# Patient Record
Sex: MALE | Race: WHITE | URBAN - METROPOLITAN AREA
[De-identification: names, ages, dates, MRNs, and addresses within clinical notes are randomized per-mention and may not be internally consistent; named-entity substitution may affect disease eponyms.]

---

## 2018-05-01 ENCOUNTER — HOSPITAL ENCOUNTER (INPATIENT)
Facility: CLINIC | Age: 83
LOS: 12 days | Discharge: NURSING, REHAB OR LONG-TERM CARE | DRG: 682 | End: 2018-05-15
Attending: EMERGENCY MEDICINE | Admitting: INTERNAL MEDICINE
Payer: MEDICARE

## 2018-05-01 ENCOUNTER — APPOINTMENT (OUTPATIENT)
Dept: GENERAL RADIOLOGY | Facility: CLINIC | Age: 83
DRG: 682 | End: 2018-05-01
Attending: EMERGENCY MEDICINE
Payer: MEDICARE

## 2018-05-01 ENCOUNTER — APPOINTMENT (OUTPATIENT)
Dept: CT IMAGING | Facility: CLINIC | Age: 83
DRG: 682 | End: 2018-05-01
Attending: EMERGENCY MEDICINE
Payer: MEDICARE

## 2018-05-01 DIAGNOSIS — M62.81 GENERALIZED MUSCLE WEAKNESS: ICD-10-CM

## 2018-05-01 DIAGNOSIS — M25.50 MULTIPLE JOINT PAIN: ICD-10-CM

## 2018-05-01 DIAGNOSIS — R41.82 ALTERED MENTAL STATUS, UNSPECIFIED ALTERED MENTAL STATUS TYPE: ICD-10-CM

## 2018-05-01 DIAGNOSIS — R33.8 BENIGN PROSTATIC HYPERPLASIA WITH URINARY RETENTION: ICD-10-CM

## 2018-05-01 DIAGNOSIS — N17.9 ACUTE RENAL FAILURE, UNSPECIFIED ACUTE RENAL FAILURE TYPE (H): Primary | ICD-10-CM

## 2018-05-01 DIAGNOSIS — I10 BENIGN ESSENTIAL HYPERTENSION: ICD-10-CM

## 2018-05-01 DIAGNOSIS — E86.0 DEHYDRATION: ICD-10-CM
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
## 2018-05-01 DIAGNOSIS — F03.91 DEMENTIA WITH BEHAVIORAL DISTURBANCE, UNSPECIFIED DEMENTIA TYPE: ICD-10-CM

## 2018-05-01 DIAGNOSIS — K27.9 PUD (PEPTIC ULCER DISEASE): ICD-10-CM

## 2018-05-01 DIAGNOSIS — N40.1 BENIGN PROSTATIC HYPERPLASIA WITH URINARY RETENTION: ICD-10-CM

## 2018-05-01 LAB
ALBUMIN SERPL-MCNC: 2.6 G/DL (ref 3.4–5)
ALP SERPL-CCNC: 74 U/L (ref 40–150)
ALT SERPL W P-5'-P-CCNC: 11 U/L (ref 0–70)
ANION GAP SERPL CALCULATED.3IONS-SCNC: 9 MMOL/L (ref 3–14)
AST SERPL W P-5'-P-CCNC: 14 U/L (ref 0–45)
BASOPHILS # BLD AUTO: 0 10E9/L (ref 0–0.2)
BASOPHILS NFR BLD AUTO: 0.5 %
BILIRUB SERPL-MCNC: 0.3 MG/DL (ref 0.2–1.3)
BUN SERPL-MCNC: 48 MG/DL (ref 7–30)
CALCIUM SERPL-MCNC: 8 MG/DL (ref 8.5–10.1)
CHLORIDE SERPL-SCNC: 111 MMOL/L (ref 94–109)
CO2 SERPL-SCNC: 23 MMOL/L (ref 20–32)
CREAT SERPL-MCNC: 3.38 MG/DL (ref 0.66–1.25)
DIFFERENTIAL METHOD BLD: ABNORMAL
EOSINOPHIL # BLD AUTO: 0.2 10E9/L (ref 0–0.7)
EOSINOPHIL NFR BLD AUTO: 2.7 %
ERYTHROCYTE [DISTWIDTH] IN BLOOD BY AUTOMATED COUNT: 14.9 % (ref 10–15)
GFR SERPL CREATININE-BSD FRML MDRD: 17 ML/MIN/1.7M2
GLUCOSE SERPL-MCNC: 103 MG/DL (ref 70–99)
HCT VFR BLD AUTO: 30 % (ref 40–53)
HGB BLD-MCNC: 10.1 G/DL (ref 13.3–17.7)
IMM GRANULOCYTES # BLD: 0 10E9/L (ref 0–0.4)
IMM GRANULOCYTES NFR BLD: 0.2 %
INR PPP: 1.15 (ref 0.86–1.14)
LYMPHOCYTES # BLD AUTO: 1 10E9/L (ref 0.8–5.3)
LYMPHOCYTES NFR BLD AUTO: 11.8 %
MCH RBC QN AUTO: 29.6 PG (ref 26.5–33)
MCHC RBC AUTO-ENTMCNC: 33.7 G/DL (ref 31.5–36.5)
MCV RBC AUTO: 88 FL (ref 78–100)
MONOCYTES # BLD AUTO: 0.5 10E9/L (ref 0–1.3)
MONOCYTES NFR BLD AUTO: 5.5 %
NEUTROPHILS # BLD AUTO: 6.7 10E9/L (ref 1.6–8.3)
NEUTROPHILS NFR BLD AUTO: 79.3 %
NRBC # BLD AUTO: 0 10*3/UL
NRBC BLD AUTO-RTO: 0 /100
PLATELET # BLD AUTO: 138 10E9/L (ref 150–450)
POTASSIUM SERPL-SCNC: 4.5 MMOL/L (ref 3.4–5.3)
PROT SERPL-MCNC: 5.7 G/DL (ref 6.8–8.8)
RBC # BLD AUTO: 3.41 10E12/L (ref 4.4–5.9)
SODIUM SERPL-SCNC: 143 MMOL/L (ref 133–144)
TROPONIN I SERPL-MCNC: 0.02 UG/L (ref 0–0.04)
WBC # BLD AUTO: 8.5 10E9/L (ref 4–11)

## 2018-05-01 PROCEDURE — 80053 COMPREHEN METABOLIC PANEL: CPT | Performed by: EMERGENCY MEDICINE

## 2018-05-01 PROCEDURE — 99285 EMERGENCY DEPT VISIT HI MDM: CPT | Mod: 25

## 2018-05-01 PROCEDURE — 84484 ASSAY OF TROPONIN QUANT: CPT | Performed by: EMERGENCY MEDICINE

## 2018-05-01 PROCEDURE — 85610 PROTHROMBIN TIME: CPT | Performed by: EMERGENCY MEDICINE

## 2018-05-01 PROCEDURE — 99220 ZZC INITIAL OBSERVATION CARE,LEVL III: CPT | Performed by: INTERNAL MEDICINE

## 2018-05-01 PROCEDURE — 93005 ELECTROCARDIOGRAM TRACING: CPT

## 2018-05-01 PROCEDURE — 96361 HYDRATE IV INFUSION ADD-ON: CPT

## 2018-05-01 PROCEDURE — G0378 HOSPITAL OBSERVATION PER HR: HCPCS

## 2018-05-01 PROCEDURE — 70450 CT HEAD/BRAIN W/O DYE: CPT

## 2018-05-01 PROCEDURE — 96360 HYDRATION IV INFUSION INIT: CPT

## 2018-05-01 PROCEDURE — 25000132 ZZH RX MED GY IP 250 OP 250 PS 637: Mod: GY | Performed by: INTERNAL MEDICINE

## 2018-05-01 PROCEDURE — A9270 NON-COVERED ITEM OR SERVICE: HCPCS | Mod: GY | Performed by: INTERNAL MEDICINE

## 2018-05-01 PROCEDURE — 73080 X-RAY EXAM OF ELBOW: CPT | Mod: RT

## 2018-05-01 PROCEDURE — 80177 DRUG SCRN QUAN LEVETIRACETAM: CPT | Performed by: EMERGENCY MEDICINE

## 2018-05-01 PROCEDURE — 85025 COMPLETE CBC W/AUTO DIFF WBC: CPT | Performed by: EMERGENCY MEDICINE

## 2018-05-01 PROCEDURE — 25000128 H RX IP 250 OP 636: Performed by: EMERGENCY MEDICINE

## 2018-05-01 PROCEDURE — 25000128 H RX IP 250 OP 636: Performed by: INTERNAL MEDICINE

## 2018-05-01 RX ORDER — ACETAMINOPHEN 325 MG/1
650 TABLET ORAL EVERY 4 HOURS PRN
Status: DISCONTINUED | OUTPATIENT
Start: 2018-05-01 | End: 2018-05-15 | Stop reason: HOSPADM

## 2018-05-01 RX ORDER — ONDANSETRON 4 MG/1
4 TABLET, ORALLY DISINTEGRATING ORAL EVERY 6 HOURS PRN
Status: DISCONTINUED | OUTPATIENT
Start: 2018-05-01 | End: 2018-05-15 | Stop reason: HOSPADM

## 2018-05-01 RX ORDER — HYDRALAZINE HYDROCHLORIDE 20 MG/ML
10 INJECTION INTRAMUSCULAR; INTRAVENOUS EVERY 6 HOURS PRN
Status: DISCONTINUED | OUTPATIENT
Start: 2018-05-01 | End: 2018-05-10

## 2018-05-01 RX ORDER — AMLODIPINE BESYLATE 10 MG/1
10 TABLET ORAL DAILY
Status: DISCONTINUED | OUTPATIENT
Start: 2018-05-02 | End: 2018-05-03 | Stop reason: DRUGHIGH

## 2018-05-01 RX ORDER — METOPROLOL SUCCINATE 50 MG/1
50 TABLET, EXTENDED RELEASE ORAL DAILY
Status: DISCONTINUED | OUTPATIENT
Start: 2018-05-02 | End: 2018-05-02

## 2018-05-01 RX ORDER — DOXAZOSIN 4 MG/1
8 TABLET ORAL AT BEDTIME
Status: DISCONTINUED | OUTPATIENT
Start: 2018-05-01 | End: 2018-05-03

## 2018-05-01 RX ORDER — ACETAMINOPHEN 650 MG/1
650 SUPPOSITORY RECTAL EVERY 4 HOURS PRN
Status: DISCONTINUED | OUTPATIENT
Start: 2018-05-01 | End: 2018-05-15 | Stop reason: HOSPADM

## 2018-05-01 RX ORDER — PROCHLORPERAZINE MALEATE 5 MG
5 TABLET ORAL EVERY 6 HOURS PRN
Status: DISCONTINUED | OUTPATIENT
Start: 2018-05-01 | End: 2018-05-15 | Stop reason: HOSPADM

## 2018-05-01 RX ORDER — LEVETIRACETAM 250 MG/1
250 TABLET ORAL AT BEDTIME
Status: DISCONTINUED | OUTPATIENT
Start: 2018-05-01 | End: 2018-05-15 | Stop reason: HOSPADM

## 2018-05-01 RX ORDER — SODIUM CHLORIDE 9 MG/ML
INJECTION, SOLUTION INTRAVENOUS CONTINUOUS
Status: DISCONTINUED | OUTPATIENT
Start: 2018-05-01 | End: 2018-05-02

## 2018-05-01 RX ORDER — AMLODIPINE BESYLATE 5 MG/1
5 TABLET ORAL ONCE
Status: COMPLETED | OUTPATIENT
Start: 2018-05-01 | End: 2018-05-01

## 2018-05-01 RX ORDER — LABETALOL HYDROCHLORIDE 5 MG/ML
10 INJECTION, SOLUTION INTRAVENOUS EVERY 6 HOURS PRN
Status: DISCONTINUED | OUTPATIENT
Start: 2018-05-01 | End: 2018-05-04

## 2018-05-01 RX ORDER — ONDANSETRON 2 MG/ML
4 INJECTION INTRAMUSCULAR; INTRAVENOUS EVERY 6 HOURS PRN
Status: DISCONTINUED | OUTPATIENT
Start: 2018-05-01 | End: 2018-05-15 | Stop reason: HOSPADM

## 2018-05-01 RX ORDER — NALOXONE HYDROCHLORIDE 0.4 MG/ML
.1-.4 INJECTION, SOLUTION INTRAMUSCULAR; INTRAVENOUS; SUBCUTANEOUS
Status: DISCONTINUED | OUTPATIENT
Start: 2018-05-01 | End: 2018-05-15 | Stop reason: HOSPADM

## 2018-05-01 RX ORDER — POLYETHYLENE GLYCOL 3350 17 G/17G
17 POWDER, FOR SOLUTION ORAL DAILY PRN
Status: DISCONTINUED | OUTPATIENT
Start: 2018-05-01 | End: 2018-05-15 | Stop reason: HOSPADM

## 2018-05-01 RX ORDER — OLMESARTAN MEDOXOMIL AND HYDROCHLOROTHIAZIDE 20/12.5 20; 12.5 MG/1; MG/1
1 TABLET ORAL DAILY
Status: ON HOLD | COMMUNITY
End: 2018-05-11

## 2018-05-01 RX ORDER — PROCHLORPERAZINE 25 MG
12.5 SUPPOSITORY, RECTAL RECTAL EVERY 12 HOURS PRN
Status: DISCONTINUED | OUTPATIENT
Start: 2018-05-01 | End: 2018-05-15 | Stop reason: HOSPADM

## 2018-05-01 RX ORDER — MEMANTINE HYDROCHLORIDE 10 MG/1
10 TABLET ORAL 2 TIMES DAILY
Status: DISCONTINUED | OUTPATIENT
Start: 2018-05-01 | End: 2018-05-09

## 2018-05-01 RX ORDER — CLONIDINE HYDROCHLORIDE 0.1 MG/1
0.1 TABLET ORAL
Status: DISCONTINUED | OUTPATIENT
Start: 2018-05-01 | End: 2018-05-10

## 2018-05-01 RX ADMIN — AMLODIPINE BESYLATE 5 MG: 5 TABLET ORAL at 21:21

## 2018-05-01 RX ADMIN — MEMANTINE 10 MG: 10 TABLET ORAL at 21:23

## 2018-05-01 RX ADMIN — DOXAZOSIN 8 MG: 4 TABLET ORAL at 21:23

## 2018-05-01 RX ADMIN — SODIUM CHLORIDE 1000 ML: 9 INJECTION, SOLUTION INTRAVENOUS at 17:19

## 2018-05-01 RX ADMIN — LEVETIRACETAM 250 MG: 250 TABLET, FILM COATED ORAL at 21:23

## 2018-05-01 RX ADMIN — SODIUM CHLORIDE: 9 INJECTION, SOLUTION INTRAVENOUS at 20:13

## 2018-05-01 ASSESSMENT — ENCOUNTER SYMPTOMS
WOUND: 1
NAUSEA: 0
WEAKNESS: 1
CONFUSION: 1
HEADACHES: 0
DYSURIA: 0
NECK PAIN: 0

## 2018-05-01 NOTE — IP AVS SNAPSHOT
"[place] [address] ONCOLOGY: [phone]                                              INTERAGENCY TRANSFER FORM - PHYSICIAN ORDERS   2018                    Hospital Admission Date: 2018  SHY BELLA   : 1926  Sex: Male        Attending Provider: Kelvin Humphrey MD     Allergies:  No Active Allergies    Infection:  None   Service:  HOSPITALIST    Ht:  1.702 m (5' 7\")   Wt:  64.9 kg (143 lb 1.6 oz)   Admission Wt:  61.7 kg (136 lb)    BMI:  22.41 kg/m 2   BSA:  1.75 m 2            Patient PCP Information     Provider PCP Type    Kal Guadalupe MD General      ED Clinical Impression     Diagnosis Description Comment Added By Time Added    Acute renal failure, unspecified acute renal failure type (H) [N17.9] Acute renal failure, unspecified acute renal failure type (H) [N17.9]  Ty De Luna MD 2018  6:16 PM    Fall, initial encounter [W19.XXXA] Fall, initial encounter [W19.XXXA]  Ty De Luna MD 2018  6:16 PM    Dehydration [E86.0] Dehydration [E86.0]  Ty De Luna MD 2018  6:19 PM    Altered mental status, unspecified altered mental status type [R41.82] Altered mental status, unspecified altered mental status type [R41.82]  Ty De Luna MD 2018  6:19 PM    Generalized muscle weakness [M62.81] Generalized muscle weakness [M62.81]  Ty De Luna MD 2018  6:19 PM      Hospital Problems as of 5/15/2018              Priority Class Noted POA    Fall Medium  2018 Yes    Acute renal failure (ARF) (H) Medium  5/3/2018 Yes      Non-Hospital Problems as of 5/15/2018              Priority Class Noted    ACP (advance care planning) Medium  2018      Code Status History     Date Active Date Inactive Code Status Order ID Comments User Context    2018  5:45 PM  DNR/DNI [id number]  Kike Knight MD Outpatient    2018  8:07 PM 2018  5:45 PM DNR/DNI [id number]  Shakir Erickson MD Inpatient         Medication Review "      START taking        Dose / Directions Comments    acetaminophen 325 MG tablet   Commonly known as:  TYLENOL   Used for:  Multiple joint pain        Dose:  650 mg   Take 2 tablets (650 mg) by mouth every 4 hours as needed for mild pain or fever   Quantity:  100 tablet   Refills:  0        finasteride 5 MG tablet   Commonly known as:  PROSCAR   Used for:  Benign prostatic hyperplasia with urinary retention        Dose:  5 mg   Take 1 tablet (5 mg) by mouth daily   Quantity:  30 tablet   Refills:  0        hydrALAZINE 50 MG tablet   Commonly known as:  APRESOLINE   Used for:  Benign essential hypertension        Dose:  50 mg   Take 1 tablet (50 mg) by mouth 3 times daily   Quantity:  60 tablet   Refills:  0        pantoprazole 40 MG EC tablet   Commonly known as:  PROTONIX   Used for:  PUD (peptic ulcer disease)        Dose:  40 mg   Take 1 tablet (40 mg) by mouth every morning   Quantity:  30 tablet   Refills:  0        * QUEtiapine 25 MG tablet   Commonly known as:  SEROquel   Used for:  Dementia with behavioral disturbance, unspecified dementia type        Dose:  25 mg   Take 1 tablet (25 mg) by mouth At Bedtime   Quantity:  60 tablet   Refills:  0        * QUEtiapine 25 MG tablet   Commonly known as:  SEROquel   Used for:  Dementia with behavioral disturbance, unspecified dementia type        Dose:  12.5 mg   Take 0.5 tablets (12.5 mg) by mouth 3 times daily as needed (restlessness/agitation)   Quantity:  60 tablet   Refills:  0        * Notice:  This list has 2 medication(s) that are the same as other medications prescribed for you. Read the directions carefully, and ask your doctor or other care provider to review them with you.      CONTINUE these medications which may have CHANGED, or have new prescriptions. If we are uncertain of the size of tablets/capsules you have at home, strength may be listed as something that might have changed.        Dose / Directions Comments    amLODIPine 10 MG tablet    Commonly known as:  NORVASC   This may have changed:    - medication strength  - how much to take   Used for:  Benign essential hypertension        Dose:  10 mg   Take 1 tablet (10 mg) by mouth daily   Quantity:  30 tablet   Refills:  2          CONTINUE these medications which have NOT CHANGED        Dose / Directions Comments    ASPIRIN PO        Dose:  81 mg   Take 81 mg by mouth daily   Refills:  0        calcium citrate-vitamin D 315-250 MG-UNIT Tabs per tablet   Commonly known as:  CITRACAL        Dose:  1 tablet   Take 1 tablet by mouth 2 times daily   Refills:  0        CYANOCOBALAMIN PO        Dose:  2500 mcg   Take 2,500 mcg by mouth daily   Refills:  0        DONEPEZIL HCL PO        Dose:  10 mg   Take 10 mg by mouth At Bedtime   Refills:  0        FOLIC ACID PO        Dose:  400 mcg   Take 400 mcg by mouth daily   Refills:  0        KEPPRA PO        Dose:  250 mg   Take 250 mg by mouth At Bedtime   Refills:  0        LEVOTHYROXINE SODIUM PO        Dose:  125 mcg   Take 125 mcg by mouth daily   Refills:  0        MEMANTINE HCL PO        Dose:  10 mg   Take 10 mg by mouth 2 times daily   Refills:  0        VITAMIN D (CHOLECALCIFEROL) PO        Dose:  1000 Units   Take 1,000 Units by mouth daily   Refills:  0          STOP taking     Biotin 5000 MCG Caps           CITALOPRAM HYDROBROMIDE PO           DOXAZOSIN MESYLATE PO           fish oil-omega-3 fatty acids 1000 MG capsule           Garlic 1000 MG Caps           GINKGO BILOBA [employer] ENHANCER PO           Glucosamine-Chondroitin 750-600 MG Chew           LISINOPRIL PO           METOPROLOL SUCCINATE ER PO           MULTIVITAMIN & MINERAL PO           olmesartan-hydrochlorothiazide 20-12.5 MG per tablet   Commonly known as:  BENICAR           PRAVASTATIN SODIUM PO           Turmeric 450 MG Caps           VITAMIN C PO                   Summary of Visit     Reason for your hospital stay       You were admitted for fall.             After Care     Activity -  Up with nursing assistance           Advance Diet as Tolerated       Follow this diet upon discharge:      Combination Diet Low Saturated Fat Na <2400mg Diet       Fall precautions           General info for SNF       Length of Stay Estimate: Short Term Care: Estimated # of Days <30  Condition at Discharge: Improving  Level of care:skilled   Rehabilitation Potential: Fair  Admission H&P remains valid and up-to-date: Yes  Recent Chemotherapy: N/A  Use Nursing Home Standing Orders: N/A       Mantoux instructions       Give two-step Mantoux (PPD) Per Facility Policy Yes             Referrals     Occupational Therapy Adult Consult       Evaluate and treat as clinically indicated.    Reason:  deconditioning       Physical Therapy Adult Consult       Evaluate and treat as clinically indicated.    Reason:  deconditioning             Supplies     AntiEmbolism Stockings       Bilateral below knee length.On in the morning, off at night             Follow-Up Appointment Instructions     Future Labs/Procedures    Follow Up and recommended labs and tests     Comments:    Follow up with Nursing home physician.  Repeat BMP later this week for cr stability.  Assess testicular lesion and if needed Dermatology referral  Follow up with Dermatology if NH physician deems appropriate  Follow up with Sherie Farrell NP, [facility] (Nephrology) on June 21st at 9:30 AM  [facility] address:  Clinic ([facility] [place] - next to [facility])  [address] [place] [address] [address] [id number]    Follow up with Dr. Katharine MCLEOD on May 24th at 2:00PM.   office address:  [address] [place] [address] [address] ([address]ross from [facility])  [place] [address] [phone]    Follow up with Dr. Pizarro of Urology as needed      Follow-Up Appointment Instructions     Follow Up and recommended labs and tests       Follow up with Nursing home physician.  Repeat BMP later this week for cr stability.   Assess testicular lesion and if needed Dermatology referral  Follow up with Dermatology if NH physician deems appropriate  Follow up with Sherie Farrell NP, [facility] (Nephrology) on June 21st at 9:30 AM  [facility] address:  Clinic ([facility] [place] - next to [facility])  [address], [place], [place] [address]    Follow up with Dr. Katharine MCLEOD on May 24th at 2:00PM.   office address:  [address] [place] [address],   [address] (across from [facility]  [place] [address] [phone]    Follow up with Dr. Pizarro of Urology as needed             Statement of Approval     Ordered          05/15/18 3714  I have reviewed and agree with all the recommendations and orders detailed in this document.  EFFECTIVE NOW     Approved and electronically signed by:  Tutu Scott MD

## 2018-05-01 NOTE — ED NOTES
Bed: ED28  Expected date:   Expected time:   Means of arrival:   Comments:  Jyotsna 2 89M, poss head injury, skin tear

## 2018-05-01 NOTE — IP AVS SNAPSHOT
` [place] [address] ONCOLOGY: [phone]                 INTERAGENCY TRANSFER FORM - NOTES (H&P, Discharge Summary, Consults, Procedures, Therapies)   2018                    Hospital Admission Date: 2018  YURIY BELLA   : 1926  Sex: Male        Patient PCP Information     Provider PCP Type    Kal Guadalupe MD General         History & Physicals      H&P signed by Shakir Erickson MD at 2018  6:36 AM      Author:  Shakir Erickson MD Service:  Hospitalist Author Type:  Physician    Filed:  2018  6:36 AM Date of Service:  2018  6:56 PM Creation Time:  2018  8:14 PM    Status:  Addendum :  Shakir Erickson MD (Physician)         Admitted:     2018      PRIMARY CARE PROVIDER:  Dr. Kal Guadalupe.      CHIEF COMPLAINT:  Fall.      HISTORY OF PRESENT ILLNESS:  Mr. Yuriy Bella is a 89-year-old gentleman with history noted below, including hypertension, peripheral arterial disease with prior abdominal aortic aneurysm repair, hypothyroidism, seizure disorder, dyslipidemia, BPH and dementia, who presents with the above complaints.  Please note, the patient does have dementia and is not a very good historian.  Much of the history is obtained from speaking with the ER physicians and from speaking with the patient's wife.  The patient was noted to have suffered a fall earlier today.  The patient was on a driveway and he fell.  Apparently, he had tripped on the steps from the garage to the driveway.  He denied any loss of consciousness.  The patient had a right elbow injury and was felt to have altered, impaired mentation and therefore, the wife called paramedics and he was brought to [facility] for further evaluation.      The patient seen in the ER by Dr. De Luna and vital signs were recorded and noted with temperature of 98.5 degrees, heart rate 52, blood pressure 151/130, respiration 18, O2 saturations 95% on room air.  He had laboratory  evaluation which showed his creatinine is 3.38 and BUN of 48.  Of note, creatinine was around 2.0 in 11/2017.  X-rays of the right elbow and CT of the head without contrast were negative.  Overall, given his fall and acute kidney injury, request for admission was made.     No complaints of chest pain or shortness of breath.  No fevers, chills.  No nausea, vomiting, bloody stools or diarrhea.        PAST MEDICAL HISTORY:   1.  Hypertension.   2.  Peripheral arterial disease.  He has had prior abdominal aortic aneurysm stent endovascular repair about 4 year ago.  3.  Seizure disorder.  4.  Dyslipidemia.  5.  Hypothyroidism.   6.  Dementia, noted variously as vascular dementia or Alzheimer dementia.   7.  Depression/anxiety.     8.  BPH.     9.  Allergic rhinitis.   10.  Diverticulosis.   11.  Mild aortic stenosis.   13.  Chronic kidney disease.  Unclear of baseline, but last creatinine was 2.00 in 11/2017.  14.  History of GI bleed due to duodenal ulcer.   15.  History of transient global amnesia.      PAST SURGICAL HISTORY:   1.  Status post tonsillectomy and adenoidectomy.   2.  Status post right inguinal hernia.     3.  Status post hemorrhoidectomy.   4.  Status post cholecystectomy.   5.  Status post knee arthroscopy.   6.  Status post total knee arthroplasty.   7.  Status post prostate biopsy.   8.  Status post abdominal aortic aneurysm endograft repair about 4 years ago.      From my review of the electronic and Care Everywhere, it was unclear what his recent baseline is, but the last creatinine I can find was from 11/2017 and it was 2.00, prior to that it was 1.30 in 12/2016.      ALLERGIES:  NO KNOWN DRUG ALLERGIES.      HOME MEDICATIONS (Not yet reconciled and this is from Care Everywhere):   1.  Multivitamin.     2.  Fexofenadine 180 mg at bedtime.     3.  Vitamin B6 20 mg a day.   4.  Calcium with vitamin D twice daily.   5.  Vitamin C twice daily.   6.  Glucosamine/chondroitin twice daily.   7.  Dulcolax  oral as needed.   8.  Aspirin 81 mg a day.   9.  Tramadol 50 mg every 4 hours p.r.n.   10.  Pravastatin 20 mg at bedtime.   11.  Metoprolol XL 50 mg a day.   12.  Aricept 10 mg at bedtime.   13.  Keppra 250 mg at bedtime.     14.  Namenda 10 mg b.i.d.   15.  Levothyroxine 125 mg a day.   16.  Lisinopril 20 mg a day.     17.  Olmesartan[GC1.1]-[GC1.2]hydrochlorothiazide 20[GC1.1]-[GC1.2]12.5 mg each day.   18.  Cardura 8 mg at bedtime.    19.  Amlodipine 5 mg a day.     20.  Celexa 20 mg a day.      SOCIAL HISTORY:  The patient is .  He has 1 son.  He does not smoke or drink.  He is retired and used to work in sales.  Presently, he lives with his wife at their home.      FAMILY HISTORY:  Reviewed and noncontributory.        REVIEW OF SYSTEMS:  As per HPI, otherwise 10-point review of systems negative.        PHYSICAL EXAMINATION:   VITAL SIGNS:  Temperature 98.5, heart rate 57, blood pressure 174/95, respiration 18, O2 saturation 96% on room air.   GENERAL:  Yuriy Ghosh is a 89-year-old male patient who is lying in bed.  He is conversant and friendly.   HEENT:  Pupils equal, round and reactive.  No scleral icterus or conjunctival injection.  Oropharynx is without gross erythema or exudate.   NECK:  No bruits, JVD or adenopathy.   HEART:  Slightly bradycardic, but regular with +1/6 systolic murmur.  No gallops or rubs.   LUNGS:  Diminished at bases, no crackles or wheezes.     CHEST:  Shows a barrel chest.   ABDOMEN:  Soft, nontender, nondistended with positive bowel sounds, no femoral bruits.   EXTREMITIES:  No significant edema, without knee joint swelling or skin rash.  On right elbow, there is a slight tear.      LABORATORY DATA:  Sodium 143, potassium 4.5, chloride 111, bicarb 23, BUN 48, creatinine 3.38, calcium 8.0.  LFTs showed albumin of 2.6, total protein 5.7 which were both low, otherwise normal.  Troponin is 0.020.  CBC showed a white count of 8.5, hemoglobin 12.1, platelets 138.  INR is 1.5.         IMAGING:  As above.        ASSESSMENT AND PLAN:  Mr. Yuriy Ghosh is a 89-year-old gentleman with history including hypertension, peripheral arterial disease with prior abdominal aortic aneurysm endograft repair, seizure disorder, dyslipidemia, hypothyroidism, BPH and dementia, who presents with a fall and found with acute kidney injury on chronic kidney disease.    1.  Acute kidney injury on chronic kidney disease, suspect prerenal component.  Unclear of baseline, but last creatinine was 2.00 in 11/2017.  We will need to finalize the medication list, but it appears he may be on both lisinopril and olmesartan/hydrochlorothiazide which could contribute.    At this time, we will hydrate with normal saline 100 mL an hour x 10 hours.  Hold lisinopril and olmesartan[GC1.1]-[GC1.2]hydrochlorothiazide.  Monitor BMP.  Avoid nephrotoxic medications.     2.  Fall, suspect multifactorial including related to problem #1.  Also suspect chronic gait instability.  The patient's wife does say that this is not the 1st time he has fallen.  Strongly advised that he consider the use of a cane or walker.  In addition to treating the above issues, we will ask physical therapy to see the patient.  Recommend he be discharged with a walker.    3.  Sinus bradycardia.  The patient has heart rate in the 50s.  I do see that he is on a number of medications which could cause bradycardia.  This includes metoprolol XL, as well as Aricept.  At this time, we will order hold parameters on metoprolol XL, as well as Aricept.  Monitor heart rate on telemetry.  Overall, he does not appear to be symptomatic, but if he is dropping to concerning levels, such as into the 40s, or if the patient is symptomatic, then may need to consider decreasing or stopping some of these medications.  At this time, I do not suspect this contributed to his fall, but again we will be monitoring him on telemetry.       4.  Essential hypertension.  We will need to  finalize his home medication list.  We will be holding ACE inhibitor/ARB and diuretics for now.  Continue metoprolol XL, increase amlodipine from 5 to 10 mg a day.  We will order p.r.n. clonidine, p.r.n. IV hydralazine and p.r.n. IV labetalol.    5.  Peripheral arterial disease.  History of abdominal aortic aneurysm endograft repair about 4 years ago.  Continue aspirin.    6.  Hypothyroidism.  Continue levothyroxine.    7.  Seizure disorder.  Continue Keppra.     8.  Dyslipidemia.  Continue pravastatin.       9.  BPH.  Continue Cardura.       10.  Prophylaxis.  Pneumo boots and ambulation.      CODE STATUS:  Discussed with patient and wife and he is DNR/DNI.         JAIMEE THAKKAR JR., MD             D: 2018   T: 2018   MT: RIANNA      Name:     SHY BELLA   MRN:      [id number]        Account:      [id number]   :      1926        Admitted:     2018                   Document: [id number][GC1.1]         Revision History        User Key Date/Time User Provider Type Action    > GC1.2 2018  6:36 AM Jaimee Thakkar MD Physician Addend     GC1.1 2018  8:44 PM Jaimee Thakkar MD Physician Sign     [N/A] 2018  8:14 PM Jaimee Thakkar MD Physician Edit            H&P by Jaimee Thakkar MD at 2018  6:56 PM     Author:  Jaimee Thakkar MD Service:  Internal Medicine Author Type:  Physician    Filed:  2018  6:57 PM Date of Service:  2018  6:56 PM Creation Time:  2018  6:56 PM    Status:  Signed :  Jaimee Thakkar MD (Physician)         INTERNAL MEDICINE H&P    H&P dictated:  #[id number]    Jaimee Thakkar Jr., MD  [phone] (p)[GC1.1]         Revision History        User Key Date/Time User Provider Type Action    > GC1.1 2018  6:57 PM Jaimee Thakkar MD Physician Sign                     Discharge Summaries      Discharge Summaries by Tutu Scott MD at 5/15/2018 11:05 AM     Author:  Tutu Scott MD Service:   Hospitalist Author Type:  Physician    Filed:  5/15/2018 11:40 AM Date of Service:  5/15/2018 11:05 AM Creation Time:  5/15/2018 11:04 AM    Status:  Addendum :  Tutu Scott MD (Physician)         [facility]    Discharge Summary  Hospitalist    Date of Admission:  5/1/2018  Date of Discharge:[JK1.1]  5/15/2018[JK1.2]  Discharging Provider:[JK1.1] Tutu Scott    Discharge Diagnoses[JK1.3]      Acute renal failure, unspecified acute renal failure type (H)  Fall, initial encounter  Dehydration  Altered mental status, unspecified altered mental status type  Generalized muscle weakness  Multiple joint pain  Benign essential hypertension  Dementia with behavioral disturbance, unspecified dementia type  Benign prostatic hyperplasia with urinary retention  PUD (peptic ulcer disease)  PAD  Sinus bradycardia  Seizure disorder  Anemia  Thrombocytopenia[JK1.1]    History of Present Illness[JK1.3]   Yuriy Ghosh is an 89 year old male who presented with fall, found to have SARAHI on CKD.[JK1.1]    Hospital Course[JK1.3]     Yuriy Ghosh was admitted on 5/1/2018.  The following problems were addressed during his hospitalization:     Bleeding duodenal ulcer  Acute on chronic blood loss anemia   Near syncope  On the morning of 5/12 the patient had a bowel movement with maroon colored stools and then had a near syncopal episode.  A RRT was called and the patient appeared pale and a CBC was obtained.  His hemoglobin had dropped significantly from admission and was down to 5.7.  Baseline appears to be 9-10 which was where he was on presentation.  Patient received 1 unit of PRBCs on 5/12.  GI was consulted and Dr. Alcantar took the patient for an EGD on 5/12 as well and found a bleeding duodenal ulcer.  On rechecks patient has required an additional 3 units of blood for a total of 4 units.  Hb has stabilized.  GI okay with DC.  Continue on Protonix 40 mg PO daily. Follow up[JK1.1] with   Loc in 2 weeks time[JK1.4].    SARAHI on CKD  Previous Baseline 1.4? (stable in this range 2014 and 2016), [facility].  Admission UA with > 182 RBC (possibly from traumatic cath in the ED) and >300 protein (also note UA 11/2017 with >300 protein and moderate blood).  CT stone negative for hydronephrosis, but noted very large prostate.  Renal US negative.  No significant improvement in function following ware placement.  Suspect CKD due to uncontrolled HTN and vascular disease.  Had only been taking Norvasc for BP PTA (non-compliant with ACE/ARB, diuretic and BB as had been prescribed by PCP). Denies NSAIDs.  Nephrology consulted, recommend follow up in clinic in 2-4 weeks.  Cr stable at time of discharge.      BPH   Ware placed 5/3, Urology removed 5/13/2018, no specific f/u necessarily inidicated unless px having trouble with urination. Was going to follow up with Dr. Pizarro 5/15/18.  PTA Cardura stopped due to his falls and he was placed on Finasteride 5 mg daily this stay which we will continue.      Essential hypertension:   Norvasc increased to 10 mg daily and initiated on hydralazine which was titrated to 100 mg qid initially but then down to 50 bg TID which we will DC on.  No ACE/ARB or diuretic due to CKD, no BB due to bradycardia.       Peripheral arterial disease:  History of AAA endograft repair about 4 years ago.  Holding aspirin due to PUD, may restart in future after further review.       Sinus Bradycardia  EKG on admission with HR 40-50s with Mobitz, 1.  Note drop to 40's on 5/2 after beta-blocker was re-introduced, now discontinued.  Echo with EF 45-50%, mild global hypokinesia of LV, dilated LA, 1-2+ MR and TR, Pulm HTN, mod AV stenosis (no prior for comparison). Patient does not appear to be symptomatic but difficult determine with advanced dementia.  Rates stable without further beta-blockers.      Mechanical Fall    No seizure activity reported. Witnessed by his wife and was  reported that he tripped over the steps outside of his home and no LOC reported. Unable to determine from patient if dizzy prior to fall or any other sx's given dementia. No events other than bradycardia on tele.  TTE as above.      Hypothyroidism:  Continue levothyroxine 125 mcg daily      Seizure disorder:   Keppra level <2 on admit.  Likely due to non-compliance as was placed on PTA dose with therapeutic level noted 5/10.  Continue Keppra 250 mg QHS      Dementia:  Continue PTA memantine 10 mg BID, donepezil 10 mg QHS.  Placed on Seroquel every evening and prn with noted prolongation of QTc so PTA Celexa discontinued at discharge.      # Discharge Pain Plan:   - Patient currently has NO PAIN and is not being prescribed pain medications on discharge.   [JK1.1]  Significant Results and Procedures[JK1.3]   none[JK1.1]    Pending Results[JK1.3]   These results will be followed up by none[JK1.1]  Unresulted Labs Ordered in the Past 30 Days of this Admission     No orders found from 3/2/2018 to 5/2/2018.          Code Status[JK1.3]   DNR / DNI[JK1.1]       Primary Care Physician   Kal Guadalupe    Physical Exam   Temp: 97  F (36.1  C) Temp src: Oral BP: 169/72   Heart Rate: 55 Resp: 16 SpO2: 94 % O2 Device: None (Room air)    Vitals:    05/01/18 1928 05/03/18 0157 05/12/18 0230   Weight: 61.7 kg (136 lb) 65.8 kg (145 lb) 64.9 kg (143 lb 1.6 oz)[JK1.3]     Vital Signs with Ranges[JK1.1]  Temp:  [96.7  F (35.9  C)-97.9  F (36.6  C)] 97  F (36.1  C)  Heart Rate:  [52-71] 55  Resp:  [14-16] 16  BP: (138-178)/(52-88) 169/72  SpO2:  [94 %-95 %] 94 %  I/O last 3 completed shifts:  In: 340 [P.O.:340]  Out: -[JK1.3]     Constitutional: NAD,[JK1.1] Alert and oriented, pleasantly confused at times, is[JK1.5] Conversational  HEENT:  PERRL, Atraumatic, Neck is supple  Respiratory: CTAB, No Wheeze, Rhonchi, or Crackles appreciated.   Cardiovascular: RRR, No m/r/r  GI: Soft, Non-tender, Non-distended.  Ext: No lower extremity  edema  Skin/Integumen: Intact, No erythema  Neuro: No new focal deficits appre[JK1.1]c[JK1.5]iated  Psych:  Appropriate Affect   Other:[JK1.1]      Discharge Disposition[JK1.3]   Discharged to nursing home  Condition at discharge: Stable[JK1.1]    Consultations This Hospital Stay   SOCIAL WORK IP CONSULT  PHYSICAL THERAPY ADULT IP CONSULT  UROLOGY IP CONSULT  NEPHROLOGY IP CONSULT  OCCUPATIONAL THERAPY ADULT IP CONSULT  PHYSICAL THERAPY ADULT IP CONSULT  GASTROENTEROLOGY IP CONSULT    Time Spent on this Encounter[JK1.3]   I, Tutu Scott, personally saw the patient today and spent greater than 30 minutes discharging this patient.[JK1.1]    Discharge Orders[JK1.3]     AntiEmbolism Stockings   Bilateral below knee length.On in the morning, off at night     General info for SNF   Length of Stay Estimate: Short Term Care: Estimated # of Days <30  Condition at Discharge: Improving  Level of care:skilled   Rehabilitation Potential: Fair  Admission H&P remains valid and up-to-date: Yes  Recent Chemotherapy: N/A  Use Nursing Home Standing Orders: N/A     Mantoux instructions   Give two-step Mantoux (PPD) Per Facility Policy Yes     Reason for your hospital stay   You were admitted for fall.     Activity - Up with nursing assistance     Follow Up and recommended labs and tests   Follow up with Nursing home physician.  Repeat BNP for cr stability.  Assess testicular lesion and if needed Dermatology referral  Follow up with Sherie Farrell NP, [facility] (Nephrology), [phone], in 2-4 weeks.   Follow up with Dr. Pizarro of Urology was scheduled on 5/15, but since ware out, may follow up prn.  Follow up with Dermatology if NH physician deems appropriate     DNR/DNI     Occupational Therapy Adult Consult   Evaluate and treat as clinically indicated.    Reason:  deconditioning     Physical Therapy Adult Consult   Evaluate and treat as clinically indicated.    Reason:  deconditioning     Fall precautions      Advance Diet as Tolerated   Follow this diet upon discharge:     Combination Diet Low Saturated Fat Na <2400mg Diet[JK1.5]       Discharge Medications   Current Discharge Medication List      START taking these medications    Details   acetaminophen (TYLENOL) 325 MG tablet Take 2 tablets (650 mg) by mouth every 4 hours as needed for mild pain or fever  Qty: 100 tablet    Associated Diagnoses: Multiple joint pain      finasteride (PROSCAR) 5 MG tablet Take 1 tablet (5 mg) by mouth daily  Qty: 30 tablet    Associated Diagnoses: Benign prostatic hyperplasia with urinary retention      hydrALAZINE (APRESOLINE) 50 MG tablet Take 1 tablet (50 mg) by mouth 3 times daily  Qty: 60 tablet    Associated Diagnoses: Benign essential hypertension      pantoprazole (PROTONIX) 40 MG EC tablet Take 1 tablet (40 mg) by mouth every morning  Qty: 30 tablet    Associated Diagnoses: PUD (peptic ulcer disease)      !! QUEtiapine (SEROQUEL) 25 MG tablet Take 1 tablet (25 mg) by mouth At Bedtime  Qty: 60 tablet    Associated Diagnoses: Dementia with behavioral disturbance, unspecified dementia type      !! QUEtiapine (SEROQUEL) 25 MG tablet Take 0.5 tablets (12.5 mg) by mouth 3 times daily as needed (restlessness/agitation)  Qty: 60 tablet    Associated Diagnoses: Dementia with behavioral disturbance, unspecified dementia type       !! - Potential duplicate medications found. Please discuss with provider.      CONTINUE these medications which have CHANGED    Details   amLODIPine (NORVASC) 10 MG tablet Take 1 tablet (10 mg) by mouth daily  Qty: 30 tablet, Refills: 2    Associated Diagnoses: Benign essential hypertension         CONTINUE these medications which have NOT CHANGED    Details   ASPIRIN PO Take 81 mg by mouth daily      calcium citrate-vitamin D (CITRACAL) 315-250 MG-UNIT TABS per tablet Take 1 tablet by mouth 2 times daily      CYANOCOBALAMIN PO Take 2,500 mcg by mouth daily      DONEPEZIL HCL PO Take 10 mg by mouth At  Bedtime      FOLIC ACID PO Take 400 mcg by mouth daily      LevETIRAcetam (KEPPRA PO) Take 250 mg by mouth At Bedtime      LEVOTHYROXINE SODIUM PO Take 125 mcg by mouth daily      MEMANTINE HCL PO Take 10 mg by mouth 2 times daily      VITAMIN D, CHOLECALCIFEROL, PO Take 1,000 Units by mouth daily         STOP taking these medications       Ascorbic Acid (VITAMIN C PO) Comments:   Reason for Stopping:         Biotin 5000 MCG CAPS Comments:   Reason for Stopping:         CITALOPRAM HYDROBROMIDE PO Comments:   Reason for Stopping:         DOXAZOSIN MESYLATE PO Comments:   Reason for Stopping:         fish oil-omega-3 fatty acids 1000 MG capsule Comments:   Reason for Stopping:         Garlic 1000 MG CAPS Comments:   Reason for Stopping:         GINKGO BILOBA MEMORY ENHANCER PO Comments:   Reason for Stopping:         Glucosamine-Chondroitin 750-600 MG CHEW Comments:   Reason for Stopping:         LISINOPRIL PO Comments:   Reason for Stopping:         METOPROLOL SUCCINATE ER PO Comments:   Reason for Stopping:         Multiple Vitamins-Minerals (MULTIVITAMIN & MINERAL PO) Comments:   Reason for Stopping:         olmesartan-hydrochlorothiazide (BENICAR) 20-12.5 MG per tablet Comments:   Reason for Stopping:         PRAVASTATIN SODIUM PO Comments:   Reason for Stopping:         Turmeric 450 MG CAPS Comments:   Reason for Stopping:             Allergies   No Active Allergies  Data[JK1.3]   Most Recent 3 CBC's:[JK1.1]  Recent Labs   Lab Test  05/15/18   0725  05/14/18   0700  05/13/18   2355   05/13/18   0709   05/12/18   0625   05/02/18   0627   WBC   --    --    --    --   12.0*   --   11.5*   --   7.0   HGB  7.8*  7.5*  7.5*   < >  6.9*   < >  5.7*  Canceled, Test credited   < >  9.0*   MCV   --    --    --    --   87   --   87   --   88   PLT   --    --    --    --   116*   --   151   --   109*    < > = values in this interval not displayed.[JK1.3]      Most Recent 3 BMP's:[JK1.1]  Recent Labs   Lab Test  05/15/18    0725  05/14/18   0700  05/13/18   0709   NA  141  142  142   POTASSIUM  4.1  4.2  4.7   CHLORIDE  112*  114*  113*   CO2  21  20  21   BUN  68*  74*  85*   CR  3.98*  3.90*  4.22*   ANIONGAP  8  8  8   TEMO  7.3*  7.0*  7.0*   GLC  89  88  104*[JK1.3]     Most Recent 2 LFT's:[JK1.1]  Recent Labs   Lab Test  05/01/18   1715   AST  14   ALT  11   ALKPHOS  74   BILITOTAL  0.3[JK1.3]     Most Recent INR's and Anticoagulation Dosing History:  Anticoagulation Dose History     Recent Dosing and Labs Latest Ref Rng & Units 5/1/2018 5/12/2018    INR 0.86 - 1.14 1.15(H) 1.26(H)        Most Recent 3 Troponin's:[JK1.1]  Recent Labs   Lab Test  05/01/18   1715   TROPI  0.020[JK1.3]     Most Recent Cholesterol Panel:[JK1.1]No lab results found.[JK1.3]  Most Recent 6 Bacteria Isolates From Any Culture (See EPIC Reports for Culture Details):[JK1.1]  Recent Labs   Lab Test  05/13/18   1625   CULT  10,000 to 50,000 colonies/mL  mixed urogenital michael  Susceptibility testing not routinely done[JK1.3]       Most Recent TSH, T4 and A1c Labs:[JK1.1]No lab results found.[JK1.3]  Results for orders placed or performed during the hospital encounter of 05/01/18   CT Head w/o Contrast    Narrative    CT SCAN OF THE HEAD WITHOUT CONTRAST   5/1/2018 6:06 PM     HISTORY: Fall, head injury.    TECHNIQUE: Axial images of the head and coronal reformations without  IV contrast material. Radiation dose for this scan was reduced using  automated exposure control, adjustment of the mA and/or kV according  to patient size, or iterative reconstruction technique.    COMPARISON: None.    FINDINGS: There is no evidence of intracranial hemorrhage, mass, acute  infarct or anomaly. There is generalized atrophy of the brain. There  is low attenuation in the white matter of the cerebral hemispheres  consistent with sequelae of small vessel ischemic disease.     The visualized portions of the sinuses and mastoids appear normal.  Right vertex scalp soft tissue  injury without underlying fracture.      Impression    IMPRESSION:     1. No evidence of acute intracranial hemorrhage, mass, or herniation.  2. There is generalized atrophy of the brain. White matter changes are  present in the cerebral hemispheres that are consistent with small  vessel ischemic disease in this age patient.   3. Right vertex scalp soft tissue injury without underlying fracture.      AJITH FANG MD   XR Elbow Right G/E 3 Views    Narrative    RIGHT ELBOW THREE OR MORE VIEWS  5/1/2018 5:37 PM      HISTORY: Fall, trauma.    COMPARISON: None.      Impression    IMPRESSION: No acute fracture or dislocation.    LAN VILLARREAL MD   CT Abdomen Pelvis w/o Contrast    Narrative    CT ABDOMEN AND PELVIS WITHOUT CONTRAST 5/2/2018 8:25 AM     HISTORY: SARAHI, hematuria. Evaluate for stone and hydronephrosis.     COMPARISON: None.    TECHNIQUE: Axial CT images of the abdomen and pelvis from the  diaphragm to the symphysis pubis were acquired without IV contrast.  Radiation dose for this scan was reduced using automated exposure  control, adjustment of the mA and/or kV according to patient size, or  iterative reconstruction technique.    FINDINGS: There are no stones seen within either kidney, either  ureter, or the bladder. There is no hydroureter or hydronephrosis.  There is no perinephric fat stranding. Kidneys are normal in size and  configuration. There is marked prostatomegaly with the prostate  measuring 7.4 x 5.9 x 6.3 cm. Multiple bilateral renal cysts. A few  small hyperdense lesions are seen compatible with proteinaceous or  hemorrhagic cysts in both kidneys. Saccular infrarenal abdominal  aortic aneurysm repair changes. No free air or free fluid. There are  no dilated loops of small intestine or large bowel to suggest ileus or  obstruction. Cholecystectomy changes. Liver unremarkable. No  splenomegaly. No definite adrenal nodules. Pancreas grossly  unremarkable. The remainder of the visualized abdomen  is unremarkable  on this noncontrast scan. Survey of the visualized bony structures  demonstrates no destructive bony lesions. Minimal bilateral effusions  and mild-moderate interlobular septal thickening/interstitial changes  at both lung bases.      Impression    IMPRESSION:   1. No hydronephrosis.  2. Marked prostatomegaly, bladder is not particularly distended above  this and there is no hydronephrosis to suggest a bladder outlet  obstruction, but this cannot be excluded.    JESS CAREY MD   US Renal Complete    Narrative    ULTRASOUND RETROPERITONEAL COMPLETE 5/4/2018 3:24 PM     HISTORY: 89-year-old male with acute kidney injury on chronic disease.  Evaluate for any acute pathology.    COMPARISON: CT 5/2/2018    FINDINGS: The bilateral renal parenchyma are abnormally increased in  echogenicity without evidence for shadowing stone or mass. There are  numerous bilateral renal cysts, measuring up to 4.5 cm on the right  and 4.7 cm on the left.. No hydronephrosis. Right kidney measures 11.9  x 5.5 x 6.0 cm and the left measures 11.4 x 5.3 x 6.1 cm. Cortical  thickness is 1.4 cm on the right and 1.9 cm on the left. Bladder is  decompressed by a Rodriguez catheter.      Impression    IMPRESSION: Increased echogenicity of the kidneys compatible with  medical renal disease. Numerous simple appearing cysts. No  hydronephrosis.    GEORGINA MORGAN MD[JK1.1]          Revision History        User Key Date/Time User Provider Type Action    > JK1.4 5/15/2018 11:40 AM Tutu Scott MD Physician Addend     JK1.5 5/15/2018 11:18 AM Tutu Scott MD Physician Sign     JK1.2 5/15/2018 11:15 AM Tutu Scott MD Physician      JK1.3 5/15/2018 11:05 AM Tutu Scott MD Physician      JK1.1 5/15/2018 11:04 AM Tutu Scott MD Physician                      Consult Notes      Consults by Alex Alcantar MD at 5/12/2018  9:16 AM     Author:  Alex Alcantar MD Service:  Gastroenterology  Author Type:  Physician    Filed:  5/12/2018 11:17 AM Date of Service:  5/12/2018  9:16 AM Creation Time:  5/12/2018  9:16 AM    Status:  Signed :  Alex Alcantar MD (Physician)     Consult Orders:    1. Gastroenterology IP Consult: new onset hematochezia/ maroon stool; Consultant may enter orders: Yes; Patient to be seen: ASAP - within 4 hours; Call back #: [phone]; Requested Clinic/Group: Dr. Alcantar; Requested Provider: Dr. Alcantar [[id number]] ordered by Diana Faith APRN CNP at 05/12/18 0749                [facility]  Gastroenterology Consultation         Yuriy Ghosh  [address] [place] [address]  89 year old male    Admission Date/Time: 5/1/2018  Primary Care Provider: Kal Guadalupe  Referring / Attending Physician:[AB1.1]  Dr. Crooks[AB1.2]    We were asked to see the patient in consultation by [AB1.1] Valeriy[AB1.2] for evaluation of[AB1.1] acute GI bleed[AB1.2].      CC:[AB1.1] Acute GI bleed and pre syncope[AB1.2]    HPI:  Yuriy Ghosh is a 89 year old male who[AB1.1] was admitted with h/O SARAHI and fall along with bradycardia. 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  Patient was being discharged today. Patient developed acute onset of dark maroon stools with hemoglobin drop from 9 to 5.7 with weakness and lightheadedness. Patient denied h/o chest pain, SOB. Patient was started on Protonix. Patient is getting I/V Protonix. Patient is getting transfused Patient is c/o abdominal pain.[AB1.2]   Patient is laying comfortably[AB1.3]    ROS: A comprehensive ten point review of systems was negative aside from those in mentioned in the HPI.      PAST MED HX:  I have reviewed this patient's medical history and updated it with  pertinent information if needed.   Past Medical History:   Diagnosis Date     Alzheimer disease        MEDICATIONS:   Prior to Admission Medications   Prescriptions Last Dose Informant Patient Reported? Taking?   ASPIRIN PO Past Week at Unknown time Spouse/Significant Other Yes Yes   Sig: Take 81 mg by mouth daily   Ascorbic Acid (VITAMIN C PO) Past Week at Unknown time Spouse/Significant Other Yes No   Sig: Take 1,000 mg by mouth daily With rosehips   Biotin 5000 MCG CAPS Past Week at Unknown time Spouse/Significant Other Yes No   Sig: Take 5,000 mcg by mouth daily   CITALOPRAM HYDROBROMIDE PO Past Week at Unknown time Spouse/Significant Other Yes No   Sig: Take 20 mg by mouth daily   CYANOCOBALAMIN PO Past Week at Unknown time Spouse/Significant Other Yes Yes   Sig: Take 2,500 mcg by mouth daily   DONEPEZIL HCL PO Past Week at Unknown time Spouse/Significant Other Yes Yes   Sig: Take 10 mg by mouth At Bedtime   DOXAZOSIN MESYLATE PO Past Week at Unknown time Spouse/Significant Other Yes No   Sig: Take 8 mg by mouth At Bedtime   FOLIC ACID PO Past Week at Unknown time Spouse/Significant Other Yes Yes   Sig: Take 400 mcg by mouth daily   GINKGO BILOBA MEMORY ENHANCER PO Past Week at Unknown time Spouse/Significant Other Yes No   Sig: Take 60 mg by mouth daily   Garlic 1000 MG CAPS Past Week at Unknown time Spouse/Significant Other Yes No   Sig: Take 1,000 mg by mouth daily   Glucosamine-Chondroitin 750-600 MG CHEW Past Week at Unknown time Spouse/Significant Other Yes No   Sig: Take 1 tablet by mouth 2 times daily   LEVOTHYROXINE SODIUM PO Past Week at Unknown time Spouse/Significant Other Yes Yes   Sig: Take 125 mcg by mouth daily   LISINOPRIL PO  Spouse/Significant Other Yes No   Sig: Take 20 mg by mouth daily   LevETIRAcetam (KEPPRA PO) Past Week at Unknown time Spouse/Significant Other Yes Yes   Sig: Take 250 mg by mouth At Bedtime   MEMANTINE HCL PO Past Week at Unknown time Spouse/Significant Other Yes Yes    Sig: Take 10 mg by mouth 2 times daily   METOPROLOL SUCCINATE ER PO  Spouse/Significant Other Yes No   Sig: Take 50 mg by mouth daily   Multiple Vitamins-Minerals (MULTIVITAMIN & MINERAL PO) Past Week at Unknown time Spouse/Significant Other Yes No   Sig: Take 1 tablet by mouth daily   PRAVASTATIN SODIUM PO  Spouse/Significant Other Yes No   Sig: Take 20 mg by mouth every evening   Turmeric 450 MG CAPS Past Week at Unknown time Spouse/Significant Other Yes No   Sig: Take 450 mg by mouth daily   VITAMIN D, CHOLECALCIFEROL, PO Past Week at Unknown time Spouse/Significant Other Yes Yes   Sig: Take 1,000 Units by mouth daily   calcium citrate-vitamin D (CITRACAL) 315-250 MG-UNIT TABS per tablet Past Week at Unknown time Spouse/Significant Other Yes Yes   Sig: Take 1 tablet by mouth 2 times daily   fish oil-omega-3 fatty acids 1000 MG capsule Past Week at Unknown time Spouse/Significant Other Yes No   Sig: Take 1 g by mouth daily   olmesartan-hydrochlorothiazide (BENICAR) 20-12.5 MG per tablet  Spouse/Significant Other Yes No   Sig: Take 1 tablet by mouth daily      Facility-Administered Medications: None       ALLERGIES: No Known Allergies    SOCIAL HISTORY:  Social History   Substance Use Topics     Smoking status: Not on file     Smokeless tobacco: Not on file     Alcohol use No       FAMILY HISTORY:  No family history on file.    PHYSICAL EXAM:   General[AB1.1]  Awake, comfortable alert.[AB1.3]  Vital Signs with Ranges  Temp: 96.3  F (35.7  C) Temp src: Oral BP: 127/51 Pulse: 58 Heart Rate: 57 Resp: 14 SpO2: 100 % O2 Device: None (Room air) Oxygen Delivery: 2 LPM  I/O last 3 completed shifts:  In: 990 [P.O.:990]  Out: 825 [Urine:825][AB1.1]    Constitutional: healthy, alert and no distress   Cardiovascular: negative, PMI normal. No lifts, heaves, or thrills. RRR. No murmurs, clicks gallops or rub  Respiratory: negative, Percussion normal. Good diaphragmatic excursion. Lungs clear  Head: Normocephalic. No masses,  lesions, tenderness or abnormalities  Neck: Neck supple. No adenopathy. Thyroid symmetric, normal size,, Carotids without bruits.  Abdomen: Abdomen soft, non-tender. BS normal. No masses, organomegaly  SKIN: no suspicious lesions or rashes          A[AB1.3]DDITIONAL COMMENTS:   I reviewed the patient's new clinical lab test results.   Recent Labs   Lab Test  05/12/18   0625  05/04/18   0705  05/02/18   0627  05/01/18   1715   WBC  11.5*   --   7.0  8.5   HGB  5.7*  Canceled, Test credited  9.0*  9.0*  10.1*   MCV  87   --   88  88   PLT  151   --   109*  138*   INR  1.26*   --    --   1.15*     Recent Labs   Lab Test  05/12/18   0625  05/11/18   0615  05/10/18   0659   POTASSIUM  4.5  3.9  4.3   CHLORIDE  113*  112*  112*   CO2  19*  23  24   BUN  76*  58*  50*   ANIONGAP  10  7  7     Recent Labs   Lab Test  05/10/18   1845  05/02/18   0130  05/01/18   1715   ALBUMIN   --    --   2.6*   BILITOTAL   --    --   0.3   ALT   --    --   11   AST   --    --   14   PROTEIN  300*  300*   --        I reviewed the patient's new imaging results.        CONSULTATION ASSESSMENT AND PLAN:    Active Problems:    Fall    Assessment[AB1.1]:  89 year old male with complicated past medical history with acute onset of abdominal discomfort and significant GI bleed. D/D would include acute upper GI bleed from DU. Other D/D would include diverticular bleed, ischemic colitis.  Will plan for Urgent EGD today. If negative than plan for colonoscopy.  Agree with current plan of I/V Protonix, serial Hbg, Transfusion of PRBc. Close monitoring.    Plan D/W patient and his girl friend.  Thank you letting me participate in his care.[AB1.3]            Alex Alcantar MD, FACP  [facility].  Office: [phone]  Cell : [phone][AB1.1]     Revision History        User Key Date/Time User Provider Type Action    > AB1.3 5/12/2018 11:17 AM Alex Alcantar MD Physician Sign     AB1.2 5/12/2018 10:26 AM Alex Alcantar  MD Natanael Physician      AB1.1 5/12/2018  9:16 AM Alex Alcantar MD Physician             Consults by Alex Alcantar MD at 5/12/2018  9:19 AM     Author:  Alex Alcantar MD Service:  Gastroenterology Author Type:  Physician    Filed:  5/12/2018  9:19 AM Date of Service:  5/12/2018  9:19 AM Creation Time:  5/12/2018  9:16 AM    Status:  Signed :  Alex Alcantar MD (Physician)         Acute GI bleed with pre syncope  Plan for urgent EGD this AM  D/W patient and Dr. Valeriy Alcantar MD[AB1.1]     Revision History        User Key Date/Time User Provider Type Action    > AB1.1 5/12/2018  9:19 AM Alex Alcantar MD Physician Sign            Consults by Kelvin Amaya MD at 5/10/2018  2:00 PM     Author:  Kelvin Amaya MD Service:  Nephrology Author Type:  Physician    Filed:  5/11/2018  2:38 PM Date of Service:  5/10/2018  2:00 PM Creation Time:  5/11/2018  2:17 PM    Status:  Signed :  Kelvin Amaya MD (Physician)         Nephrology Consultation      Yuriy Ghosh MRN# [id number]   YOB: 1926 Age: 89 year old   Date of Admission: 5/1/2018     Reason for consult: I was asked by Dr. Knight to evaulate this patient for CKD. .           Assessment and Plan:   This is an elderly man with dementia and confusion who appears to have a progressive course of chronic kidney disease.  This is associated with diffuse vascular disease and hypertension.  He also has prostate enlargement and currently has a Rodriguez catheter in place.  He is on blood pressure medications with reasonable control and is on finasteride for his prostate.    There is no prior history of diabetes or urologic interventions.    I believe he most likely has chronic kidney disease based on vascular disease and hypertension.  He may have chronic obstruction as well that is now relieved with his Rodriguez catheter in place.  I would simply continue his current  medications and he will need monitoring in the future once he leaves the hospital at our nephrology clinic.  We may want to get urology to see him to see if there is any intervention we need to do for the prostate.  We will follow him while he is in the hospital.             Chief Complaint:   Admitted with a fall. On 5/1/18.     History is obtained from the patient and electronic health record, pt is confused and has dementia.          History of Present Illness:   This patient is a 89 year old male who presents with a fall. He has a hx of hypertension and diffuse vascular disease including aortic aneurysm, S/P EVAR. He also has a hx of BPH. He has CKD but his baseline Cr in uncertain. On admission his creatinine was 3.38 and it improved over the next several days to 2.82.  However his creatinine has now been rising again and is now 3.59. His electrolytes are unremarkable with a potassium of 3.9 and a bicarbonate of 23.  His calcium is low at 7.4 and his magnesium and phosphorus are normal.  His uric acid is normal. Urinalysis done yesterday showed 300 of protein moderate blood 21 white cells and 94 red cells.  This is from a Rodriguez catheter. The spot urine protein excretion was 7.68 g/g.  Imaging studies included a renal ultrasound which showed bilateral increased echogenicity without stone or mass.  There were numerous bilateral renal cysts but no hydronephrosis.  The right kidney was 11.9 cm and the left 11.4 cm.  The bladder was decompressed with a Rodriguez catheter. A CT scan of the abdomen without contrast showed no kidney stones.  There was no hydronephrosis.  The kidneys were normal in size and configuration.  There was a markedly enlarged prostate.  There are multiple bilateral renal cysts.    His creatinine on 11/17 was 2.0 with an estimated GFR of 38.  Reviewing past labs show that since 2009 his creatinine has been progressively getting worse.    His urine output for the last 3 days has been 800-1100  mL/day.    He was initially treated with IV fluids including saline and lactated Ringer's. I do not see any nephrotoxic exposure.        The patient is currently on amlodipine and hydralazine for his hypertension.  Currently his blood pressure shows a well-controlled diastolic pressure ranging from 65-77 and a mildly elevated systolic pressure ranging from 146-162.  His heart rate is in the 70 range.              Past Medical History:[JT1.1]     Past Medical History:   Diagnosis Date     Alzheimer disease[JT1.2]              Past Surgical History:[JT1.1]   History reviewed. No pertinent surgical history.[JT1.2]            Social History:[JT1.1]     Social History   Substance Use Topics     Smoking status: Not on file     Smokeless tobacco: Not on file     Alcohol use No[JT1.2]      Pt is  and lives with his wife. He does not smoke or drink. He is retired.        Family History:[JT1.1]   No family history on file.[JT1.2]          Allergies:[JT1.1]   No Known Allergies[JT1.2]          Medications:[JT1.1]       amLODIPine (NORVASC) tablet 10 mg  10 mg Oral Daily     citalopram (celeXA) tablet 20 mg  20 mg Oral Daily     donepezil (ARICEPT) tablet 10 mg  10 mg Oral At Bedtime     finasteride  5 mg Oral Daily     hydrALAZINE  100 mg Oral 4x Daily     levETIRAcetam  250 mg Oral At Bedtime     levothyroxine (SYNTHROID/LEVOTHROID) tablet 125 mcg  125 mcg Oral QAM AC     memantine  10 mg Oral BID     QUEtiapine  25 mg Oral At Bedtime     acetaminophen, acetaminophen, melatonin, naloxone, ondansetron **OR** ondansetron, opium-belladonna, polyethylene glycol, prochlorperazine **OR** prochlorperazine **OR** prochlorperazine, QUEtiapine[JT1.3]          Review of Systems:   Pt confused.             Physical Exam:     Vitals were reviewed[JT1.1]  Patient Vitals for the past 8 hrs:   BP Temp Temp src Heart Rate Resp SpO2   05/11/18 1205 144/69 98.1  F (36.7  C) Oral - 16 95 %   05/11/18 0758 149/70 98  F (36.7  C) Oral 69 16  95 %   05/11/18 0647 157/72 - - 62 - -[JT1.4]     On physical exam the patient was alert but confused, sitting up in chair  Head showed no trauma, pupils round and reactive to light  Mouth shows good dentition, posterior pharynx is clear  Neck was supple  Lungs are clear bilateral breath sounds  Cardiac exam shows regular rhythm normal S1-S2 no murmur rub or gallop  Abdomen soft and nontender no bruit  Extremities show diminished pulses in the feet and no edema  Rodriguez catheter in place  Neurologic exam shows that he is confused but alert he is moving all 4 extremities is cranial nerves appear symmetric           Data:[JT1.1]     Lab Results   Component Value Date    WBC 7.0 05/02/2018    WBC 8.5 05/01/2018    HGB 9.0 (L) 05/04/2018    HGB 9.0 (L) 05/02/2018    HGB 10.1 (L) 05/01/2018    HCT 26.7 (L) 05/02/2018    HCT 30.0 (L) 05/01/2018     (L) 05/02/2018     (L) 05/01/2018     05/11/2018     05/10/2018     05/09/2018    POTASSIUM 3.9 05/11/2018    POTASSIUM 4.3 05/10/2018    POTASSIUM 3.5 05/09/2018    CHLORIDE 112 (H) 05/11/2018    CHLORIDE 112 (H) 05/10/2018    CHLORIDE 113 (H) 05/09/2018    CO2 23 05/11/2018    CO2 24 05/10/2018    CO2 23 05/09/2018    BUN 58 (H) 05/11/2018    BUN 50 (H) 05/10/2018    BUN 44 (H) 05/09/2018    CR 3.59 (H) 05/11/2018    CR 3.55 (H) 05/10/2018    CR 3.34 (H) 05/09/2018     (H) 05/11/2018    GLC 93 05/10/2018     (H) 05/09/2018    TROPI 0.020 05/01/2018    AST 14 05/01/2018    ALT 11 05/01/2018    ALKPHOS 74 05/01/2018    BILITOTAL 0.3 05/01/2018    INR 1.15 (H) 05/01/2018[JT1.5]          Revision History        User Key Date/Time User Provider Type Action    > JT1.5 5/11/2018  2:38 PM Kelvin Amaya MD Physician Sign     JT1.4 5/11/2018  2:25 PM Kelvin Amaya MD Physician      JT1.3 5/11/2018  2:23 PM Kelvin Amaya MD Physician      JT1.2 5/11/2018  2:18 PM Kelvin Amaya MD Physician      JT1.1  5/11/2018  2:17 PM Kelvin Amaya MD Physician             Consults signed by Joey Pizarro MD at 5/3/2018  3:19 PM      Author:  Joey Pizarro MD Service:  Urology Author Type:  Physician    Filed:  5/3/2018  3:19 PM Date of Service:  5/3/2018  1:15 PM Creation Time:  5/3/2018  1:49 PM    Status:  Addendum :  Joey Pizarro MD (Physician)         Consult Date:  05/03/2018      UROLOGY INPATIENT CONSULTATION       DATE OF CONSULTATION:  05/03/2018      REASON FOR CONSULTATION:  Difficult Rodriguez catheter placement and acute renal failure.      HISTORY OF PRESENT ILLNESS:  Yuriy Ghosh is a 89-year-old gentleman who was admitted to the observation unit after a fall.  He was found to have an elevated creatinine above 3 as well.  On CT scan in the Emergency Department he was found to have normal kidneys with no hydronephrosis and did not have a distended bladder, but did have a markedly enlarged prostate.  He takes Uroxatral for this.  In the Emergency Department attempts were made at placing a Rodriguez catheter which were unsuccessful.  It is thought his enlarged prostate might be contributing to his renal failure.  Straight Rodriguez catheter was requested.        I prepped and draped the penis in the standard sterile fashion.  I inserted a 20-Mosotho coude tip Rodriguez catheter without any difficulty.  The bladder drained 450 mL of clear urine.      RECOMMENDATIONS:  The patient's creatinine should be rechecked tomorrow to see if it improves.  The patient does have high postvoid residuals but he is not in retention.  If the creatinine improves with Rodriguez catheter he should be seen in the Urology Clinic to discuss long-term catheterization options.  If the creatinine does not improve Rodriguez catheter can simply be removed, he is not in retention and would not need a Rodriguez for this purpose.  Also, since he had a fall he should stop taking his[SB1.1] Cardura[SB1.2] and be switched to  finasteride[SB1.1] for his enlarged prostate[SB1.2] instead.        Please contact me during this hospitalization if you have any further questions.         JOEY PIZARRO MD             D: 2018   T: 2018   MT: MAGGIE      Name:     SHY BELLA   MRN:      [id number]        Account:       [id number]   :      1926           Consult Date:  2018      Document: [id number][SB1.1]         Revision History        User Key Date/Time User Provider Type Action    > SB1.2 5/3/2018  3:19 PM Joey Pizarro MD Physician Addend     SB1.1 5/3/2018  3:17 PM Joey Pizarro MD Physician Sign                     Progress Notes - Physician (Notes from 18 through 05/15/18)      Progress Notes by Marian Moreno LSW at 5/15/2018 10:59 AM     Author:  Marian Moreno LSW Service:  Social Work Author Type:      Filed:  5/15/2018 12:10 PM Date of Service:  5/15/2018 10:59 AM Creation Time:  5/15/2018 10:59 AM    Status:  Addendum :  Marian Moreno LSW ()         Kimberly Progress Note  Chart Reviewed, Pt discussed in Interdisciplinary Rounds.   Anticipate that pt could be medically cleared today for discharge to [facility].    Intervention:   KIMBERLY called and left message with Emeterio at [facility] to verify room for today.  Pt needs medical sandy[SC1.1]r[SC1.2]nce from multiple disciplines prior to discharge.[SC1.1]   Discharge orders are entered into epic. Discharge orders sent via [facility].[SC1.2]  In anticipation of discharge, transportation has been ordered ahead of time through . Cassie has scheduled a stretcher transport for 16:00 today. PCS form completed and on pt chart.[SC1.1] Pt requires supervision during transport for safety because of his confusion; he may require redirection during transport.  Pt and SO updated with discharge plan. Eva indicates that she will meet pt at [facility] this afternoon.[SC1.2]  Team Members notified:    RN,CC,HUC,BB[SC1.1]    PAS-RR    D: Per DHS regulation, SW completed and submitted PAS-RR to [place] [employer] on [employer]ging Direct Connect via the [facility] LinkAge Line.  PAS-RR confirmation # is : [id number]  P: Further questions may be directed to [facility] LinkAge Line at #[phone], option #4 for PAS-RR staff.[SC1.2]        Plan: Discharge to [facility][SC1.1] today via HE stretcher at    [facility]  [facility]  Phone: [phone]   16:00st[SC1.2]           Revision History        User Key Date/Time User Provider Type Action    > SC1.2 5/15/2018 12:10 PM Marian Moreno LSW  Addend     SC1.1 5/15/2018 11:11 AM Marian Moreno LSW  Sign            Progress Notes by SHYLA Pemberton MD at 5/15/2018 11:41 AM     Author:  SHYLA Pemberton MD Service:  Nephrology Author Type:  Physician    Filed:  5/15/2018 11:43 AM Date of Service:  5/15/2018 11:41 AM Creation Time:  5/15/2018 11:41 AM    Status:  Signed :  SHYLA Pemberton MD (Physician)         Renal Medicine Progress Note                                Yuriy Ghosh MRN# [id number]   Age: 89 year old YOB: 1926   Date of Admission: 5/1/2018 Hospital LOS: 12                  Assessment/Plan:     Admitted post fall at home.  Course complicated by acute on chronic renal failure.  Evaluated in that context.    1.  CKD  III   -creatinine 2.0 last fall   -review of available creatinine raise concern for progressive decline in GFR  2.  Proteinuria   -dipstick positive  3.  ARF   -nonoliguric   -workup non diagnostic  4.  BPH   -continue ware  5.  HTN      OK for DC from renal standpoint  FU renal NP 2 to 3 weeks  BMP next 5 to 7 days    No diuretic at this time          Interval History:     Up at bedside eating lunch.  Follows.  IO and UO reviewed.  No complaints.    IO and UO reviewed      ROS:     GENERAL: NAD, No fever,chills  R: NEGATIVE for significant cough or SOB  CV: NEGATIVE for chest pain,  palpitations  EXT: no change edema  ROS otherwise negative    Medications and Allergies:     Reviewed    Physical Exam:     Vitals were reviewed  Patient Vitals for the past 8 hrs:   BP Temp Temp src Heart Rate Resp SpO2   05/15/18 0738 169/72 97  F (36.1  C) Oral 55 16 94 %   05/15/18 0630 178/71 - - - - -     I/O last 3 completed shifts:  In: 340 [P.O.:340]  Out: -     Vitals:    05/01/18 1928 05/03/18 0157 05/12/18 0230   Weight: 61.7 kg (136 lb) 65.8 kg (145 lb) 64.9 kg (143 lb 1.6 oz)         GENERAL: awake, alert, follows  HEENT: NC/AT, PERRLA, EOMI, non icteric, pharynx moist without lesion  RESP: symmetric excursion, good effort, lungs clear - no rales, rhonchi or wheezes  RESP:  clear anteriorly  CV: RRR, normal S1 S2  ABDOMEN: soft, nontender, no HSM or masses and bowel sounds normal  MS: no clubbing, cyanosis   SKIN: clear without significant rashes or lesions  NEURO: speech normal and cranial nerves 2-12 intact  EXT: warm, no edema    Data:       Recent Labs  Lab 05/15/18  0725 05/14/18  0700 05/13/18  0709 05/12/18  0625    142 142 142   POTASSIUM 4.1 4.2 4.7 4.5   CHLORIDE 112* 114* 113* 113*   CO2 21 20 21 19*   ANIONGAP 8 8 8 10   GLC 89 88 104* 137*   BUN 68* 74* 85* 76*   CR 3.98* 3.90* 4.22* 3.76*   GFRESTIMATED 14* 15* 13* 15*   GFRESTBLACK 17* 18* 16* 18*   TEMO 7.3* 7.0* 7.0* 6.8*         Recent Labs   Lab Test  05/15/18   0725  05/14/18   0700  05/13/18   0709  05/12/18   0625  05/11/18   0615  05/10/18   0659  05/09/18   1312  05/08/18   0829  05/07/18   1042  05/05/18   0700   CR  3.98*  3.90*  4.22*  3.76*  3.59*  3.55*  3.34*  3.27*  2.95*  2.93*       Recent Labs  Lab 05/15/18  0725 05/14/18  0700 05/13/18  2355 05/13/18  1820  05/11/18  0615   PHOS  --  3.6  --   --   --  3.0   HGB 7.8* 7.5* 7.5* 7.9*  < >  --    < > = values in this interval not displayed.      SHYLA Pemberton    [facility] - Nephrology  [phone][GO1.1]     Revision History        User Key Date/Time  User Provider Type Action    > GO1.1 5/15/2018 11:43 AM SHYLA Pemberton MD Physician Sign            Progress Notes by Carol Lyn RD, LD at 5/15/2018 11:05 AM     Author:  Carol Lyn RD, LD Service:  Nutrition Author Type:  Registered Dietitian    Filed:  5/15/2018 11:08 AM Date of Service:  5/15/2018 11:05 AM Creation Time:  5/15/2018 11:05 AM    Status:  Signed :  Carol Lyn RD, LD (Registered Dietitian)         BRIEF NUTRITION REASSESSMENT      REASON FOR REASSESSMENT:  LOS      CURRENT DIET AND INTAKE:  Diet:  Regular           Not Appropriate for Room Service              Chart reviewed  Pt alert to self only  Plan dc to TCU when stable  He has been receiving standard meal trays TID  Flowsheets reflect intake of % meals  Breakfast today - OJ, milk, Cheerios, eggs, English muffin    ANTHROPOMETRICS:[SJ1.1]  Vitals:    05/01/18 1928 05/03/18 0157 05/12/18 0230   Weight: 61.7 kg (136 lb) 65.8 kg (145 lb) 64.9 kg (143 lb 1.6 oz)[SJ1.2]         LABS:  Labs noted      NUTRITION INTERVENTION:  Nutrition Diagnosis:  No nutrition diagnosis at this time.    Implementation:  No intervention at this time    FOLLOW UP/MONITORING:   Will re-evaluate in 7 - 10 days, or sooner, if re-consulted.[SJ1.1]             Revision History        User Key Date/Time User Provider Type Action    > SJ1.2 5/15/2018 11:08 AM Carol Lyn RD, LD Registered Dietitian Sign     SJ1.1 5/15/2018 11:05 AM Carol Lyn RD, LD Registered Dietitian             Progress Notes by SHYLA Pemberton MD at 5/14/2018  2:07 PM     Author:  SHYLA Pemberton MD Service:  Nephrology Author Type:  Physician    Filed:  5/14/2018  2:17 PM Date of Service:  5/14/2018  2:07 PM Creation Time:  5/14/2018  2:07 PM    Status:  Signed :  SHYLA Pemberton MD (Physician)         Renal Medicine Progress Note                                Yuriy Ghosh MRN# [id number]   Age: 89 year old Date of  Birth: 5/4/1926   Date of Admission: 5/1/2018 Hospital LOS: 11                  Assessment/Plan:     Admitted post fall at home.  Course complicated by acute on chronic renal failure.  Evaluated in that context.    1.  CKD  III   -creatinine 2.0 last fall   -review of available creatinine raise concern for progressive decline in GFR  2.  Proteinuria   -dipstick positive  3.  ARF   -nonoliguric   -workup non diagnostic  4.  BPH   -continue ware  5.  HTN      Modest improvement in renal function today  Follow labs  No IVF today          Interval History:     In bed.  Follows.  IO and UO reviewed.  No complaints        ROS:     GENERAL: NAD, No fever,chills  R: NEGATIVE for significant cough or SOB  CV: NEGATIVE for chest pain, palpitations  EXT: no change edema  ROS otherwise negative    Medications and Allergies:     Reviewed    Physical Exam:     Vitals were reviewed[GO1.1]  Patient Vitals for the past 8 hrs:   BP Temp Temp src Heart Rate Resp SpO2   05/14/18 1334 139/55 - - 58 - -   05/14/18 1126 148/57 96.7  F (35.9  C) Oral 53 16 95 %   05/14/18 0946 - - - 56 - -   05/14/18 0827 147/55 98  F (36.7  C) Oral - 18 96 %     I/O last 3 completed shifts:  In: 1957 [P.O.:1360; I.V.:297]  Out: 190 [Urine:190][GO1.2]    Vitals:    05/01/18 1928 05/03/18 0157 05/12/18 0230   Weight: 61.7 kg (136 lb) 65.8 kg (145 lb) 64.9 kg (143 lb 1.6 oz)         GENERAL: awake, alert, follows  HEENT: NC/AT, PERRLA, EOMI, non icteric, pharynx moist without lesion  RESP: symmetric excursion, good effort, lungs clear - no rales, rhonchi or wheezes  RESP:  clear anteriorly  CV: RRR, normal S1 S2  ABDOMEN: soft, nontender, no HSM or masses and bowel sounds normal  MS: no clubbing, cyanosis   SKIN: clear without significant rashes or lesions  NEURO: speech normal and cranial nerves 2-12 intact  EXT: warm, no edema    Data:       Recent Labs  Lab 05/14/18  0700 05/13/18  0709 05/12/18  0625 05/11/18  0615    142 142 142   POTASSIUM 4.2  4.7 4.5 3.9   CHLORIDE 114* 113* 113* 112*   CO2 20 21 19* 23   ANIONGAP 8 8 10 7   GLC 88 104* 137* 105*   BUN 74* 85* 76* 58*   CR 3.90* 4.22* 3.76* 3.59*   GFRESTIMATED 15* 13* 15* 16*   GFRESTBLACK 18* 16* 18* 19*   TEMO 7.0* 7.0* 6.8* 7.4*[GO1.1]         Recent Labs   Lab Test  05/14/18   0700  05/13/18   0709  05/12/18   0625  05/11/18   0615  05/10/18   0659  05/09/18   1312  05/08/18   0829  05/07/18   1042  05/05/18   0700  05/04/18   1637   CR  3.90*  4.22*  3.76*  3.59*  3.55*  3.34*  3.27*  2.95*  2.93*  2.88*       Recent Labs  Lab 05/14/18  0700 05/13/18  2355 05/13/18  1820 05/13/18  1405  05/11/18  0615   PHOS 3.6  --   --   --   --  3.0   HGB 7.5* 7.5* 7.9* 7.9*  < >  --    < > = values in this interval not displayed.[GO1.3]      SHYLA Pemberton    [facility] - Nephrology  [phone][GO1.1]     Revision History        User Key Date/Time User Provider Type Action    > GO1.3 5/14/2018  2:17 PM SHYLA Pemberton MD Physician Sign     GO1.2 5/14/2018  2:16 PM SHYLA Pemberton MD Physician      GO1.1 5/14/2018  2:07 PM SHYLA Pemberton MD Physician             Progress Notes by Tutu Crooks DO at 5/14/2018 10:07 AM     Author:  Tutu Crooks DO Service:  Hospitalist Author Type:  Physician    Filed:  5/14/2018  1:45 PM Date of Service:  5/14/2018 10:07 AM Creation Time:  5/14/2018 10:07 AM    Status:  Signed :  Tutu Crooks DO (Physician)         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      Bleeding duodenal ulcer  Acute on chronic blood loss anemia   Near syncope  On the morning  of 5/12 the patient had a bowel movement with maroon colored stools and then had a near syncopal episode.  A RRT was called and the patient appeared pale and a CBC was obtained.  His hemoglobin had dropped significantly from admission and was down to 5.7.  Baseline appears to be 9-10 which was where he was on presentation.  Patient received 1 unit of PRBCs on 5/12.  GI was consulted and Dr. Alcantar took the patient for an EGD on 5/12 as well and found a bleeding duodenal ulcer.  On rechecks patient has required an additional 3 units of blood for a total of 4 units now   - GI following and appreciate their recommendations  - Will continue Hgb checks q6hr with conditional units of blood ordered to transfuse if <7  - Protonix 40 mg IV daily       SARAHI on CKD  Previous Baseline 1.4? (stable in this range 2014 and 2016), Cr 2.0 in CareEverywhere 11/17.  Admission UA with > 182 RBC (possibly from traumatic cath in the ED) and >300 protein (also note UA 11/2017 with >300 protein and moderate blood).  CT stone negative for CT or hydronephrosis, but noted very large prostate.  Renal US negative for hydronephrosis.  No significant improvement in function following ware placement.  Suspect CKD due to uncontrolled HTN.  Had only been taking Norvasc for BP PTA. No ACE/ARB or diuretic or BB as had been prescribed by PCP. Denies NSAIDs.  - Cr had transiently decreased to 2.9 while on IVF.  On 5/11 patient began to have steadily worsening renal function and was up to 4.22 on 5/13 but back to 3.9 after IVF  - Nephrology re-consulted given the worsening Cr and appreciate their recommendations       BPH   Ware placed 5/3.  Urology recommended continuing ware until seen in f/u with Dr. Pizarro 5/15/18.  PTA Cardura stopped due to his falls and he was placed on Finasteride 5 mg daily this stay.  - Continue Cardura      Essential hypertension  Patient was only receiving Norvasc 5 mg/d at home. Was suppose to be on BB, ACE and diuretic  but had not been filling these.   - Continue Norvasc 10 mg daily  - Decreased Hydralazine down to 50 mg TID as blood pressures have been soft with the GI bleed  - Avoid BB due to bradycardia and Ace-I/ARB or diuretic due to CKD      Peripheral arterial disease  History of AAA endograft repair about 4 years ago.    - Holding aspirin due to above      Sinus Bradycardia  EKG on admission with HR 40-50s with Mobitz, 1.  Note drop to 40's on 5/2 after beta-blocker was re-introduced, now discontinued.  Echo with EF 45-50%, mild global hypokinesia of LV, dilated LA, 1-2+ MR and TR, Pulm HTN, mod AV stenosis (no prior for comparison). Patient does not appear to be symptomatic but difficult determine with advanced dementia.  SO states patient had not been on Metoprolol PTA.   - No further beta-blockers, rates have been stable      Mechanical Fall    No seizure activity reported. Witnessed by his wife and was reported that he tripped over the steps outside of his home and no LOC reported. Unable to determine from patient if dizzy prior to fall or any other sx's given dementia. No events other than bradycardia on tele.  TTE as above.  - Therapies recommend TCU      Hypothyroidism  - Continue levothyroxine 125 mcg daily      Seizure disorder  Keppra level <2 on admit.  Likely due to non-compliance as was placed on PTA dose with therapeutic level noted 5/10.    - Continue Keppra 250 mg QHS      Dementia  - Continue PTA memantine 10 mg BID, donepezil 10 mg QHS  - Seroquel 25 every evening and 12.5 mg Bid prn  - Celexa has been discontinued due to prolonged QTc[JH1.2]    # Pain Assessment:  Current Pain Score 5/14/2018   Patient currently in pain? denies   Pain score (0-10) -[JH1.1]   Yuriy s pain level was assessed and he currently denies pain.[JH1.2]        DVT Prophylaxis:[JH1.1] Pneumatic Compression Devices[JH1.2]  Code Status: Prior    Disposition: Expected discharge in[JH1.1] 1-2[JH1.2] days[JH1.1] once cleared by GI and  nephrology[JH1.2].[JH1.1]  Should go to TCU[JH1.2]    Tutu Crooks, DO  Text Page (7am to 6pm)    Interval History[JH1.1]   Patient seen and examined.  No pain currently.  Denies and difficulty breathing.  Had a bowel movement this AM without evidence of bleeding.[JH1.2]     -Data reviewed today: I reviewed all new labs and imaging results over the last 24 hours. I personally reviewed[JH1.1] no images or EKG's today[JH1.2].    Physical Exam   Temp: 98  F (36.7  C) Temp src: Oral BP: 147/55 Pulse: 54 Heart Rate: 56 Resp: 18 SpO2: 96 % O2 Device: None (Room air)    Vitals:    05/01/18 1928 05/03/18 0157 05/12/18 0230   Weight: 61.7 kg (136 lb) 65.8 kg (145 lb) 64.9 kg (143 lb 1.6 oz)     Vital Signs with Ranges  Temp:  [96.1  F (35.6  C)-98.2  F (36.8  C)] 98  F (36.7  C)  Pulse:  [54-56] 54  Heart Rate:  [] 56  Resp:  [14-18] 18  BP: (126-147)/(49-74) 147/55  SpO2:  [96 %-97 %] 96 %  I/O last 3 completed shifts:  In: 1957 [P.O.:1360; I.V.:297]  Out: 190 [Urine:190]    Constitutional: Awake, alert, cooperative, no apparent distress  Respiratory: Clear to auscultation bilaterally, no crackles or wheezing  Cardiovascular:[JH1.1] Bradycardia[JH1.2], normal S1 and S2, and murmur noted  GI: Normal bowel sounds, soft, non-distended, non-tender  Skin/Integumen: No rashes, no cyanosis  MSK: No edema     Medications     - MEDICATION INSTRUCTIONS -         amLODIPine (NORVASC) tablet 10 mg  10 mg Oral Daily     citalopram (celeXA) tablet 20 mg  20 mg Oral Daily     donepezil (ARICEPT) tablet 10 mg  10 mg Oral At Bedtime     finasteride  5 mg Oral Daily     hydrALAZINE  50 mg Oral Q8H RAMIRO     levETIRAcetam  250 mg Oral At Bedtime     levothyroxine (SYNTHROID/LEVOTHROID) tablet 125 mcg  125 mcg Oral QAM AC     memantine  10 mg Oral BID     pantoprazole (PROTONIX) IV  40 mg Intravenous Daily with breakfast     QUEtiapine  25 mg Oral At Bedtime       Data     Recent Labs  Lab 05/14/18  0700 05/13/18  2355 05/13/18  1520   05/13/18  0709  05/12/18  0625   WBC  --   --   --   --  12.0*  --  11.5*   HGB 7.5* 7.5* 7.9*  < > 6.9*  < > 5.7*  Canceled, Test credited   MCV  --   --   --   --  87  --  87   PLT  --   --   --   --  116*  --  151   INR  --   --   --   --   --   --  1.26*     --   --   --  142  --  142   POTASSIUM 4.2  --   --   --  4.7  --  4.5   CHLORIDE 114*  --   --   --  113*  --  113*   CO2 20  --   --   --  21  --  19*   BUN 74*  --   --   --  85*  --  76*   CR 3.90*  --   --   --  4.22*  --  3.76*   ANIONGAP 8  --   --   --  8  --  10   TEMO 7.0*  --   --   --  7.0*  --  6.8*   GLC 88  --   --   --  104*  --  137*   < > = values in this interval not displayed.    Imaging:   No results found for this or any previous visit (from the past 24 hour(s)).[JH1.1]       Revision History        User Key Date/Time User Provider Type Action    > JH1.2 5/14/2018  1:45 PM Tutu Crooks, DO Physician Sign     JH1.1 5/14/2018 10:07 AM Tutu Crooks, DO Physician             Progress Notes by Lexus Solano LSW at 5/14/2018 10:30 AM     Author:  Lexus Solano LSW Service:  (none) Author Type:      Filed:  5/14/2018 10:31 AM Date of Service:  5/14/2018 10:30 AM Creation Time:  5/14/2018 10:30 AM    Status:  Signed :  Lexus Solano LSW ()         KIMBERLY  I: KIMBERLY updated Tamara at [facility] and they will no longer be following at this time. If patient d/c'es home another Vulnerable adult report will need to be made.     P: KIMBERLY will continue to follow and assist as needed.    MADDY Jimenez   *[id number][SM1.1]       Revision History        User Key Date/Time User Provider Type Action    > SM1.1 5/14/2018 10:31 AM Lexus Solano LSW  Sign            Progress Notes by Tutu Crooks DO at 5/13/2018 10:14 AM     Author:  Tutu Crooks DO Service:  Hospitalist Author Type:  Physician    Filed:  5/13/2018  1:32 PM  Date of Service:  5/13/2018 10:14 AM Creation Time:  5/13/2018 10:14 AM    Status:  Signed :  Tutu Crooks DO (Physician)         [facility]    Hospitalist Progress Note    Assessment & Plan[JH1.1]   Yuriy Ghosh is a 89 year old male with history including hypertension, 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     Bleeding duodenal ulcer  Acute on chronic blood loss anemia   Near syncope  On the morning of 5/12 the patient had a bowel movement with maroon colored stools and then had a near syncopal episode.  A RRT was called and the patient appeared pale and a CBC was obtained.  His hemoglobin had dropped significantly from admission and was down to 5.7.  Baseline appears to be 9-10 which was where he was on presentation.  Patient received 1 unit of PRBCs on 5/12.  GI was consulted and Dr. Alcantar took the patient for an EGD on 5/12 as well and found a bleeding duodenal ulcer.  On rechecks patient has required an additional 3 units of blood for a total of 4 units now   - GI following and appreciate their recommendations  - Will continue Hgb checks q6hr with conditional units of blood ordered to transfuse if <7  - Protonix 40 mg IV daily        SARAHI on CKD  Previous Baseline 1.4? (stable in this range 2014 and 2016), Cr 2.0 in CareEverywhere 11/17.  Admission UA with > 182 RBC (possibly from traumatic cath in the ED) and >300 protein (also note UA 11/2017 with >300 protein and moderate blood).  CT stone negative for CT or hydronephrosis, but noted very large prostate.  Renal US negative for hydronephrosis.  No significant improvement in function following ware placement.  Suspect CKD due to uncontrolled HTN.  Had only been taking Norvasc for BP PTA. No ACE/ARB or diuretic  or BB as had been prescribed by PCP. Denies NSAIDs.  - Cr had transiently decreased to 2.9 while on IVF.  On 5/11 patient began to have steadily worsening renal function and was up to 4.22 on 5/13  - Nephrology re-consulted given the worsening Cr and appreciate their recommendations       BPH   Ware placed 5/3.  Urology recommended continuing ware until seen in f/u with Dr. Pizarro 5/15/18.  PTA Cardura stopped due to his falls and he was placed on Finasteride 5 mg daily this stay.  - Continue Cardura      Essential hypertension  Patient was only receiving Norvasc 5 mg/d at home. Was suppose to be on BB, ACE and diuretic but had not been filling these.   - Continue Norvasc 10 mg daily  - Decreased Hydralazine down to 50 mg TID as blood pressures have been soft with the GI bleed  - Avoid BB due to bradycardia and Ace-I/ARB or diuretic due to CKD      Peripheral arterial disease  History of AAA endograft repair about 4 years ago.    - Holding aspirin due to above     Sinus Bradycardia  EKG on admission with HR 40-50s with Lukasz, 1.  Note drop to 40's on 5/2 after beta-blocker was re-introduced, now discontinued.  Echo with EF 45-50%, mild global hypokinesia of LV, dilated LA, 1-2+ MR and TR, Pulm HTN, mod AV stenosis (no prior for comparison). Patient does not appear to be symptomatic but difficult determine with advanced dementia.  SO states patient had not been on Metoprolol PTA.   - No further beta-blockers, rates have been stable      Mechanical Fall    No seizure activity reported. Witnessed by his wife and was reported that he tripped over the steps outside of his home and no LOC reported. Unable to determine from patient if dizzy prior to fall or any other sx's given dementia. No events other than bradycardia on tele.  TTE as above.  - Therapies recommend TCU      Hypothyroidism  - Continue levothyroxine 125 mcg daily      Seizure disorder  Keppra level <2 on admit.  Likely due to non-compliance as was  placed on PTA dose with therapeutic level noted 5/10.    - Continue Keppra 250 mg QHS      Dementia  - Continue PTA memantine 10 mg BID, donepezil 10 mg QHS  - Seroquel 25 every evening and 12.5 mg Bid prn  - Celexa has been discontinued due to prolonged QTc[JH1.2]    # Pain Assessment:  Current Pain Score 5/13/2018   Patient currently in pain? denies   Pain score (0-10) -[JH1.1]   Yuriy knapp pain level was assessed and he currently denies pain.[JH1.2]        DVT Prophylaxis:[JH1.1] Pneumatic Compression Devices[JH1.2]  Code Status: Prior    Disposition: Expected discharge in[JH1.1] 1-2[JH1.2] days once[JH1.1] hemoglobins stable and Cr improving[JH1.2].[JH1.1]  Will still plan on TCU as previously planned[JH1.2]    Tutu Crooks, DO  Text Page (7am to 6pm)    Interval History[JH1.1]   Patient seen and examined.  No pain currently.  No difficulty breathing.  No further bloody stools per nursing.[JH1.2]      -Data reviewed today: I reviewed all new labs and imaging results over the last 24 hours. I personally reviewed[JH1.1] no images or EKG's today[JH1.2].    Physical Exam   Temp: 98.6  F (37  C) Temp src: Oral BP: 129/50 Pulse: 50 Heart Rate: 70 Resp: 16 SpO2: 95 % O2 Device: None (Room air)    Vitals:    05/01/18 1928 05/03/18 0157 05/12/18 0230   Weight: 61.7 kg (136 lb) 65.8 kg (145 lb) 64.9 kg (143 lb 1.6 oz)     Vital Signs with Ranges  Temp:  [96.8  F (36  C)-99.3  F (37.4  C)] 98.6  F (37  C)  Pulse:  [50-54] 50  Heart Rate:  [51-71] 70  Resp:  [12-21] 16  BP: ()/(46-84) 129/50  SpO2:  [95 %-100 %] 95 %  I/O last 3 completed shifts:  In: 775 [P.O.:420]  Out: 350 [Urine:250; Stool:100]    Constitutional: Pale.  Awake, alert, cooperative  Respiratory: Clear to auscultation bilaterally, no crackles or wheezing  Cardiovascular: Regular rate and rhythm, normal S1 and S2, and systolic murmur noted  GI: Normal bowel sounds, soft, non-distended, non-tender  Skin/Integumen: No rashes, no cyanosis  MSK: No  edema     Medications     - MEDICATION INSTRUCTIONS -         amLODIPine (NORVASC) tablet 10 mg  10 mg Oral Daily     citalopram (celeXA) tablet 20 mg  20 mg Oral Daily     donepezil (ARICEPT) tablet 10 mg  10 mg Oral At Bedtime     finasteride  5 mg Oral Daily     hydrALAZINE  50 mg Oral Q8H RAMIRO     levETIRAcetam  250 mg Oral At Bedtime     levothyroxine (SYNTHROID/LEVOTHROID) tablet 125 mcg  125 mcg Oral QAM AC     memantine  10 mg Oral BID     pantoprazole (PROTONIX) IV  40 mg Intravenous Daily with breakfast     QUEtiapine  25 mg Oral At Bedtime       Data     Recent Labs  Lab 05/13/18  0709 05/13/18  0001 05/12/18  1435 05/12/18  0625  05/11/18  0615   WBC 12.0*  --   --  11.5*  --   --    HGB 6.9* 7.4* 6.0* 5.7*  Canceled, Test credited  < >  --    MCV 87  --   --  87  --   --    *  --   --  151  --   --    INR  --   --   --  1.26*  --   --      --   --  142  --  142   POTASSIUM 4.7  --   --  4.5  --  3.9   CHLORIDE 113*  --   --  113*  --  112*   CO2 21  --   --  19*  --  23   BUN 85*  --   --  76*  --  58*   CR 4.22*  --   --  3.76*  --  3.59*   ANIONGAP 8  --   --  10  --  7   TEMO 7.0*  --   --  6.8*  --  7.4*   *  --   --  137*  --  105*   < > = values in this interval not displayed.    Imaging:   No results found for this or any previous visit (from the past 24 hour(s)).[JH1.1]      Revision History        User Key Date/Time User Provider Type Action    > JH1.2 5/13/2018  1:32 PM Tutu Crooks, DO Physician Sign     JH1.1 5/13/2018 10:14 AM Tutu Crooks, DO Physician             Progress Notes by Kelvin Amaya MD at 5/13/2018  1:04 PM     Author:  Kelvin Amaya MD Service:  Nephrology Author Type:  Physician    Filed:  5/13/2018  1:16 PM Date of Service:  5/13/2018  1:04 PM Creation Time:  5/13/2018  1:04 PM    Status:  Signed :  Kelvin Amaya MD (Physician)                  Assessment and Plan:   SARAHI: CKD. Asked to see pt again  due to rising Cr. Felt to have CKD due to hypertension, vascular disease and prostatic obstruction. Rodriguez is out and he has been seen by Urology.   Labs show K 4.7, HCO3 21, Cr up to 4.22. It has steadily increased over the last week.   UprV 7.68 gm/gm, . Imaging does not show obstruction, and is consistent with BPH and CKD.   His wt is up 3.2 kg since adm. I/O inaccurate.   No obvious nephrotoxic exposure.     Will repeat U/A and UprV. Trial of IVF. Follow labs.             Interval History:   AAA repair  Hypertension: norvasc, hydralazine  BPH: on finasteride.   Dementia       Low Hgb: getting 1 U PRBC today. On IV protonix.  Found to have bleeding DU on EGD yesterday.  Hgb today 6.9.             Review of Systems:   No SOB or CP. Taking po well. Confused.           Medications:[JT1.1]       amLODIPine (NORVASC) tablet 10 mg  10 mg Oral Daily     citalopram (celeXA) tablet 20 mg  20 mg Oral Daily     donepezil (ARICEPT) tablet 10 mg  10 mg Oral At Bedtime     finasteride  5 mg Oral Daily     hydrALAZINE  50 mg Oral Q8H RAMIRO     levETIRAcetam  250 mg Oral At Bedtime     levothyroxine (SYNTHROID/LEVOTHROID) tablet 125 mcg  125 mcg Oral QAM AC     memantine  10 mg Oral BID     pantoprazole (PROTONIX) IV  40 mg Intravenous Daily with breakfast     QUEtiapine  25 mg Oral At Bedtime       - MEDICATION INSTRUCTIONS -[JT1.2]       Current active medications and PTA medications reviewed, see medication list for details.            Physical Exam:   Vitals were reviewed  Patient Vitals for the past 24 hrs:   BP Temp Temp src Pulse Heart Rate Resp SpO2   05/13/18 1210 142/63 96.9  F (36.1  C) Oral 56 110 16 96 %   05/13/18 1041 126/49 - - 55 99 14 96 %   05/13/18 0941 129/50 98.6  F (37  C) Oral 50 - 16 95 %   05/13/18 0918 130/55 98.2  F (36.8  C) Oral 51 - 16 95 %   05/13/18 0806 133/53 98.1  F (36.7  C) Oral 54 70 16 96 %   05/13/18 0600 142/68 - - - 62 - -   05/13/18 0019 130/60 98.9  F (37.2  C) Oral - 58 16 97 %    18 139/62 99.3  F (37.4  C) Oral - 54 - 97 %   18 149/67 99  F (37.2  C) Oral - 56 16 96 %   18 139/53 98.9  F (37.2  C) Oral - 67 16 97 %   18 140/54 98  F (36.7  C) Oral - 57 16 97 %   18 191 153/73 98.5  F (36.9  C) Axillary - 58 16 97 %   18 1736 133/67 97.1  F (36.2  C) Axillary - 59 17 98 %   18 1627 144/57 98.8  F (37.1  C) Axillary - - 17 98 %   18 1555 134/54 98.8  F (37.1  C) Axillary - 60 15 97 %   18 1321 113/49 96.8  F (36  C) Axillary - 51 16 96 %       Temp:  [96.8  F (36  C)-99.3  F (37.4  C)] 96.9  F (36.1  C)  Pulse:  [50-56] 56  Heart Rate:  [] 110  Resp:  [14-17] 16  BP: (113-153)/(49-73) 142/63  SpO2:  [95 %-98 %] 96 %    Temperatures:  Current - Temp: 96.9  F (36.1  C); Max - Temp  Av.3  F (36.8  C)  Min: 96.8  F (36  C)  Max: 99.3  F (37.4  C)  Respiration range: Resp  Avg: 15.9  Min: 14  Max: 17  Pulse range: Pulse  Av.2  Min: 50  Max: 56  Blood pressure range: Systolic (24hrs), Av , Min:113 , Max:153   ; Diastolic (24hrs), Av, Min:49, Max:73    Pulse oximetry range: SpO2  Av.5 %  Min: 95 %  Max: 98 %    I/O last 3 completed shifts:  In: 775 [P.O.:420]  Out: 350 [Urine:250; Stool:100]      Intake/Output Summary (Last 24 hours) at 18 1304  Last data filed at 18 0807   Gross per 24 hour   Intake             1040 ml   Output              250 ml   Net              790 ml       Alert, responsive  Lungs with clear BS  Cor RRR nl S1 S2 2/6 sys M  LE 1-2+ edema       Wt Readings from Last 4 Encounters:   18 64.9 kg (143 lb 1.6 oz)          Data:          Lab Results   Component Value Date     2018     2018     2018    Lab Results   Component Value Date    CHLORIDE 113 2018    CHLORIDE 113 2018    CHLORIDE 112 2018    Lab Results   Component Value Date    BUN 85 2018    BUN 76 2018    BUN 58 2018      Lab  Results   Component Value Date    POTASSIUM 4.7 05/13/2018    POTASSIUM 4.5 05/12/2018    POTASSIUM 3.9 05/11/2018    Lab Results   Component Value Date    CO2 21 05/13/2018    CO2 19 05/12/2018    CO2 23 05/11/2018    Lab Results   Component Value Date    CR 4.22 05/13/2018    CR 3.76 05/12/2018    CR 3.59 05/11/2018        Recent Labs   Lab Test  05/13/18   0709  05/13/18   0001  05/12/18   1435  05/12/18   0625   05/02/18   0627   WBC  12.0*   --    --   11.5*   --   7.0   HGB  6.9*  7.4*  6.0*  5.7*  Canceled, Test credited   < >  9.0*   HCT  19.8*   --    --   16.8*   --   26.7*   MCV  87   --    --   87   --   88   PLT  116*   --    --   151   --   109*    < > = values in this interval not displayed.     Recent Labs   Lab Test  05/01/18   1715   AST  14   ALT  11   ALKPHOS  74   BILITOTAL  0.3       Recent Labs   Lab Test  05/11/18   0615   MAG  2.0     Recent Labs   Lab Test  05/11/18 0615   PHOS  3.0     Recent Labs   Lab Test  05/13/18   0709  05/12/18   0625  05/11/18   0615   TEMO  7.0*  6.8*  7.4*       Lab Results   Component Value Date    TEMO 7.0 (L) 05/13/2018     Lab Results   Component Value Date    WBC 12.0 (H) 05/13/2018    HGB 6.9 (LL) 05/13/2018    HCT 19.8 (L) 05/13/2018    MCV 87 05/13/2018     (L) 05/13/2018     Lab Results   Component Value Date     05/13/2018    POTASSIUM 4.7 05/13/2018    CHLORIDE 113 (H) 05/13/2018    CO2 21 05/13/2018     (H) 05/13/2018     Lab Results   Component Value Date    BUN 85 (H) 05/13/2018    CR 4.22 (H) 05/13/2018     Lab Results   Component Value Date    MAG 2.0 05/11/2018     Lab Results   Component Value Date    PHOS 3.0 05/11/2018       Creatinine   Date Value Ref Range Status   05/13/2018 4.22 (H) 0.66 - 1.25 mg/dL Final   05/12/2018 3.76 (H) 0.66 - 1.25 mg/dL Final   05/11/2018 3.59 (H) 0.66 - 1.25 mg/dL Final   05/10/2018 3.55 (H) 0.66 - 1.25 mg/dL Final   05/09/2018 3.34 (H) 0.66 - 1.25 mg/dL Final   05/08/2018 3.27 (H) 0.66 -  1.25 mg/dL Final       Attestation:  I have reviewed today's vital signs, notes, medications, labs and imaging.     Kelvin Amaya MD[JT1.1]               Revision History        User Key Date/Time User Provider Type Action    > JT1.2 5/13/2018  1:16 PM Kelvin Amaya MD Physician Sign     JT1.1 5/13/2018  1:04 PM Kelvin Amaya MD Physician             Progress Notes by Alex Alcantar MD at 5/13/2018  9:13 AM     Author:  Alex Alcantar MD Service:  Gastroenterology Author Type:  Physician    Filed:  5/13/2018  9:15 AM Date of Service:  5/13/2018  9:13 AM Creation Time:  5/13/2018  9:13 AM    Status:  Signed :  Alex Alcantar MD (Physician)         Clinically stable.   Hbg stable. Agree with transfusing 1 unit PRBC today.  Advance diet.  Continue on protonix I/V    Alex Alcantar MD  [phone] [id number][AB1.1]     Revision History        User Key Date/Time User Provider Type Action    > AB1.1 5/13/2018  9:15 AM Alex Alcantar MD Physician Sign            Progress Notes by Joey Pizarro MD at 5/13/2018  7:32 AM     Author:  Joey Pizarro MD Service:  Urology Author Type:  Physician    Filed:  5/13/2018  7:34 AM Date of Service:  5/13/2018  7:32 AM Creation Time:  5/13/2018  7:32 AM    Status:  Signed :  Joey Pizarro MD (Physician)         Urology    Catheter was removed yesterday and he has been voiding normally  He has urinary incontinence, which is his baseline    A/P: plan is to leave the catheter out  Please call me if any further questions during this hospitalization, thanks[SB1.1]         Revision History        User Key Date/Time User Provider Type Action    > SB1.1 5/13/2018  7:34 AM Joey Pizarro MD Physician Sign            Progress Notes by Tutu Crooks DO at 5/12/2018  9:05 AM     Author:  Tutu Crooks DO Service:  Hospitalist Author Type:  Physician    Filed:  5/12/2018  2:51 PM  Date of Service:  5/12/2018  9:05 AM Creation Time:  5/12/2018  9:05 AM    Status:  Signed :  Tutu Crooks DO (Physician)         [facility]    Hospitalist Progress Note    Assessment & Plan   Yuriy Ghosh is a 89 year old male[JH1.1] with history including hypertension, 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    Bleeding duodenal ulcer  Acute on chronic blood loss anemia   Near syncope  On the morning of 5/12 the patient had a bowel movement with maroon colored stools and then had a near syncopal episode.  A RRT was called and the patient appeared pale and a CBC was obtained.  His hemoglobin had dropped significantly from admission and was down to 5.7.  Baseline appears to be 9-10 which was where he was on presentation.  Patient received 1 unit of PRBCs on 5/12.  GI was consulted and Dr. Alcantar took the patient for an EGD on 5/12 as well and found a bleeding duodenal ulcer  - GI following and appreciate their recommendations'  - Hgb checks q6hr with conditional units of blood ordered to transfuse if <7  - Protonix 40 mg IV daily starting tomorrow.  Of note, patient received 80 mg Protonix followed by Protonix drip and complained of some throat soreness/burning.  The house staff officer who saw the patient earlier during the RRT re-evaluated the patient and did not feel his tongue was swollen and the patient had no signs of anaphylaxis.  I do not suspect this was an allergic reaction as I found no prior history of receiving Protonix or other PPI making him susceptible to allergic reaction.  Also the patient had no throat tightening, swelling or hives       SARAHI on CKD  Previous Baseline 1.4? (stable in this range 2014 and 2016), Cr 2.0 in CareEverywhere 11/17.  Admission  UA with > 182 RBC (possibly from traumatic cath in the ED) and >300 protein (also note UA 11/2017 with >300 protein and moderate blood).  CT stone negative for CT or hydronephrosis, but noted very large prostate.  Renal US negative for hydronephrosis.  No significant improvement in function following ware placement.  Suspect CKD due to uncontrolled HTN.  Had only been taking Norvasc for BP PTA. No ACE/ARB or diuretic or BB as had been prescribed by PCP. Denies NSAIDs.  - Cr had transiently decreased to 2.9 while on IVF; trending up once IVF stopped  - Cr stable at 3.76  - Nephrology was following and appreciate their recommendations.  Plan to follow-up with patient as an outpatient      BPH   Ware placed 5/3.  Urology recommends continuing ware until seen in f/u with Dr. Pizarro 5/15/18.  PTA Cardura stopped due to his falls and he was placed on Finasteride 5 mg daily this stay.  - Continue ware and cardura     Essential hypertension  Patient was only receiving Norvasc 5 mg/d at home. Was suppose to be on BB, ACE and diuretic but had not been filling these.   - Continue Norvasc 10 mg daily  - Decreased Hydralazine down to 50 mg TID as blood pressures have been soft with the GI bleed  - Avoid BB due to bradycardia and Ace-I/ARB or diuretic due to CKD      Peripheral arterial disease  History of AAA endograft repair about 4 years ago.    - Continue aspirin      Sinus Bradycardia  EKG on admission with HR 40-50s with Mobitz, 1.  Note drop to 40's on 5/2 after beta-blocker was re-introduced, now discontinued.  Echo with EF 45-50%, mild global hypokinesia of LV, dilated LA, 1-2+ MR and TR, Pulm HTN, mod AV stenosis (no prior for comparison). Patient does not appear to be symptomatic but difficult determine with advanced dementia.  SO states patient had not been on Metoprolol PTA.   - No further beta-blockers, rates have been stable      Mechanical Fall    No seizure activity reported. Witnessed by his wife and was  reported that he tripped over the steps outside of his home and no LOC reported. Unable to determine from patient if dizzy prior to fall or any other sx's given dementia. No events other than bradycardia on tele.  TTE as above.  - Therapies recommend TCU      Hypothyroidism  - Continue levothyroxine 125 mcg daily      Seizure disorder  Keppra level <2 on admit.  Likely due to non-compliance as was placed on PTA dose with therapeutic level noted 5/10.    - Continue Keppra 250 mg QHS      Dementia  - Continue PTA memantine 10 mg BID, donepezil 10 mg QHS  - Seroquel 25 every evening and 12.5 mg Bid prn  - Celexa has been discontinued due to prolonged QTc[JH1.2]      # Pain Assessment:  Current Pain Score 5/12/2018   Patient currently in pain? denies   Pain score (0-10) -[JH1.1]   Yuriy knapp pain level was assessed and he currently denies pain.[JH1.2]      DVT Prophylaxis:[JH1.1] Pneumatic Compression Devices[JH1.2]  Code Status: Prior    Disposition: Expected discharge in[JH1.1] 2-3[JH1.2] days once[JH1.1] GI bleed stable.  Still plan to go to memory care unit[JH1.2]    Tutu Crooks, DO  Text Page (7am to 6pm)    Interval History[JH1.1]   Patient seen and examined.  This AM a RRT was called as the patient had a near syncopal episode after having a bowel movement with maroon colored stool.  No chest pain, SOB or fevers.  No other issues.[JH1.2]     -Data reviewed today: I reviewed all new labs and imaging results over the last 24 hours. I personally reviewed[JH1.1] no images or EKG's today[JH1.2].    Physical Exam   Temp: 96.3  F (35.7  C) Temp src: Oral BP: 127/51 Pulse: 58 Heart Rate: 57 Resp: 14 SpO2: 100 % O2 Device: Nasal cannula Oxygen Delivery: 2 LPM  Vitals:    05/01/18 1928 05/03/18 0157 05/12/18 0230   Weight: 61.7 kg (136 lb) 65.8 kg (145 lb) 64.9 kg (143 lb 1.6 oz)     Vital Signs with Ranges  Temp:  [96.3  F (35.7  C)-98.1  F (36.7  C)] 96.3  F (35.7  C)  Pulse:  [58] 58  Heart Rate:  [57-64] 57  Resp:   [14-20] 14  BP: (116-152)/(51-85) 127/51  SpO2:  [95 %-100 %] 100 %  I/O last 3 completed shifts:  In: 990 [P.O.:990]  Out: 825 [Urine:825]    Constitutional: Awake, alert, cooperative, no apparent distress.  Pleasantly demented  Respiratory: Clear to auscultation bilaterally, no crackles or wheezing  Cardiovascular: Bradycardiac.  Distant heart sounds so difficult to assess for murmur  GI: Normal bowel sounds, soft, non-distended, non-tender  Skin/Integumen: No rashes, no cyanosis.  No hives  MSK: No edema     Medications       amLODIPine (NORVASC) tablet 10 mg  10 mg Oral Daily     citalopram (celeXA) tablet 20 mg  20 mg Oral Daily     donepezil (ARICEPT) tablet 10 mg  10 mg Oral At Bedtime     finasteride  5 mg Oral Daily     hydrALAZINE  100 mg Oral 4x Daily     levETIRAcetam  250 mg Oral At Bedtime     levothyroxine (SYNTHROID/LEVOTHROID) tablet 125 mcg  125 mcg Oral QAM AC     memantine  10 mg Oral BID     QUEtiapine  25 mg Oral At Bedtime       Data     Recent Labs  Lab 05/12/18  0625 05/11/18  0615 05/10/18  0659   WBC 11.5*  --   --    HGB 5.7*  Canceled, Test credited  --   --    MCV 87  --   --      --   --    INR 1.26*  --   --     142 143   POTASSIUM 4.5 3.9 4.3   CHLORIDE 113* 112* 112*   CO2 19* 23 24   BUN 76* 58* 50*   CR 3.76* 3.59* 3.55*   ANIONGAP 10 7 7   TEMO 6.8* 7.4* 7.6*   * 105* 93       Imaging:   No results found for this or any previous visit (from the past 24 hour(s)).[JH1.1] 6[JH1.2]     Revision History        User Key Date/Time User Provider Type Action    > JH1.2 5/12/2018  2:51 PM Tutu Crooks, DO Physician Sign     JH1.1 5/12/2018  9:05 AM Tutu Crooks,  Physician             Progress Notes by Alex Alcantar MD at 5/12/2018 12:33 PM     Author:  Alex Alcantar MD Service:  Gastroenterology Author Type:  Physician    Filed:  5/12/2018 12:34 PM Date of Service:  5/12/2018 12:33 PM Creation Time:  5/12/2018 12:33 PM     Status:  Signed :  Alex Alcantar MD (Physician)         EGD: Actively bleeding duodenal Ulcer. Treated With BICAP and Epi.  Clear liquid diet    Alex alacntar MD[AB1.1]     Revision History        User Key Date/Time User Provider Type Action    > AB1.1 5/12/2018 12:34 PM Alex Alcantar MD Physician Sign            Progress Notes by Joey Pizarro MD at 5/12/2018 10:00 AM     Author:  Joey Pizarro MD Service:  Urology Author Type:  Physician    Filed:  5/12/2018 10:04 AM Date of Service:  5/12/2018 10:00 AM Creation Time:  5/12/2018 10:00 AM    Status:  Signed :  Joey Pizarro MD (Physician)         Urology    I was contacted by nurse this AM because patient's ware catheter became clogged, unable to irrigate  The nurse replaced ware with a new 20Fr ware. There was 1 blood clot in the old ware, otherwise urine has been clear and remains clear at this time    A/P: Ware replaced because of patient's upcoming EGD today and anemia  However, creatinine did not improve with ware placement and he was not in retention when I initially placed it, so I do not feel he needs a ware long term  D/C ware once current medical issues resolve, please call if future questions thanks[SB1.1]     Revision History        User Key Date/Time User Provider Type Action    > SB1.1 5/12/2018 10:04 AM Joey Pizarro MD Physician Sign                  Procedure Notes     No notes of this type exist for this encounter.      Progress Notes - Therapies (Notes from 05/12/18 through 05/15/18)     No notes of this type exist for this encounter.

## 2018-05-01 NOTE — ED NOTES
[facility]  ED Nurse Handoff Report    ED Chief complaint: Fall (fell in driveway, possibly hitting head. right elbow skin tear, bump to back of head) and Altered Mental Status (worsening over last few days)      ED Diagnosis:   Final diagnoses:   Acute renal failure, unspecified acute renal failure type (H)   Fall, initial encounter   Dehydration   Altered mental status, unspecified altered mental status type   Generalized muscle weakness       Code Status: not addressed at this time    Allergies: No Known Allergies    Activity level - Baseline/Home:  uses walker    Activity Level - Current:   pt has not been out of bed since arrival to ED     Needed?: No    Isolation: No  Infection: Not Applicable    Bariatric?: No    Vital Signs:   Vitals:    05/01/18 1628   BP: (!) 151/103   Pulse: 52   Resp: 18   Temp: 98.5  F (36.9  C)   TempSrc: Oral   SpO2: 95%       Cardiac Rhythm: ,        Pain level:      Is this patient confused?: Yes, dementia, worsening per wife    Patient Report: Initial Complaint: Fall  Focused Assessment: Pt resides at home with his Wife, Hx dementia, confusion worsening per Wifes report.  Pt had a fall today, hit head but no LOC, small bump noted to right head, has skin tear on right elbow.  Creatinine elevated, will straight cath and send urine.  PIV 18 G right FA.  Bladder scanned for >252.  Tests Performed: CT, xray, labs  Abnormal Results: see results  Treatments provided: Straight cath    Family Comments: Wife at bedside    OBS brochure/video discussed/provided to patient: Yes    ED Medications:   Medications   0.9% sodium chloride BOLUS (1,000 mLs Intravenous New Bag 5/1/18 3256)       Drips infusing?:  No    For the majority of the shift this patient was Green.   Interventions performed were none.    Severe Sepsis OR Septic Shock Diagnosis Present: No      ED NURSE PHONE NUMBER: [phone]

## 2018-05-01 NOTE — ED PROVIDER NOTES
History     Chief Complaint:  Fall and Altered Mental Status      The history is provided by the EMS personnel and the patient. History limited by: dementia.      Yuriy Ghosh is a 89 year old male with Alzheimer's Disease and dementia who presents to the emergency department via EMS for altered mental status and evaluation of injuries after a fall. According to the wife's report to EMS, the patient is confused and forgetful at baseline. However, he has been more confused and weaker over the past few days. Here the patient reports that he stumbled and had a mechanical fall in the driveway, striking the top of his head but not losing consciousness. He also sustained a skin tear to his right elbow, but denies significant pain to the area. He denies any neck pain, headache, nausea, or recent dysuria. The patient reports that he is not on anticoagulation.     Allergies:  Naproxen  Dust mites     Medications:    Fexofenadine  Vitamin B6, Calcium, Vitamin C  Glucosamine-chondroitin   Dulcolax  Aspirin  Ultram  Pravachol  Toprol XL  Aricept  Keppra  Namenda  Synthroid  Lisinopril  Benicar   Cardura   Norvasc   Celexa    Past Medical History:    Alzheimer's Disease   Hypertension  Hypothyroidism  Abdominal aneurysm   Mitral insufficiency   Seizure disorder  Prostate nodule with urinary obstruction  GI bleed  Chronic kidney disease  Aortic stenosis  Peripheral neuropathy  Transient global amnesia  Hyperlipidemia  Benign prostatic hypertrophy   Irritable colon   Diverticulosis     Past Surgical History:    Tonsillectomy, adenoidectomy   Hernia repair  Hemorrhoidectomy   Cholecystectomy   Knee scope  Colonoscopy  AAA repair  Knee replacement  Prostate biopsy    Family History:    Family history reviewed. No pertinent family history.    Social History:  Smoking Status: Former Smoker  Smokeless Tobacco: Never Used  Alcohol Use: Unknown  Marital Status:     Review of Systems   Constitutional:        Fall    Gastrointestinal: Negative for nausea.   Genitourinary: Negative for dysuria.   Musculoskeletal: Negative for neck pain.   Skin: Positive for wound (right elbow).   Neurological: Positive for weakness (generalized). Negative for headaches.   Psychiatric/Behavioral: Positive for confusion.   All other systems reviewed and are negative.    Physical Exam     Patient Vitals for the past 24 hrs:   BP Temp Temp src Pulse Resp SpO2   05/01/18 1628 (!) 151/103 98.5  F (36.9  C) Oral 52 18 95 %       Physical Exam  Nursing note and vitals reviewed.  Constitutional:  Awake and alert, but demented. Cooperative.   HENT:    Contusion to occipital region of scalp.  Nose:    Nose normal.   Mouth/Throat:   Mucous membranes are normal.   Eyes:    Conjunctivae normal and EOM are normal.      Pupils are equal, round, and reactive to light.   Neck:    Trachea normal.   Cardiovascular:  Bradycardic rate, regular rhythm, normal heart sounds and normal pulses.      No murmur heard.  Pulmonary/Chest:  Effort normal and breath sounds normal.   Abdominal:   Soft. Normal appearance and bowel sounds are normal.      There is no tenderness.      There is no rebound and no CVA tenderness.   Musculoskeletal:  Skin tear and ecchymosis to right elbow which is non tender.      No cervical spine tenderness to palpation     Extremities otherwise atraumatic.   Lymphadenopathy:  No cervical adenopathy.   Neurological:   Awake and alert, but demented. Normal strength.      No cranial nerve deficit or sensory deficit. GCS eye subscore is 4.      GCS verbal subscore is 5. GCS motor subscore is 6.   Skin:    Skin is intact. No rash noted.   Psychiatric:   Normal mood and affect.    Emergency Department Course     ECG:  ECG taken at 1659, ECG read at 1707  Sinus rhythm with 2nd degree AV block (Mobitz I)  Nonspecific ST abnormality  Abnormal ECG   Rate 47 bpm. GA interval * ms. QRS duration 98 ms. QT/QTc 472/417 ms. P-R-T axes 68 -5  20.    Imaging:  Radiology findings were communicated with the patient and his wife who voiced understanding of the findings.    CT Head w/o Contrast  1. No evidence of acute intracranial hemorrhage, mass, or herniation.  2. There is generalized atrophy of the brain. White matter changes are  present in the cerebral hemispheres that are consistent with small  vessel ischemic disease in this age patient.   3. Right vertex scalp soft tissue injury without underlying fracture.  Reading per radiology.     XR Elbow Right G/E 3 Views  No acute fracture or dislocation.  Reading per radiology.     Laboratory:  Laboratory findings were communicated with the patient and his wife who voiced understanding of the findings.    CBC: WBC 8.5, HGB 10.1 (L),  (L)  CMP: Chloride 111 (H), Glucose 103 (H), BUN 48 (H), Creatinine 3.38 (H), GFR Estimate 17 (L), Calcium 8.0 (L), Albumin 2.6 (L), Protein Total 5.7 (L) o/w WNL   Troponin (Collected 1715): 0.020  INR: 1.15 (H)     Interventions:  1719 NS Bolus IV     Emergency Department Course:     Nursing notes and vitals reviewed.     I performed an exam of the patient as documented above.     IV was inserted and blood was drawn for laboratory testing, results above.     The patient was sent for an x-ray while in the emergency department, results above.    1800 The patient's wife arrived. I discuss the patient's presentation and plan of care with her.     1822 I discussed the treatment plan with the patient and his wife. They expressed understanding of this plan and consented to admission. I discussed the patient with Dr. Erickson, who will admit the patient to a monitored bed for further evaluation and treatment.    I personally reviewed the laboratory, imaging, and EKG results with the patient and his wife and answered all related questions prior to admission.    Impression & Plan      Medical Decision Making:  Yuriy Ghosh is a 89 year old male who presents to the emergency  department today after having an apparent mechanical fall in his driveway today.  Given his dementia and his age, as well as the contusion to his scalp, I felt it was reasonable and appropriate to obtain a CT scan of his head, which is unremarkable.  I also obtain an x-ray of his right elbow, which also is on a couple.  I proceeded with the above blood work and EKG as well though, given his reported history of increasing weakness and confusion.  He also is unable to urinate for us, and therefore we are bladder scanning him.  He appears to have acute on chronic renal insufficiency today.  This appears quite worse compared to his previous creatinine from last November.  This all could be secondary to dehydration, but I am concerned that he is becoming more dehydrated and at risk of this worsening.  I am also aware he could have a UTI, however again we do not yet have a urine sample.  We are going to now straight cath him for a sample as well as to see how much urine is present in his bladder.  I spoke with Dr. Erickson, who will be taking care of him.    Diagnosis:    ICD-10-CM    1. Acute renal failure, unspecified acute renal failure type (H) N17.9    2. Fall, initial encounter W19.XXXA    3. Dehydration E86.0    4. Altered mental status, unspecified altered mental status type R41.82    5. Generalized muscle weakness M62.81      Disposition:   The patient was admitted into the care of Dr. Erickson for further evaluation and management.    Scribe Disclosure:  I, Rona Arnett, am serving as a scribe at 4:29 PM on 5/1/2018 to document services personally performed by Ty De Luna MD based on my observations and the provider's statements to me.     EMERGENCY DEPARTMENT       Ty De Luna MD  05/01/18 1286

## 2018-05-01 NOTE — IP AVS SNAPSHOT
` [place] [address] ONCOLOGY: [phone]            Medication Administration Report for Yuriy Ghosh as of 05/15/18 1608   Legend:    Given Hold Not Given Due Canceled Entry Other Actions    Time Time (Time) Time  Time-Action       Inactive    Active    Linked        Medications 05/09/18 05/10/18 05/11/18 05/12/18 05/13/18 05/14/18 05/15/18    acetaminophen (TYLENOL) Suppository 650 mg  Dose: 650 mg  Freq: EVERY 4 HOURS PRN Route: RE  PRN Reason: mild pain  Start: 05/01/18 2007   Admin Instructions: Alternate ibuprofen (if ordered) with acetaminophen.  Maximum acetaminophen dose from all sources = 75 mg/kg/day not to exceed 4 grams/day.    Admin. Amount: 1 suppository (1 × 650 mg suppository)  Dispense Loc: [facility]               acetaminophen (TYLENOL) tablet 650 mg  Dose: 650 mg  Freq: EVERY 4 HOURS PRN Route: PO  PRN Reason: mild pain  Start: 05/01/18 2007   Admin Instructions: Alternate ibuprofen (if ordered) with acetaminophen.  Maximum acetaminophen dose from all sources = 75 mg/kg/day not to exceed 4 grams/day.    Admin. Amount: 2 tablet (2 × 325 mg tablet)  Dispense Loc:  [facility] 88B               amLODIPine (NORVASC) tablet 10 mg  Dose: 10 mg  Freq: DAILY Route: PO  Start: 05/04/18 0800   Admin Instructions: Hold for SBP <100    Admin. Amount: 1 tablet (1 × 10 mg tablet)  Last Admin: 05/15/18 0737  Dispense Loc:  [facility] 88B     1039 (10 mg)-Given        1118 (10 mg)-Given        1047 (10 mg)-Given        0800-Hold [C]        0840 (10 mg)-Given        0830 (10 mg)-Given        0737 (10 mg)-Given           citalopram (celeXA) tablet 20 mg  Dose: 20 mg  Freq: DAILY Route: PO  Start: 05/03/18 0930   Admin. Amount: 1 tablet (1 × 20 mg tablet)  Last Admin: 05/15/18 0737  Dispense Loc: [facility] 88[facility]     1039 (20 mg)-Given        1118 (20 mg)-Given        1047 (20 mg)-Given        1039 (20 mg)-Given [C]        0841 (20 mg)-Given        0830 (20 mg)-Given        0737 (20 mg)-Given           donepezil  (ARICEPT) tablet 10 mg  Dose: 10 mg  Freq: AT BEDTIME Route: PO  Start: 05/03/18 2200   Admin. Amount: 1 tablet (1 × 10 mg tablet)  Last Admin: 05/14/18 2128  Dispense Loc:  [facility] 88[facility]     2120 (10 mg)-Given        2133 (10 mg)-Given        2108 (10 mg)-Given        2125 (10 mg)-Given        2102 (10 mg)-Given        2128 (10 mg)-Given        [ ] 2200           finasteride (PROSCAR) tablet 5 mg  Dose: 5 mg  Freq: DAILY Route: PO  Start: 05/03/18 1530   Admin Instructions: *Do not handle tablets if you are pregnant*    Admin. Amount: 1 tablet (1 × 5 mg tablet)  Last Admin: 05/15/18 0737  Dispense Loc:  [facility] 88[facility]     1039 (5 mg)-Given        1118 (5 mg)-Given        1047 (5 mg)-Given        1038 (5 mg)-Given [C]        0840 (5 mg)-Given        0830 (5 mg)-Given        0737 (5 mg)-Given                  hydrALAZINE (APRESOLINE) tablet 50 mg  Dose: 50 mg  Freq: EVERY 8 HOURS SCHEDULED Route: PO  Start: 05/12/18 2200   Admin Instructions: Hold for sbp < 130    Admin. Amount: 1 tablet (1 × 50 mg tablet)  Last Admin: 05/15/18 1338  Dispense Loc:  [facility] 88[facility]        2124 (50 mg)-Given        0603 (50 mg)-Given       1335 (50 mg)-Given       2102 (50 mg)-Given        0559 (50 mg)-Given       1335 (50 mg)-Given       2128 (50 mg)-Given        0630 (50 mg)-Given       1338 (50 mg)-Given       [ ] 2200           levETIRAcetam (KEPPRA) tablet 250 mg  Dose: 250 mg  Freq: AT BEDTIME Route: PO  Start: 05/01/18 2200   Admin. Amount: 1 tablet (1 × 250 mg tablet)  Last Admin: 05/14/18 2128  Dispense Loc:  [facility]     2120 (250 mg)-Given        2133 (250 mg)-Given        2107 (250 mg)-Given        2124 (250 mg)-Given        2102 (250 mg)-Given        2128 (250 mg)-Given        [ ] 2200           levothyroxine (SYNTHROID/LEVOTHROID) tablet 125 mcg  Dose: 125 mcg  Freq: EVERY MORNING BEFORE BREAKFAST Route: PO  Start: 05/03/18 0932   Admin. Amount: 1 tablet (1 × 25 mcg tablet) + 1 tablet (1 × 100 mcg tablet)  Last Admin: 05/15/18  0630  Dispense Loc: [facility] 88[facility]   Mixture Administration Information:   Medication Type Amount   levothyroxine 25 MCG TABS Medications 25 mcg   levothyroxine 100 MCG TABS Medications 100 mcg             0622 (125 mcg)-Given               0627 (125 mcg)-Given               0653 (125 mcg)-Given        1040 (125 mcg)-Given [C]        0603 (125 mcg)-Given               0559 (125 mcg)-Given               0630 (125 mcg)-Given           May continue current IV fluids if patient has IV fluids infusing.  Freq: CONTINUOUS PRN Route: XX  Start: 05/12/18 1333   Dispense Loc: [facility] Floor Stock  POC: Post-procedure               melatonin tablet 1 mg  Dose: 1 mg  Freq: AT BEDTIME PRN Route: PO  PRN Reason: sleep  Start: 05/01/18 2007   Admin Instructions: Do not give unless at least 6 hours of uninterrupted sleep is expected.    Admin. Amount: 1 tablet (1 × 1 mg tablet)  Last Admin: 05/09/18 0116  Dispense Loc: [address]     0116 (1 mg)-Given                 memantine (NAMENDA) tablet 10 mg  Dose: 10 mg  Freq: 2 TIMES DAILY Route: PO  Start: 05/09/18 1000   Admin. Amount: 1 tablet (1 × 10 mg tablet)  Last Admin: 05/15/18 1019  Dispense Loc: [place] [id number]     1039 (10 mg)-Given       2120 (10 mg)-Given        1118 (10 mg)-Given       1928 (10 mg)-Given        1047 (10 mg)-Given       2108 (10 mg)-Given        1041 (10 mg)-Given [C]       2125 (10 mg)-Given        0841 (10 mg)-Given              2102 (10 mg)-Given        0945 (10 mg)-Given       2002 (10 mg)-Given        1019 (10 mg)-Given       [ ] 2000           naloxone (NARCAN) injection 0.1-0.4 mg  Dose: 0.1-0.4 mg  Freq: EVERY 2 MIN PRN Route: IV  PRN Reason: opioid reversal  Start: 05/01/18 2007   Admin Instructions: For respiratory rate LESS than or EQUAL to 8.  Partial reversal dose:  0.1 mg titrated q 2 minutes for Analgesia Side Effects Monitoring Sedation Level of 3 (frequently drowsy, arousable, drifts to sleep during conversation).Full reversal dose:  0.4 mg bolus for  Analgesia Side Effects Monitoring Sedation Level of 4 (somnolent, minimal or no response to stimulation).  For ordered doses up to 2mg give IVP. Give each 0.4mg over 15 seconds in emergency situations. For non-emergent situations further dilute in 9mL of NS to facilitate titration of response.    Admin. Amount: 0.1-0.4 mg = 0.25-1 mL Conc: 0.4 mg/mL  Dispense Loc:  [facility]  Volume: 1 mL               ondansetron (ZOFRAN-ODT) ODT tab 4 mg  Dose: 4 mg  Freq: EVERY 6 HOURS PRN Route: PO  PRN Reasons: nausea,vomiting  Start: 05/01/18 2007   Admin Instructions: This is Step 1 of nausea and vomiting management.  If nausea not resolved in 15 minutes, go to Step 2 prochlorperazine (COMPAZINE). Do not push through foil backing. Peel back foil and gently remove. Place on tongue immediately. Administration with liquid unnecessary  With dry hands, peel back foil backing and gently remove tablet; do not push oral disintegrating tablet through foil backing; administer immediately on tongue and oral disintegrating tablet dissolves in seconds; then swallow with saliva; liquid not required.    Admin. Amount: 1 tablet (1 × 4 mg tablet)  Dispense Loc:  [facility]              Or  ondansetron (ZOFRAN) injection 4 mg  Dose: 4 mg  Freq: EVERY 6 HOURS PRN Route: IV  PRN Reasons: nausea,vomiting  Start: 05/01/18 2007   Admin Instructions: This is Step 1 of nausea and vomiting management.  If nausea not resolved in 15 minutes, go to Step 2 prochlorperazine (COMPAZINE).  Irritant. For ordered doses up to 4 mg, give IV Push undiluted over 2-5 minutes.    Admin. Amount: 4 mg = 2 mL Conc: 4 mg/2 mL  Dispense Loc:  [facility]  Infused Over: 2-5 Minutes  Volume: 2 mL               opium-belladonna (B&O SUPPRETTES) 30-16.2 MG per suppository 1 suppository  Dose: 30 mg  Freq: EVERY 8 HOURS PRN Route: RE  PRN Reason: moderate pain  Start: 05/03/18 1849   Admin. Amount: 1 suppository  Last Admin: 05/12/18 1425  Dispense Loc:  [facility]        1425 (1  suppository)-Given              pantoprazole (PROTONIX) EC tablet 40 mg  Dose: 40 mg  Freq: EVERY MORNING Route: PO  Start: 05/15/18 0900   Admin Instructions: DO NOT CRUSH.    Admin. Amount: 1 tablet (1 × 40 mg tablet)  Last Admin: 05/15/18 0738  Dispense Loc: [facility]           0738 (40 mg)-Given                  phenol (CHLORASEPTIC) 1.4 % spray 1 mL  Dose: 1 spray  Freq: EVERY 1 HOUR PRN Route: MT  PRN Reason: sore throat  Start: 05/12/18 0919   Admin. Amount: 1 mL = 1 spray  Dispense Loc:  [facility] [address]  Volume: 177 mL               polyethylene glycol (MIRALAX/GLYCOLAX) Packet 17 g  Dose: 17 g  Freq: DAILY PRN Route: PO  PRN Reason: constipation  Start: 05/01/18 2007   Admin Instructions: Give in 8oz of  water, juice, or soda. Hold for loose stools.  This is the second step of a three step constipation treatment protocol.  1 Packet = 17 grams. Mixed prescribed dose in 8 ounces of water. Follow with 8 oz. of water.    Admin. Amount: 17 g  Dispense Loc:  [facility]               prochlorperazine (COMPAZINE) injection 5 mg  Dose: 5 mg  Freq: EVERY 6 HOURS PRN Route: IV  PRN Reasons: nausea,vomiting  Start: 05/01/18 2007   Admin Instructions: This is Step 2 of nausea and vomiting management. Give if nausea not resolved 15 minutes after giving ondansetron (ZOFRAN). If nausea not resolved in 15 minutes, go to Step 3 metoclopramide (REGLAN), if ordered.  For ordered doses up to 10 mg, give IV Push undiluted. Each 5mg over 1 minute.    Admin. Amount: 5 mg = 1 mL Conc: 5 mg/mL  Dispense Loc:  [facility]  Infused Over: 1-2 Minutes  Volume: 1 mL              Or  prochlorperazine (COMPAZINE) tablet 5 mg  Dose: 5 mg  Freq: EVERY 6 HOURS PRN Route: PO  PRN Reason: vomiting  Start: 05/01/18 2007   Admin Instructions: This is Step 2 of nausea and vomiting management. Give if nausea not resolved 15 minutes after giving ondansetron (ZOFRAN). If nausea not resolved in 15 minutes, go to Step 3 metoclopramide (REGLAN), if  ordered.    Admin. Amount: 1 tablet (1 × 5 mg tablet)  Dispense Loc:  [facility]              Or  prochlorperazine (COMPAZINE) Suppository 12.5 mg  Dose: 12.5 mg  Freq: EVERY 12 HOURS PRN Route: RE  PRN Reasons: nausea,vomiting  Start: 05/01/18 2007   Admin Instructions: This is Step 2 of nausea and vomiting management. Give if nausea not resolved 15 minutes after giving ondansetron (ZOFRAN). If nausea not resolved in 15 minutes, go to Step 3 metoclopramide (REGLAN), if ordered.    Admin. Amount: 0.5 suppository (0.5 × 25 mg suppository)  Dispense Loc:  Main Pharmacy               QUEtiapine (SEROquel) half-tab 12.5 mg  Dose: 12.5 mg  Freq: 3 TIMES DAILY PRN Route: PO  PRN Comment: restlessness/agitation  Start: 05/11/18 0056   Admin. Amount: 1 half-tab (1 × 12.5 mg half-tab)  Last Admin: 05/11/18 0123  Dispense Loc: [facility] 88[facility]       0123 (12.5 mg)-Given               QUEtiapine (SEROquel) tablet 25 mg  Dose: 25 mg  Freq: AT BEDTIME Route: PO  Start: 05/10/18 1900   Admin. Amount: 1 tablet (1 × 25 mg tablet)  Last Admin: 05/14/18 2002  Dispense Loc: [facility] 88[facility]      1928 (25 mg)-Given        2108 (25 mg)-Given        2124 (25 mg)-Given        2102 (25 mg)-Given        2002 (25 mg)-Given        [ ] 2100           sodium chloride (PF) 0.9% PF flush 3 mL  Dose: 3 mL  Freq: EVERY 1 MIN PRN Route: IV  PRN Reason: line flush  PRN Comment: after medication administration. For peripheral IV flush post IV meds  Start: 05/12/18 1333   Admin. Amount: 3 mL  Dispense Loc: [facility] Floor Stock  Volume: 3 mL  POC: Post-procedure              Discontinued Medications  Medications 05/09/18 05/10/18 05/11/18 05/12/18 05/13/18 05/14/18 05/15/18         Dose: 0.2 mg  Freq: EVERY 1 MIN PRN Route: IV  PRN Reason: benzodiazepine reversal  PRN Comment: over sedation  Start: 05/12/18 1333   End: 05/13/18 0132   Admin Instructions: Give over 15 seconds. If inadequate response after 45 seconds, may repeat up to a MAX total dose of 1 mg)  Irritant.  For ordered doses up to 1 mg, give IV Push undiluted. Administer each 0.2mg over 15 seconds.    Admin. Amount: 0.2 mg = 2 mL Conc: 0.1 mg/mL  Dispense Loc:  [facility]  Infused Over: 1 Minutes  Volume: 5 mL  POC: Post-procedure         0132-Med Discontinued           Dose: 0.1-0.4 mg  Freq: EVERY 2 MIN PRN Route: IV  PRN Reason: opioid reversal  Start: 05/12/18 1333   End: 05/13/18 1332   Admin Instructions: For apnea or imminent respiratory arrest: give 0.4 mg IV undiluted Q 2 minutes PRN until desired degree of reversal is obtained, stop opioid and notify provider. Continue monitoring until discharge criteria are met for a minimum of 2 hours.  For severe sedation, decrease in respiratory depth, quality or respiratory rate less than 8: give 0.1 mg IV Q 2 minutes x 3 doses, stop opioid and notify provider.  Try to minimize reversal of analgesia especially in end-of-life patients  For ordered doses up to 2mg give IVP. Give each 0.4mg over 15 seconds in emergency situations. For non-emergent situations further dilute in 9mL of NS to facilitate titration of response.    Admin. Amount: 0.1-0.4 mg = 0.25-1 mL Conc: 0.4 mg/mL  Dispense Loc:  [facility]  Volume: 1 mL  POC: Post-procedure         1332-Med Discontinued           Dose: 40 mg  Freq: DAILY WITH BREAKFAST Route: IV  Start: 05/13/18 0800   End: 05/15/18 0728   Admin Instructions: Irritant. For ordered doses up to 40 mg, give IV Push over 2 minutes when reconstituted with 10mL NS.    Admin. Amount: 40 mg  Last Admin: 05/14/18 0830  Dispense Loc:  [facility]  Volume: 10 mL         0840 (40 mg)-Given        0830 (40 mg)-Given        0728-Med Discontinued         Rate: 100 mL/hr   Freq: CONTINUOUS Route: IV  Start: 05/13/18 1330   End: 05/14/18 0429   Last Admin: 05/14/18 0046  Dispense Loc: [facility] Floor Stock  Volume: 1,000 mL         1509 ( )-New Bag        0046 ( )-New Bag       0429-Med Discontinued

## 2018-05-01 NOTE — IP AVS SNAPSHOT
"` `           [place] [address] ONCOLOGY: [phone]                                              INTERAGENCY TRANSFER FORM - NURSING   2018                    Hospital Admission Date: 2018  SHY BELLA   : 1926  Sex: Male        Attending Provider: Kelvin Humphrey MD     Allergies:  No Active Allergies    Infection:  None   Service:  HOSPITALIST    Ht:  1.702 m (5' 7\")   Wt:  64.9 kg (143 lb 1.6 oz)   Admission Wt:  61.7 kg (136 lb)    BMI:  22.41 kg/m 2   BSA:  1.75 m 2            Patient PCP Information     Provider PCP Type    Kal Guadalupe MD General      Current Code Status     Date Active Code Status Order ID Comments User Context       Prior      Code Status History     Date Active Date Inactive Code Status Order ID Comments User Context    2018  5:45 PM  DNR/DNI [id number]  Kike Knight MD Outpatient    2018  8:07 PM 2018  5:45 PM DNR/DNI [id number]  Shakir Erickson MD Inpatient      Advance Directives        Scanned docmt in ACP Activity?           No scanned doc        Hospital Problems as of 5/15/2018              Priority Class Noted POA    Fall Medium  2018 Yes    Acute renal failure (ARF) (H) Medium  5/3/2018 Yes      Non-Hospital Problems as of 5/15/2018              Priority Class Noted    ACP (advance care planning) Medium  2018      Immunizations     None         END      ASSESSMENT     Discharge Profile Flowsheet     EXPECTED DISCHARGE     Patient's communication style  spoken language (English or Bilingual) 18 1623    Expected Discharge Date  18 ([facility]) 18 0916   SKIN      DISCHARGE NEEDS ASSESSMENT     Inspection of bony prominences  Full 05/15/18 0914    Equipment Currently Used at Home  walker, rolling 18 0946   Inspection under devices  Full 18 8448    # of Referrals Placed by CTS  External Care Coordination;Scheduled Follow-up appointments;Communication hand-offs to next level of Care " "Providers 05/15/18 1203   Skin WDL  ex 05/15/18 0914    New Steerage to  Clinics?  No 05/15/18 1203   Skin Moisture  dry;flaky 05/15/18 0914    [facility]  05/15/18 1203   Skin Integrity  abrasion(s);other (see comments) (right knee; penis) 05/15/18 0914    Primary Care MD Name  Dr. Guadalupe 05/15/18 1203   Skin Color/Characteristics  pale 05/15/18 0914    GASTROINTESTINAL (ADULT,PEDIATRIC,OB)     Skin Temperature  warm 05/14/18 2353    GI WDL  WDL 05/15/18 0914   Skin Elasticity  slow return to original state 05/14/18 2353    All Quadrants Bowel Sounds  audible and active in all quadrants 05/14/18 2353   Full except areas not inspected   Coccyx;Sacrum;Buttock, right;Buttock, left 05/14/18 2353    Last Bowel Movement  05/12/18 05/15/18 0914   SAFETY      GI Signs/Symptoms  -- (Melena) 05/13/18 0032   Safety WDL  WDL 05/15/18 0914    Passing flatus  yes 05/14/18 2353   All Alarms  alarm(s) activated and audible 05/15/18 0914    COMMUNICATION ASSESSMENT     Safety Factors  ID band on;call light in reach (long arm) 05/14/18 1631                 Assessment WDL (Within Defined Limits) Definitions           Safety WDL     Effective: 09/28/15    Row Information: <b>WDL Definition:</b> Bed in low position, wheels locked; call light in reach; upper side rails up x 2; ID band on<br> <font color=\"gray\"><i>Item=AS safety wdl>>List=AS safety wdl>>Version=F14</i></font>      Skin WDL     Effective: 09/28/15    Row Information: <b>WDL Definition:</b> Warm; dry; intact; elastic; without discoloration; pressure points without redness<br> <font color=\"gray\"><i>Item=AS skin wdl>>List=AS skin wdl>>Version=F14</i></font>      Vitals     Vital Signs Flowsheet     VITAL SIGNS     Side Effects Monitoring: Respiratory Depth  N 05/14/18 0949    Temp  95.9  F (35.5  C) 05/15/18 1207   Side Effects Monitoring: Sedation Level  1 05/14/18 0949    Temp src  Oral 05/15/18 1207   HEIGHT AND WEIGHT      Resp  16 " "05/15/18 1207   Height  1.702 m (5' 7\") 05/01/18 1931    Pulse  54 05/13/18 1305   Height Method  Stated 05/01/18 1931    Heart Rate  62 05/15/18 1207   Weight  64.9 kg (143 lb 1.6 oz) 05/12/18 0231    Pulse/Heart Rate Source  Monitor 05/15/18 1207   Weight Method  Standing scale 05/12/18 0231    BP  156/66 05/15/18 1207   BSA (Calculated - sq m)  1.71 05/01/18 1931    BP Location  Right arm 05/15/18 1207   BMI (Calculated)  21.35 05/01/18 1931    Patient Position  Lying 05/12/18 1155   DAILY CARE      OXYGEN THERAPY     Activity Management  activity encouraged;activity adjusted per tolerance;up in chair 05/15/18 0914    SpO2  96 % 05/15/18 1207   Activity Assistance Provided  assistance, 2 people 05/14/18 2133    O2 Device  None (Room air) 05/15/18 1207   Assistive Device Utilized  gait belt;walker 05/15/18 0914    FiO2 (%)  -- 05/14/18 0908   Additional Documentation  Activity Device Assistance (Row) 05/07/18 2338    Oxygen Delivery  2 LPM 05/12/18 0736   Symptoms Noted During/After Activity  other (see comments) (wobbling his legs.) 05/14/18 2133    PAIN/COMFORT     POSITIONING      Patient Currently in Pain  denies 05/15/18 0903   Body Position  independently positioning 05/15/18 0914    Preferred Pain Scale  number (Numeric Rating Pain Scale) 05/07/18 1453   Head of Bed (HOB)  HOB at 20-30 degrees 05/14/18 2346    Patient's Stated Pain Goal  No pain 05/07/18 1453   Positioning/Transfer Devices  pillows;in use 05/14/18 2346    0-10 Pain Scale  0 05/07/18 1453   Chair  -- 05/12/18 1226    Response to Interventions  Absence of nonverbal indicators of pain 05/12/18 0242   ECG      ANALGESIA SIDE EFFECTS MONITORING     ECG Rhythm  Atrial fibrillation 05/12/18 1238    Side Effects Monitoring: Respiratory Quality  R 05/14/18 0949   Ectopy  None 05/12/18 1155            Patient Lines/Drains/Airways Status    Active LINES/DRAINS/AIRWAYS     Name: Placement date: Placement time: Site: Days: Last dressing change:    Wound " 05/02/18 Posterior Elbow Skin tear dime size skin tear from fall 05/02/18   1959   Elbow   12     Wound 05/03/18 Posterior Penis Ulceration;Other (comment) Lesion to underside of penis, cauliflower texture 05/03/18   1530   Penis   11             Patient Lines/Drains/Airways Status    Active PICC/CVC     None            Intake/Output Detail Report     Date Intake       Output   Net    Shift P.O. I.V. IV Piggyback Blood Components Total Urine Stool Total       Noc 05/13/18 2300 - 05/14/18 0659 -- -- -- -- -- -- -- -- 0    Day 05/14/18 0700 - 05/14/18 1459 340 -- -- -- 340 -- -- -- 340    Sharifa 05/14/18 1500 - 05/14/18 2259 -- -- -- -- -- -- -- -- 0    Noc 05/14/18 2300 - 05/15/18 0659 -- -- -- -- -- -- -- -- 0    Day 05/15/18 0700 - 05/15/18 1459 -- -- -- -- -- -- -- -- 0      Last Void/BM       Most Recent Value    Urine Occurrence 1 at 05/15/2018 1130    Stool Occurrence 1 at 05/15/2018 0100      Case Management/Discharge Planning     Case Management/Discharge Planning Flowsheet     REFERRAL INFORMATION     ASSESSMENT OF FAMILY/SOCIAL SUPPORT      Did the Initial Social Work Assessment result in a Social Work Case?  No 05/15/18 1203   Marital Status  Lives with Significant Other 05/03/18 1101    Admission Type  inpatient 05/15/18 1203   Description of Support System  Supportive;Involved 05/03/18 1101    Arrived From  admitted as an inpatient 05/15/18 1203   COPING/STRESS      Referral Source  physician 05/15/18 1203   Major Change/Loss/Stressor  none 05/03/18 2130    New Steerage to  Clinics?  No 05/15/18 1203   EXPECTED DISCHARGE      # of Referrals Placed by [facility]  External Care Coordination;Scheduled Follow-up appointments;Communication hand-offs to next level of Care Providers 05/15/18 1203   Expected Discharge Date  05/16/18 ([facility]) 05/14/18 0916    Reason For Consult  discharge planning 05/15/18 1203   FINAL RESOURCES      Record Reviewed  clinical discipline documentation;history and physical;medical  record 05/15/18 1203   Equipment Currently Used at Home  walker, rolling 05/03/18 0946    CTS Assigned to Case  Olga Dobbins 05/15/18 1203   ABUSE RISK SCREEN      [facility]  05/15/18 1203   QUESTION TO PATIENT:  Has a member of your family or a partner(now or in the past) intimidated, hurt, manipulated, or controlled you in any way?  no 05/01/18 1626    Primary Care MD Name  Dr. Guadalupe 05/15/18 1203   QUESTION TO PATIENT: Do you feel safe going back to the place where you are living?  yes 05/01/18 1626    LIVING ENVIRONMENT     OBSERVATION: Is there reason to believe there has been maltreatment of a vulnerable adult (ie. Physical/Sexual/Emotional abuse, self neglect, lack of adequate food, shelter, medical care, or financial exploitation)?  no 05/01/18 1626    Lives With  significant other 05/03/18 1101   OTHER      Living Arrangements  house 05/03/18 1101   Are you depressed or being treated for depression?  No 05/1935    HOME SAFETY     HOMICIDE RISK      Patient Feels Safe Living in Home?  yes 05/03/18 1101   Feels Like Hurting Others  no 05/01/18 1626

## 2018-05-01 NOTE — IP AVS SNAPSHOT
[address], [address]    [facility] [address]    Phone:  [phone]                                       After Visit Summary   5/1/2018    Yuriy Ghosh    MRN: [id number]           After Visit Summary Signature Page     I have received my discharge instructions, and my questions have been answered. I have discussed any challenges I see with this plan with the nurse or doctor.    ..........................................................................................................................................  Patient/Patient Representative Signature      ..........................................................................................................................................  Patient Representative Print Name and Relationship to Patient    ..................................................               ................................................  Date                                            Time    ..........................................................................................................................................  Reviewed by Signature/Title    ...................................................              ..............................................  Date                                                            Time

## 2018-05-01 NOTE — IP AVS SNAPSHOT
"    [place] [address] ONCOLOGY: [phone]                                              INTERAGENCY TRANSFER FORM - LAB / IMAGING / EKG / EMG RESULTS   2018                    Hospital Admission Date: 2018  SHY BELLA   : 1926  Sex: Male        Attending Provider: Kelvin Humphrey MD     Allergies:  No Active Allergies    Infection:  None   Service:  HOSPITALIST    Ht:  1.702 m (5' 7\")   Wt:  64.9 kg (143 lb 1.6 oz)   Admission Wt:  61.7 kg (136 lb)    BMI:  22.41 kg/m 2   BSA:  1.75 m 2            Patient PCP Information     Provider PCP Type    Kal Guadalupe MD General         Lab Results - 3 Days      Basic metabolic panel [[id number]] (Abnormal)  Resulted: 05/15/18 0806, Result status: Final result    Ordering provider: SHYLA Pemberton MD  05/15/18 0000 Resulting lab: [facility]    Specimen Information    Type Source Collected On   Blood  05/15/18 0725          Components       Value Reference Range Flag Lab   Sodium 141 133 - 144 mmol/L  FrStHsLb   Potassium 4.1 3.4 - 5.3 mmol/L  FrStHsLb   Chloride 112 94 - 109 mmol/L H FrStHsLb   Carbon Dioxide 21 20 - 32 mmol/L  FrStHsLb   Anion Gap 8 3 - 14 mmol/L  FrStHsLb   Glucose 89 70 - 99 mg/dL  FrStHsLb   Urea Nitrogen 68 7 - 30 mg/dL H FrStHsLb   Creatinine 3.98 0.66 - 1.25 mg/dL H FrStHsLb   GFR Estimate 14 >60 mL/min/1.7m2 L FrStHsLb   Comment:  Non  GFR Calc   GFR Estimate If Black 17 >60 mL/min/1.7m2 L FrStHsLb   Comment:  African American GFR Calc   Calcium 7.3 8.5 - 10.1 mg/dL L FrStHsLb            Hemoglobin [[id number]] (Abnormal)  Resulted: 05/15/18 0745, Result status: Final result    Ordering provider: Tutu Crooks DO  05/15/18 0000 Resulting lab: [facility]    Specimen Information    Type Source Collected On   Blood  05/15/18 0725          Components       Value Reference Range Flag Lab   Hemoglobin 7.8 13.3 - 17.7 g/dL L FrStHsLb            Urine " Culture Aerobic Bacterial [[id number]]  Resulted: 05/14/18 2116, Result status: Final result    Ordering provider: Tutu Crooks,   05/13/18 1656 Resulting lab: [facility]    Specimen Information    Type Source Collected On   Midstream Urine  05/13/18 1625          Components       Value Reference Range Flag Lab   Specimen Description Midstream Urine      Special Requests Specimen received in preservative   75   Culture Micro --   225   Result:         10,000 to 50,000 colonies/mL  mixed urogenital michael  Susceptibility testing not routinely done              Basic metabolic panel [[id number]] (Abnormal)  Resulted: 05/14/18 0738, Result status: Final result    Ordering provider: Kelvin Amaya MD  05/14/18 0000 Resulting lab: [facility]    Specimen Information    Type Source Collected On   Blood  05/14/18 0700          Components       Value Reference Range Flag Lab   Sodium 142 133 - 144 mmol/L  FrStHsLb   Potassium 4.2 3.4 - 5.3 mmol/L  FrStHsLb   Chloride 114 94 - 109 mmol/L H FrStHsLb   Carbon Dioxide 20 20 - 32 mmol/L  FrStHsLb   Anion Gap 8 3 - 14 mmol/L  FrStHsLb   Glucose 88 70 - 99 mg/dL  FrStHsLb   Urea Nitrogen 74 7 - 30 mg/dL H FrStHsLb   Creatinine 3.90 0.66 - 1.25 mg/dL H FrStHsLb   GFR Estimate 15 >60 mL/min/1.7m2 L FrStHsLb   Comment:  Non  GFR Calc   GFR Estimate If Black 18 >60 mL/min/1.7m2 L FrStHsLb   Comment:  African American GFR Calc   Calcium 7.0 8.5 - 10.1 mg/dL L FrStHsLb            Phosphorus [[id number]]  Resulted: 05/14/18 0738, Result status: Final result    Ordering provider: Kelvin Amaya MD  05/14/18 0000 Resulting lab: [facility]    Specimen Information    Type Source Collected On   Blood  05/14/18 0700          Components       Value Reference Range Flag Lab   Phosphorus 3.6 2.5 - 4.5 mg/dL  FrStHsLb            Magnesium [[id number]]  Resulted: 05/14/18 0738, Result status:  Final result    Ordering provider: Kelvin Amaya MD  05/14/18 0000 Resulting lab: [facility]    Specimen Information    Type Source Collected On   Blood  05/14/18 0700          Components       Value Reference Range Flag Lab   Magnesium 2.0 1.6 - 2.3 mg/dL  FrStHsLb            Hemoglobin [[id number]] (Abnormal)  Resulted: 05/14/18 0726, Result status: Final result    Ordering provider: Tutu Crooks DO  05/14/18 0000 Resulting lab: [facility]    Specimen Information    Type Source Collected On   Blood  05/14/18 0700          Components       Value Reference Range Flag Lab   Hemoglobin 7.5 13.3 - 17.7 g/dL L FrStHsLb            Hemoglobin [[id number]] (Abnormal)  Resulted: 05/14/18 0012, Result status: Final result    Ordering provider: Tutu Crooks DO  05/13/18 1800 Resulting lab: [facility]    Specimen Information    Type Source Collected On   Blood  05/13/18 2355          Components       Value Reference Range Flag Lab   Hemoglobin 7.5 13.3 - 17.7 g/dL L FrStHsLb            Hemoglobin [[id number]] (Abnormal)  Resulted: 05/13/18 1828, Result status: Final result    Ordering provider: Tutu Crooks DO  05/13/18 1326 Resulting lab: [facility]    Specimen Information    Type Source Collected On   Blood  05/13/18 1820          Components       Value Reference Range Flag Lab   Hemoglobin 7.9 13.3 - 17.7 g/dL L FrStHsLb            Protein  random urine with Creat Ratio [[id number]] (Abnormal)  Resulted: 05/13/18 1652, Result status: Final result    Ordering provider: Kelvin Amaya MD  05/13/18 1317 Resulting lab: [facility]    Specimen Information    Type Source Collected On   Urine Urine clean catch 05/13/18 1625          Components       Value Reference Range Flag Lab   Protein Random Urine 2.31 g/L  FrStHsLb   Protein Total Urine g/gr Creatinine 3.82 0 - 0.2 g/g Cr H FrStHsLb             Creatinine urine calculation only [[id number]]  Resulted: 05/13/18 1652, Result status: Final result    Ordering provider: Kelvin Amaya MD  05/13/18 1625 Resulting lab: [facility]    Specimen Information    Type Source Collected On     05/13/18 1625          Components       Value Reference Range Flag Lab   Creatinine Urine 60 mg/dL  FrStHsLb            UA with Microscopic [[id number]] (Abnormal)  Resulted: 05/13/18 1639, Result status: Final result    Ordering provider: Kelvin Humphrey MD  05/13/18 1625 Resulting lab: [facility]    Specimen Information    Type Source Collected On   Midstream Urine  05/13/18 1625          Components       Value Reference Range Flag Lab   Color Urine Straw   FrStHsLb   Appearance Urine Slightly Cloudy   FrStHsLb   Glucose Urine 30 NEG^Negative mg/dL A FrStHsLb   Bilirubin Urine Negative NEG^Negative  FrStHsLb   Ketones Urine Negative NEG^Negative mg/dL  FrStHsLb   Specific Nettie Urine 1.010 1.003 - 1.035  FrStHsLb   Blood Urine Trace NEG^Negative A FrStHsLb   pH Urine 5.5 5.0 - 7.0 pH  FrStHsLb   Protein Albumin Urine 100 NEG^Negative mg/dL A FrStHsLb   Urobilinogen mg/dL Normal 0.0 - 2.0 mg/dL  FrStHsLb   Nitrite Urine Negative NEG^Negative  FrStHsLb   Leukocyte Esterase Urine Moderate NEG^Negative A FrStHsLb   Source Midstream Urine   FrStHsLb   WBC Urine 41 0 - 5 /HPF H FrStHsLb   RBC Urine 17 0 - 2 /HPF H FrStHsLb   Bacteria Urine Few NEG^Negative /HPF A FrStHsLb   Squamous Epithelial /HPF Urine 1 0 - 1 /HPF  FrStHsLb   Mucous Urine Present NEG^Negative /LPF A FrStHsLb            Hemoglobin [[id number]] (Abnormal)  Resulted: 05/13/18 1415, Result status: Final result    Ordering provider: Tutu Crooks DO  05/13/18 1302 Resulting lab: [facility]    Specimen Information    Type Source Collected On   Blood  05/13/18 1405          Components       Value Reference Range Flag Lab   Hemoglobin 7.9 13.3  - 17.7 g/dL L FrStHsLb            CBC with platelets [[id number]] (Abnormal)  Resulted: 05/13/18 0750, Result status: Final result    Ordering provider: Tutu Crooks,   05/13/18 0330 Resulting lab: [facility]    Specimen Information    Type Source Collected On   Blood  05/13/18 0709          Components       Value Reference Range Flag Lab   WBC 12.0 4.0 - 11.0 10e9/L H FrStHsLb   RBC Count 2.27 4.4 - 5.9 10e12/L L FrStHsLb   Hemoglobin 6.9 13.3 - 17.7 g/dL LL FrStHsLb   Comment:         This result has been called to RYAN ESCOBAR ON [address] by Mariann MENESES on 05 13 2018   at 0748, and has been read back.      Hematocrit 19.8 40.0 - 53.0 % L FrStHsLb   MCV 87 78 - 100 fl  FrStHsLb   MCH 30.4 26.5 - 33.0 pg  FrStHsLb   MCHC 34.8 31.5 - 36.5 g/dL  FrStHsLb   RDW 15.1 10.0 - 15.0 % H FrStHsLb   Platelet Count 116 150 - 450 10e9/L L FrStHsLb            Basic metabolic panel [[id number]] (Abnormal)  Resulted: 05/13/18 0743, Result status: Final result    Ordering provider: Tutu Crooks,   05/13/18 0330 Resulting lab: [facility]    Specimen Information    Type Source Collected On   Blood  05/13/18 0709          Components       Value Reference Range Flag Lab   Sodium 142 133 - 144 mmol/L  FrStHsLb   Potassium 4.7 3.4 - 5.3 mmol/L  FrStHsLb   Chloride 113 94 - 109 mmol/L H FrStHsLb   Carbon Dioxide 21 20 - 32 mmol/L  FrStHsLb   Anion Gap 8 3 - 14 mmol/L  FrStHsLb   Glucose 104 70 - 99 mg/dL H FrStHsLb   Urea Nitrogen 85 7 - 30 mg/dL H FrStHsLb   Creatinine 4.22 0.66 - 1.25 mg/dL H FrStHsLb   GFR Estimate 13 >60 mL/min/1.7m2 L FrStHsLb   Comment:  Non  GFR Calc   GFR Estimate If Black 16 >60 mL/min/1.7m2 L FrStHsLb   Comment:  African American GFR Calc   Calcium 7.0 8.5 - 10.1 mg/dL L FrStHsLb            Hemoglobin [[id number]] (Abnormal)  Resulted: 05/13/18 0017, Result status: Final result    Ordering provider: Diana Faith APRN CNP  05/12/18 1800  Resulting lab: [facility]    Specimen Information    Type Source Collected On   Blood  05/13/18 0001          Components       Value Reference Range Flag Lab   Hemoglobin 7.4 13.3 - 17.7 g/dL L FrStHsLb            Hemoglobin [[id number]] (Abnormal)  Resulted: 05/12/18 1504, Result status: Final result    Ordering provider: Tutu Crooks,   05/12/18 1405 Resulting lab: [facility]    Specimen Information    Type Source Collected On   Blood  05/12/18 1435          Components       Value Reference Range Flag Lab   Hemoglobin 6.0 13.3 - 17.7 g/dL LL FrStHsLb   Comment:         This result has been called to LISA CHONG by Evelyn VIDALES on 05 12 2018 at   1501, and has been read back.               INR [[id number]] (Abnormal)  Resulted: 05/12/18 0805, Result status: Final result    Ordering provider: Diana Faith APRN CNP  05/12/18 0716 Resulting lab: [facility]    Specimen Information    Type Source Collected On   Blood  05/12/18 0625          Components       Value Reference Range Flag Lab   INR 1.26 0.86 - 1.14 H FrStHsLb            CBC with platelets [[id number]] (Abnormal)  Resulted: 05/12/18 0756, Result status: Final result    Ordering provider: Diana Faith APRN CNP  05/12/18 0716 Resulting lab: [facility]    Specimen Information    Type Source Collected On   Blood  05/12/18 0625          Components       Value Reference Range Flag Lab   WBC 11.5 4.0 - 11.0 10e9/L H FrStHsLb   RBC Count 1.93 4.4 - 5.9 10e12/L L FrStHsLb   Hemoglobin 5.7 13.3 - 17.7 g/dL LL FrStHsLb   Comment:  This result has been called to MORE RN ON [address] AT 0658 MA by Vianney   Hematocrit 16.8 40.0 - 53.0 % L FrStHsLb   MCV 87 78 - 100 fl  FrStHsLb   MCH 29.5 26.5 - 33.0 pg  FrStHsLb   MCHC 33.9 31.5 - 36.5 g/dL  FrStHsLb   RDW 15.6 10.0 - 15.0 % H FrStHsLb   Platelet Count 151 150 - 450 10e9/L  FrStHsLb            Basic metabolic panel [[id number]] (Abnormal)  Resulted:  05/12/18 0755, Result status: Final result    Ordering provider: Kike Knight MD  05/12/18 0000 Resulting lab: [facility]    Specimen Information    Type Source Collected On   Blood  05/12/18 0625          Components       Value Reference Range Flag Lab   Sodium 142 133 - 144 mmol/L  FrStHsLb   Potassium 4.5 3.4 - 5.3 mmol/L  FrStHsLb   Chloride 113 94 - 109 mmol/L H FrStHsLb   Carbon Dioxide 19 20 - 32 mmol/L L FrStHsLb   Anion Gap 10 3 - 14 mmol/L  FrStHsLb   Glucose 137 70 - 99 mg/dL H FrStHsLb   Urea Nitrogen 76 7 - 30 mg/dL H FrStHsLb   Creatinine 3.76 0.66 - 1.25 mg/dL H FrStHsLb   GFR Estimate 15 >60 mL/min/1.7m2 L FrStHsLb   Comment:  Non  GFR Calc   GFR Estimate If Black 18 >60 mL/min/1.7m2 L FrStHsLb   Comment:  African American GFR Calc   Calcium 6.8 8.5 - 10.1 mg/dL L FrStHsLb            Hemoglobin [[id number]]  Resulted: 05/12/18 0744, Result status: Edited Result - FINAL    Ordering provider: Kelvin Humphrey MD  05/12/18 0605 Resulting lab: [facility]    Specimen Information    Type Source Collected On   Blood  05/12/18 0625          Components       Value Reference Range Flag Lab   Hemoglobin Canceled, Test credited 13.3 - 17.7 g/dL  FrStHsLb   Comment:         Duplicate request  SEE FULL CBC RESULTS  CORRECTED ON 05/12 AT 0744: PREVIOUSLY REPORTED AS 5.7 This result has been   called to MORE RN ON 88 AT 0658 MA by Kacie Jordan on 05 12 2018 at 0657, and   has been read back.               Testing Performed By     Lab - Abbreviation Name Director Address Valid Date Range    14 - FrStHsLb [facility] Unknown [address] [place] [address] 05/08/15 1057 - Present    75 - Unknown [facility] Unknown [address] [facility] [address] 01/15/15 1019 - Present    225 - Unknown [facility] Unknown [address] [facility] [address] 12/19/14 0954 - Present             Unresulted Labs (24h ago through future)    Start       Ordered    Unscheduled  Red blood cell prepare order unit conditional  (Conditional Red Blood Cells Unit)  CONDITIONAL (SPECIFY) BLOOD,   Routine     Comments:  For patients with significant heart failure:  Recommend transfusing slowly using the 4 hour allowed time period, if clinically appropriate.  Do NOT change or add to this text.  Use additional instructions field.   Question Answer Comment   Irradiation Indication Not irradiated    Red Blood Cells: NON-Irradiated    Number of Units 1    When to transfuse Hemoglobin is 7 g/dL or less (anemia)    Special Requirements None    Infusion Duration Infuse within 4 hours of release        18 0757         ECG/EMG Results      ECHO COMPLETE WITH OPTISON [[id number]]  Resulted: 18 1329, Result status: Edited Result - FINAL    Ordering provider: Aida Humphrey MD  18 1215 Resulted by: Damien Christensen MD    Performed: 18 1325 - 18 1411 Resulting lab: RADIOLOGY RESULTS    Narrative:       [id number]  [id number]  [id number]  [id number]^JUAN^AIDA^MAYITO           [facility]  Echocardiography Laboratory  [address] [place] [address]        Name: SHY BELLA  MRN: [id number]  : 1926  Study Date: 2018 01:29 PM  Age: 89 yrs  Gender: Male  Patient [facility]: [facility]  Reason For Study: Bradycardia - Sinus  Ordering Physician: AIDA HUMPHREY  Referring Physician: NARENDRA ALLEN  Performed By: Raj Leary RDCS     BSA: 1.8 m2  Height: 67 in  Weight: 145 lb  HR: 65  BP: 166/90 mmHg  _____________________________________________________________________________  __        Procedure  Complete Portable Echo Adult. Contrast Optison.  _____________________________________________________________________________  __        Interpretation Summary     1. The left ventricle is normal in size. The visual ejection fraction is  estimated at  45-50%. There is mild global hypokinesia of the left ventricle.  2. The right ventricle is normal in structure, function and size.  3. The left atrium is severely dilated.  4. There is mild to moderate (1-2+) mitral regurgitation.  5. There is mild to moderate (1-2+) tricuspid regurgitation.  6. Pulmonary hypertension The right ventricular systolic pressure is  approximated at 56mmHg plus the right atrial pressure.  7. Moderate valvular aortic stenosis. Mean gradient 23mmHg, Vmax 3.3m/s, DI  0.24, J CARLOS 1.1cm2, normal SVI of 39ml/m2.     No previous echo for comparison.  _____________________________________________________________________________  __        Left Ventricle  The left ventricle is normal in size. There is mild concentric left  ventricular hypertrophy. The visual ejection fraction is estimated at 45-50%.  Grade I or early diastolic dysfunction. There is mild global hypokinesia of  the left ventricle.     Right Ventricle  The right ventricle is normal in structure, function and size.     Atria  The left atrium is severely dilated. The right atrium is moderately dilated.  There is no atrial shunt seen.     Mitral Valve  There is mild to moderate (1-2+) mitral regurgitation.        Tricuspid Valve  There is mild to moderate (1-2+) tricuspid regurgitation. Pulmonary  hypertension. The right ventricular systolic pressure is approximated at  56mmHg plus the right atrial pressure.     Aortic Valve  There is trace aortic regurgitation. Moderate valvular aortic stenosis. Mean  gradient 23mmHg, Vmax 3.3m/s, DI 0.24, J CARLOS 1.1cm2, normal SVI of 39ml/m2.     Pulmonic Valve  The pulmonic valve is normal in structure and function.     Vessels  The ascending aorta is Borderline dilated. The IVC is normal in size and  reactivity with respiration, suggesting normal central venous pressure.     Pericardium  There is no pericardial effusion.        Rhythm  Sinus rhythm was  noted.  _____________________________________________________________________________  __  MMode/2D Measurements & Calculations  IVSd: 0.84 cm     LVIDd: 5.1 cm  LVIDs: 3.9 cm  LVPWd: 1.2 cm  FS: 22.7 %  LV mass(C)d: 198.4 grams  LV mass(C)dI: 112.5 grams/m2  Ao root diam: 3.5 cm  LA dimension: 5.2 cm  asc Aorta Diam: 3.8 cm  LA/Ao: 1.5  LVOT diam: 2.3 cm  LVOT area: 4.3 cm2  LA Volume (BP): 116.0 ml  LA Volume Index (BP): 65.9 ml/m2  RWT: 0.49           Doppler Measurements & Calculations  MV E max david: 125.0 cm/sec  MV A max david: 110.6 cm/sec  MV E/A: 1.1  MV dec time: 0.17 sec  Ao V2 max: 333.4 cm/sec  Ao max P.0 mmHg  Ao V2 mean: 223.5 cm/sec  Ao mean P.7 mmHg  Ao V2 VTI: 68.5 cm  J CARLOS(I,D): 1.0 cm2  J CARLOS(V,D): 1.2 cm2  LV V1 max PG: 3.5 mmHg  LV V1 max: 93.0 cm/sec  LV V1 VTI: 16.1 cm  SV(LVOT): 69.5 ml  SI(LVOT): 39.4 ml/m2  PA acc time: 0.10 sec  TR max david: 374.6 cm/sec  TR max P.1 mmHg  AV David Ratio (DI): 0.28  J CARLOS Index (cm2/m2): 0.58  E/E' av.5  Lateral E/e': 20.9  Medial E/e': 16.2              _____________________________________________________________________________  __        Report approved by: Mague Cooper 2018 03:45 PM       1    Type Source Collected On     18 1329          View Image (below)        Echocardiogram Complete [[id number]]  Resulted: 18 1411, Result status: In process    Ordering provider: Kelvin Humphrey MD  18 1215 Performed: 18 1410 - 18 1410    Resulting lab: [facility]                   Encounter-Level Documents:     There are no encounter-level documents.      Order-Level Documents:     There are no order-level documents.

## 2018-05-01 NOTE — IP AVS SNAPSHOT
MRN:[id number]                      After Visit Summary   5/1/2018    Yuriy Ghosh    MRN: [id number]           Thank you!     Thank you for choosing [facility] for your care. Our goal is always to provide you with excellent care. Hearing back from our patients is one way we can continue to improve our services. Please take a few minutes to complete the written survey that you may receive in the mail after you visit with us. Thank you!        Patient Information     Date Of Birth          5/4/1926        Designated Caregiver       Most Recent Value    Caregiver    Will someone help with your care after discharge? yes    Name of designated caregiver Eva Hernandez    Phone number of caregiver same as patient    Caregiver address same as patient      About your hospital stay     You were admitted on:  May 1, 2018 You last received care in the:  [place] [address] [facility]    You were discharged on:  May 15, 2018        Reason for your hospital stay       You were admitted for fall.                  Who to Call     For medical emergencies, please call 911.  For non-urgent questions about your medical care, please call your primary care provider or clinic, [phone]  For questions related to your surgery, please call your surgery clinic        Attending Provider     Provider Specialty    Ty De Luna MD Emergency Medicine    Shakir Erickson MD Internal Medicine    Humphrey, Kelvin Elliott MD Internal Medicine       Primary Care Provider Office Phone # Fax #    Kal Guadalupe [phone]      After Care Instructions     Activity - Up with nursing assistance           Advance Diet as Tolerated       Follow this diet upon discharge:      Combination Diet Low Saturated Fat Na <2400mg Diet            Fall precautions           General info for SNF       Length of Stay Estimate: Short Term Care: Estimated # of Days <30  Condition at Discharge: Improving  Level of  care:skilled   Rehabilitation Potential: Fair  Admission H&P remains valid and up-to-date: Yes  Recent Chemotherapy: N/A  Use Nursing Home Standing Orders: N/A            Mantoux instructions       Give two-step Mantoux (PPD) Per Facility Policy Yes                  Follow-up Appointments     Follow Up and recommended labs and tests       Follow up with Nursing home physician.  Repeat BMP later this week for cr stability.  Assess testicular lesion and if needed Dermatology referral  Follow up with Dermatology if NH physician deems appropriate  Follow up with Sherie Farrell NP, [facility] (Nephrology) on June 21st at 9:30 AM  [facility] address:  Clinic ([facility] - next to [facility])  [address], [place], [place] [address]    Follow up with Dr. Katharine MCLEOD on May 24th at 2:00PM.   office address:  [address] [place] [address] (across from [facility])  [place], [place] [id number] [phone]    Follow up with Dr. Pizarro of Urology as needed                  Additional Services     Occupational Therapy Adult Consult       Evaluate and treat as clinically indicated.    Reason:  deconditioning            Physical Therapy Adult Consult       Evaluate and treat as clinically indicated.    Reason:  deconditioning                  Future tests that were ordered for you     AntiEmbolism Stockings       Bilateral below knee length.On in the morning, off at night                  Pending Results     Date and Time Order Name Status Description    5/12/2018 0749 EKG 12-lead, tracing only Preliminary     5/11/2018 0600 EKG 12-lead, tracing only Preliminary             Statement of Approval     Ordered          05/15/18 1104  I have reviewed and agree with all the recommendations and orders detailed in this document.  EFFECTIVE NOW     Approved and electronically signed by:  Tutu Scott MD             Admission Information     Date & Time  "Provider Department Dept. Phone    2018 Kelvin Humphrey MD [facility] [place] [address] [facility] [phone]      Your Vitals Were     Blood Pressure Pulse Temperature Respirations Height Weight    156/66 (BP Location: Right arm) 54 95.9  F (35.5  C) (Oral) 16 1.702 m (5' 7\") 64.9 kg (143 lb 1.6 oz)    Pulse Oximetry BMI (Body Mass Index)                96% 22.41 kg/m2          [employer] Information     [employer] lets you send messages to your doctor, view your test results, renew your prescriptions, schedule appointments and more. To sign up, go to [email or web address] . Click on \"Log in\" on the left side of the screen, which will take you to the Welcome page. Then click on \"Sign up Now\" on the right side of the page.     You will be asked to enter the access code listed below, as well as some personal information. Please follow the directions to create your username and password.     Your access code is: [email or web address]  Expires: 2018 10:00 AM     Your access code will  in 90 days. If you need help or a new code, please call your [facility] clinic or [phone].        Care EveryWhere ID     This is your Care EveryWhere ID. This could be used by other organizations to access your [facility] medical records  [id number]        Equal Access to Services     RYAN STATON AH: Hadii sarika lyncho Soflaquito, waaxda luqadaha, qaybta kaalmada adeegyada, boogie bailey . So [facility] [phone].    ATENCIÓN: Si habla español, tiene a tidwell disposición servicios gratuitos de asistencia lingüística. Era pete [phone].    We comply with applicable federal civil rights laws and [place] laws. We do not discriminate on the basis of race, color, national origin, age, disability, sex, sexual orientation, or gender identity.               Review of your medicines      START taking        Dose / Directions    acetaminophen 325 MG tablet   Commonly known as:  TYLENOL   Used for:  Multiple joint " pain        Dose:  650 mg   Take 2 tablets (650 mg) by mouth every 4 hours as needed for mild pain or fever   Quantity:  100 tablet   Refills:  0       finasteride 5 MG tablet   Commonly known as:  PROSCAR   Used for:  Benign prostatic hyperplasia with urinary retention        Dose:  5 mg   Take 1 tablet (5 mg) by mouth daily   Quantity:  30 tablet   Refills:  0       hydrALAZINE 50 MG tablet   Commonly known as:  APRESOLINE   Used for:  Benign essential hypertension        Dose:  50 mg   Take 1 tablet (50 mg) by mouth 3 times daily   Quantity:  60 tablet   Refills:  0       pantoprazole 40 MG EC tablet   Commonly known as:  PROTONIX   Used for:  PUD (peptic ulcer disease)        Dose:  40 mg   Take 1 tablet (40 mg) by mouth every morning   Quantity:  30 tablet   Refills:  0       * QUEtiapine 25 MG tablet   Commonly known as:  SEROquel   Used for:  Dementia with behavioral disturbance, unspecified dementia type        Dose:  25 mg   Take 1 tablet (25 mg) by mouth At Bedtime   Quantity:  60 tablet   Refills:  0       * QUEtiapine 25 MG tablet   Commonly known as:  SEROquel   Used for:  Dementia with behavioral disturbance, unspecified dementia type        Dose:  12.5 mg   Take 0.5 tablets (12.5 mg) by mouth 3 times daily as needed (restlessness/agitation)   Quantity:  60 tablet   Refills:  0       * Notice:  This list has 2 medication(s) that are the same as other medications prescribed for you. Read the directions carefully, and ask your doctor or other care provider to review them with you.      CONTINUE these medicines which may have CHANGED, or have new prescriptions. If we are uncertain of the size of tablets/capsules you have at home, strength may be listed as something that might have changed.        Dose / Directions    amLODIPine 10 MG tablet   Commonly known as:  NORVASC   This may have changed:    - medication strength  - how much to take   Used for:  Benign essential hypertension        Dose:  10 mg    Take 1 tablet (10 mg) by mouth daily   Quantity:  30 tablet   Refills:  2         CONTINUE these medicines which have NOT CHANGED        Dose / Directions    ASPIRIN PO        Dose:  81 mg   Take 81 mg by mouth daily   Refills:  0       calcium citrate-vitamin D 315-250 MG-UNIT Tabs per tablet   Commonly known as:  CITRACAL        Dose:  1 tablet   Take 1 tablet by mouth 2 times daily   Refills:  0       CYANOCOBALAMIN PO        Dose:  2500 mcg   Take 2,500 mcg by mouth daily   Refills:  0       DONEPEZIL HCL PO        Dose:  10 mg   Take 10 mg by mouth At Bedtime   Refills:  0       FOLIC ACID PO        Dose:  400 mcg   Take 400 mcg by mouth daily   Refills:  0       KEPPRA PO        Dose:  250 mg   Take 250 mg by mouth At Bedtime   Refills:  0       LEVOTHYROXINE SODIUM PO        Dose:  125 mcg   Take 125 mcg by mouth daily   Refills:  0       MEMANTINE HCL PO        Dose:  10 mg   Take 10 mg by mouth 2 times daily   Refills:  0       VITAMIN D (CHOLECALCIFEROL) PO        Dose:  1000 Units   Take 1,000 Units by mouth daily   Refills:  0         STOP taking     Biotin 5000 MCG Caps           CITALOPRAM HYDROBROMIDE PO           DOXAZOSIN MESYLATE PO           fish oil-omega-3 fatty acids 1000 MG capsule           Garlic 1000 MG Caps           GINKGO BILOBA MEMORY ENHANCER PO           Glucosamine-Chondroitin 750-600 MG Chew           LISINOPRIL PO           METOPROLOL SUCCINATE ER PO           MULTIVITAMIN & MINERAL PO           olmesartan-hydrochlorothiazide 20-12.5 MG per tablet   Commonly known as:  BENICAR           PRAVASTATIN SODIUM PO           Turmeric 450 MG Caps           VITAMIN C PO                Where to get your medicines      Some of these will need a paper prescription and others can be bought over the counter. Ask your nurse if you have questions.     You don't need a prescription for these medications     acetaminophen 325 MG tablet    amLODIPine 10 MG tablet    finasteride 5 MG tablet     hydrALAZINE 50 MG tablet    pantoprazole 40 MG EC tablet    QUEtiapine 25 MG tablet    QUEtiapine 25 MG tablet                Protect others around you: Learn how to safely use, store and throw away your medicines at www.disposemymeds.org.             Medication List: This is a list of all your medications and when to take them. Check marks below indicate your daily home schedule. Keep this list as a reference.      Medications           Morning Afternoon Evening Bedtime As Needed    acetaminophen 325 MG tablet   Commonly known as:  TYLENOL   Take 2 tablets (650 mg) by mouth every 4 hours as needed for mild pain or fever                                amLODIPine 10 MG tablet   Commonly known as:  NORVASC   Take 1 tablet (10 mg) by mouth daily   Last time this was given:  10 mg on 5/15/2018  7:37 AM                                ASPIRIN PO   Take 81 mg by mouth daily                                calcium citrate-vitamin D 315-250 MG-UNIT Tabs per tablet   Commonly known as:  CITRACAL   Take 1 tablet by mouth 2 times daily                                CYANOCOBALAMIN PO   Take 2,500 mcg by mouth daily                                DONEPEZIL HCL PO   Take 10 mg by mouth At Bedtime   Last time this was given:  10 mg on 5/14/2018  9:28 PM                                finasteride 5 MG tablet   Commonly known as:  PROSCAR   Take 1 tablet (5 mg) by mouth daily   Last time this was given:  5 mg on 5/15/2018  7:37 AM                                FOLIC ACID PO   Take 400 mcg by mouth daily                                hydrALAZINE 50 MG tablet   Commonly known as:  APRESOLINE   Take 1 tablet (50 mg) by mouth 3 times daily   Last time this was given:  50 mg on 5/15/2018  1:38 PM                                KEPPRA PO   Take 250 mg by mouth At Bedtime   Last time this was given:  250 mg on 5/14/2018  9:28 PM                                LEVOTHYROXINE SODIUM PO   Take 125 mcg by mouth daily   Last time this was  given:  125 mcg on 5/15/2018  6:30 AM                                MEMANTINE HCL PO   Take 10 mg by mouth 2 times daily   Last time this was given:  10 mg on 5/15/2018 10:19 AM                                pantoprazole 40 MG EC tablet   Commonly known as:  PROTONIX   Take 1 tablet (40 mg) by mouth every morning   Last time this was given:  40 mg on 5/15/2018  7:38 AM                                * QUEtiapine 25 MG tablet   Commonly known as:  SEROquel   Take 1 tablet (25 mg) by mouth At Bedtime   Last time this was given:  25 mg on 5/14/2018  8:02 PM                                * QUEtiapine 25 MG tablet   Commonly known as:  SEROquel   Take 0.5 tablets (12.5 mg) by mouth 3 times daily as needed (restlessness/agitation)   Last time this was given:  25 mg on 5/14/2018  8:02 PM                                VITAMIN D (CHOLECALCIFEROL) PO   Take 1,000 Units by mouth daily                                * Notice:  This list has 2 medication(s) that are the same as other medications prescribed for you. Read the directions carefully, and ask your doctor or other care provider to review them with you.              More Information        When You Have Gastrointestinal (GI) Bleeding    Blood in your vomit or stool can be a sign of gastrointestinal (GI) bleeding. GI bleeding can be scary. But the cause may not be serious. You should always see a doctor if GI bleeding occurs.  The GI tract  The GI tract is the path through which food travels in the body. Food passes from the mouth down the esophagus (the tube from the mouth to the stomach). Food begins to break down in the stomach. It then moves through the duodenum, the first part of the small intestine. Nutrients are absorbed as food travels through the small intestine. What is left passes into the colon (large intestine) as waste. The colon removes water from the waste. Waste continues from the colon to the rectum (where stool is stored). Waste then leaves the  body through the anus.  Causes of GI bleeding  GI bleeding can be caused by many different problems. Some of the more common causes include:    Swollen veins in the anus (hemorrhoids)    Swollen veins in the esophagus (varices)    Sore on the lining of the GI tract (ulcer)    Cuts or scrapes in the mouth or throat    Infection caused by germs such as bacteria or parasites    Food allergies, such as milk allergy in young children    Medicines    Inflammation of the GI tract (gastritis or esophagitis)    Colitis (Crohn's disease or ulcerative colitis)    Cancer (tumors or polyps)    Abnormal pouches in the colon (diverticula)    Tears in the esophagus or anus    Nosebleed    Abnormal blood vessels in the GI tract (angiodysplasia)  Diagnosing the cause of blood in stool  If blood is coming out in your stool, you may have a lower GI tract problem or a very fast upper GI tract bleed. Bleeding from the GI tract can be bright red. Or it may look dark and tarry. Tests may also find blood in your stool that can t be seen with the eye (occult blood). To find out the cause, tests that may be ordered include:    Blood tests. A blood sample is taken and sent to a lab for exam.    Hemoccult test. Checks a stool sample for blood.    Stool culture. Checks a stool sample for bacteria or parasites.    X-ray, ultrasound, or CT scan. Imaging tests that take pictures of the digestive tract.    Colonoscopy or sigmoidoscopy. This test uses a flexible tube with a tiny camera. The tube is inserted through your anus into your rectum to see the inside of your colon. Your provider can also take a tiny tissue sample (biopsy) and treat a bleeding source  Diagnosing the cause of blood in vomit  If you are vomiting blood or something that looks like coffee grounds, you may have an upper GI tract problem. To find the cause, tests that may be done include:    Upper Endoscopy. A flexible tube with a tiny camera is inserted through your mouth and  throat to see inside your upper GI tract. This lets your provider take a tiny tissue sample (biopsy) and treat a bleeding source.    Nasogastric lavage. This can tell if you have upper GI or lower GI bleeding.    X-ray, ultrasound, or CT scan. Imaging tests that take pictures of your digestive tract.    Upper GI series. X-rays of the upper part of your GI tract taken from inside your body.    Enteroscopy. This sends a flexible tube or a small, swallowed capsule camera into your small intestine.  When to call your healthcare provider  Call your healthcare provider right away if you have any of the following:    Bleeding from your mouth or anus that can't be stopped    Fever of 100.4 F (38.0 ) or higher    Bleeding along with feeling lightheaded or dizzy    Signs of fluid loss (dehydration). These include a dry, sticky mouth, decreased urine output; and very dark urine.    Belly (abdominal) pain   Date Last Reviewed: 7/1/2016 2000-2017 [employer]. [address] [place] [address]. All rights reserved. This information is not intended as a substitute for professional medical care. Always follow your healthcare professional's instructions.                Fall Prevention  Falls often occur due to slipping, tripping or losing your balance. Millions of people fall every year and injure themselves. Here are ways to reduce your risk of falling again.    Think about your fall, was there anything that caused your fall that can be fixed, removed, or replaced?    Make your home safe by keeping walkways clear of objects you may trip over.    Use non-slip pads under rugs. Do not use area rugs or small throw rugs.    Use non-slip mats in bathtubs and showers.    Install handrails and lights on staircases.    Do not walk in poorly lit areas.    Do not stand on chairs or wobbly ladders.    Use caution when reaching overhead or looking upward. This position can cause a loss of balance.    Be sure your shoes  fit properly, have non-slip bottoms and are in good condition.     Wear shoes both inside and out. Avoid going barefoot or wearing slippers.    Be cautious when going up and down stairs, curbs, and when walking on uneven sidewalks.    If your balance is poor, consider using a cane or walker.    If your fall was related to alcohol use, stop or limit alcohol intake.     If your fall was related to use of sleeping medicines, talk to your doctor about this. You may need to reduce your dosage at bedtime if you awaken during the night to go to the bathroom.      To reduce the need for nighttime bathroom trips:  ? Avoid drinking fluids for several hours before going to bed  ? Empty your bladder before going to bed  ? Men can keep a urinal at the bedside    Stay as active as you can. Balance, flexibility, strength, and endurance all come from exercise. They all play a role in preventing falls. Ask your healthcare provider which types of activity are right for you.    Get your vision checked on a regular basis.    If you have pets, know where they are before you stand up or walk so you don't trip over them.    Use night lights.  Date Last Reviewed: 11/5/2015 2000-2017 [employer]. [address] [place] [address], [place] [address]. All rights reserved. This information is not intended as a substitute for professional medical care. Always follow your healthcare professional's instructions.

## 2018-05-02 ENCOUNTER — APPOINTMENT (OUTPATIENT)
Dept: CT IMAGING | Facility: CLINIC | Age: 83
DRG: 682 | End: 2018-05-02
Attending: PHYSICIAN ASSISTANT
Payer: MEDICARE

## 2018-05-02 LAB
ALBUMIN UR-MCNC: 300 MG/DL
ANION GAP SERPL CALCULATED.3IONS-SCNC: 9 MMOL/L (ref 3–14)
APPEARANCE UR: ABNORMAL
BASOPHILS # BLD AUTO: 0 10E9/L (ref 0–0.2)
BASOPHILS NFR BLD AUTO: 0.3 %
BILIRUB UR QL STRIP: NEGATIVE
BUN SERPL-MCNC: 45 MG/DL (ref 7–30)
CALCIUM SERPL-MCNC: 7.5 MG/DL (ref 8.5–10.1)
CHLORIDE SERPL-SCNC: 115 MMOL/L (ref 94–109)
CO2 SERPL-SCNC: 21 MMOL/L (ref 20–32)
COLOR UR AUTO: ABNORMAL
CREAT SERPL-MCNC: 3.09 MG/DL (ref 0.66–1.25)
DIFFERENTIAL METHOD BLD: ABNORMAL
EOSINOPHIL # BLD AUTO: 0.4 10E9/L (ref 0–0.7)
EOSINOPHIL NFR BLD AUTO: 5.4 %
ERYTHROCYTE [DISTWIDTH] IN BLOOD BY AUTOMATED COUNT: 15 % (ref 10–15)
GFR SERPL CREATININE-BSD FRML MDRD: 19 ML/MIN/1.7M2
GLUCOSE SERPL-MCNC: 98 MG/DL (ref 70–99)
GLUCOSE UR STRIP-MCNC: 70 MG/DL
HCT VFR BLD AUTO: 26.7 % (ref 40–53)
HGB BLD-MCNC: 9 G/DL (ref 13.3–17.7)
HGB UR QL STRIP: ABNORMAL
IMM GRANULOCYTES # BLD: 0 10E9/L (ref 0–0.4)
IMM GRANULOCYTES NFR BLD: 0.1 %
INTERPRETATION ECG - MUSE: NORMAL
KETONES UR STRIP-MCNC: NEGATIVE MG/DL
LEUKOCYTE ESTERASE UR QL STRIP: NEGATIVE
LYMPHOCYTES # BLD AUTO: 1.3 10E9/L (ref 0.8–5.3)
LYMPHOCYTES NFR BLD AUTO: 19 %
MCH RBC QN AUTO: 29.5 PG (ref 26.5–33)
MCHC RBC AUTO-ENTMCNC: 33.7 G/DL (ref 31.5–36.5)
MCV RBC AUTO: 88 FL (ref 78–100)
MONOCYTES # BLD AUTO: 0.4 10E9/L (ref 0–1.3)
MONOCYTES NFR BLD AUTO: 5.4 %
NEUTROPHILS # BLD AUTO: 4.9 10E9/L (ref 1.6–8.3)
NEUTROPHILS NFR BLD AUTO: 69.8 %
NITRATE UR QL: NEGATIVE
NRBC # BLD AUTO: 0 10*3/UL
NRBC BLD AUTO-RTO: 0 /100
PH UR STRIP: 6 PH (ref 5–7)
PLATELET # BLD AUTO: 109 10E9/L (ref 150–450)
POTASSIUM SERPL-SCNC: 4.3 MMOL/L (ref 3.4–5.3)
RBC # BLD AUTO: 3.05 10E12/L (ref 4.4–5.9)
RBC #/AREA URNS AUTO: >182 /HPF (ref 0–2)
SODIUM SERPL-SCNC: 145 MMOL/L (ref 133–144)
SOURCE: ABNORMAL
SP GR UR STRIP: 1.01 (ref 1–1.03)
SQUAMOUS #/AREA URNS AUTO: <1 /HPF (ref 0–1)
UROBILINOGEN UR STRIP-MCNC: NORMAL MG/DL (ref 0–2)
WBC # BLD AUTO: 7 10E9/L (ref 4–11)
WBC #/AREA URNS AUTO: 0 /HPF (ref 0–5)

## 2018-05-02 PROCEDURE — 93005 ELECTROCARDIOGRAM TRACING: CPT

## 2018-05-02 PROCEDURE — 25000132 ZZH RX MED GY IP 250 OP 250 PS 637: Mod: GY | Performed by: INTERNAL MEDICINE

## 2018-05-02 PROCEDURE — 81001 URINALYSIS AUTO W/SCOPE: CPT | Performed by: EMERGENCY MEDICINE

## 2018-05-02 PROCEDURE — 36415 COLL VENOUS BLD VENIPUNCTURE: CPT | Performed by: INTERNAL MEDICINE

## 2018-05-02 PROCEDURE — A9270 NON-COVERED ITEM OR SERVICE: HCPCS | Mod: GY | Performed by: INTERNAL MEDICINE

## 2018-05-02 PROCEDURE — 85025 COMPLETE CBC W/AUTO DIFF WBC: CPT | Performed by: INTERNAL MEDICINE

## 2018-05-02 PROCEDURE — 80048 BASIC METABOLIC PNL TOTAL CA: CPT | Performed by: INTERNAL MEDICINE

## 2018-05-02 PROCEDURE — 74176 CT ABD & PELVIS W/O CONTRAST: CPT

## 2018-05-02 PROCEDURE — 27210995 ZZH RX 272: Performed by: PHYSICIAN ASSISTANT

## 2018-05-02 PROCEDURE — 99225 ZZC SUBSEQUENT OBSERVATION CARE,LEVEL II: CPT | Performed by: PHYSICIAN ASSISTANT

## 2018-05-02 PROCEDURE — 96361 HYDRATE IV INFUSION ADD-ON: CPT

## 2018-05-02 PROCEDURE — 25000128 H RX IP 250 OP 636: Performed by: PHYSICIAN ASSISTANT

## 2018-05-02 PROCEDURE — G0378 HOSPITAL OBSERVATION PER HR: HCPCS

## 2018-05-02 RX ORDER — SODIUM CHLORIDE 450 MG/100ML
INJECTION, SOLUTION INTRAVENOUS CONTINUOUS
Status: DISCONTINUED | OUTPATIENT
Start: 2018-05-02 | End: 2018-05-02

## 2018-05-02 RX ORDER — SODIUM CHLORIDE, SODIUM LACTATE, POTASSIUM CHLORIDE, CALCIUM CHLORIDE 600; 310; 30; 20 MG/100ML; MG/100ML; MG/100ML; MG/100ML
INJECTION, SOLUTION INTRAVENOUS CONTINUOUS
Status: DISCONTINUED | OUTPATIENT
Start: 2018-05-02 | End: 2018-05-05

## 2018-05-02 RX ORDER — CHLORAL HYDRATE 500 MG
1 CAPSULE ORAL DAILY
Status: ON HOLD | COMMUNITY
End: 2018-05-11

## 2018-05-02 RX ADMIN — MEMANTINE 10 MG: 10 TABLET ORAL at 07:59

## 2018-05-02 RX ADMIN — METOPROLOL SUCCINATE 50 MG: 50 TABLET, EXTENDED RELEASE ORAL at 07:59

## 2018-05-02 RX ADMIN — LEVETIRACETAM 250 MG: 250 TABLET, FILM COATED ORAL at 23:09

## 2018-05-02 RX ADMIN — MEMANTINE 10 MG: 10 TABLET ORAL at 20:55

## 2018-05-02 RX ADMIN — SODIUM CHLORIDE: 4.5 INJECTION, SOLUTION INTRAVENOUS at 08:40

## 2018-05-02 RX ADMIN — AMLODIPINE BESYLATE 10 MG: 10 TABLET ORAL at 07:59

## 2018-05-02 RX ADMIN — SODIUM CHLORIDE, POTASSIUM CHLORIDE, SODIUM LACTATE AND CALCIUM CHLORIDE: 600; 310; 30; 20 INJECTION, SOLUTION INTRAVENOUS at 13:43

## 2018-05-02 NOTE — PLAN OF CARE
Problem: Patient Care Overview  Goal: Plan of Care/Patient Progress Review  Outcome: Improving  OBSERVATION GOALS:  -diagnostic tests and consults completed and resulted: not met, needs SW and PT   -vital signs normal or at patient baseline: partially met; BP slightly elevated 155/77, low HR after metoprolol admin

## 2018-05-02 NOTE — PROVIDER NOTIFICATION
Pt ambulated to bathroom with walker gait belt. Sm BM, scant b;leeding during urination. Resposne: Update hospitalist if continues. Pending UA collection. Continue to monitor.

## 2018-05-02 NOTE — ED NOTES
Writer attempted to straight cath pt, unable to advance cath past prostate, pt unable to tolerate further attempts, will address with ED MD.  Pt tried to void after but unable.

## 2018-05-02 NOTE — PHARMACY-ADMISSION MEDICATION HISTORY
Admission medication history interview status for the 5/1/2018  admission is complete. See EPIC admission navigator for prior to admission medications     Medication history source reliability:Moderate    Actions taken by pharmacist (provider contacted, etc): spoke with wife who brought in pill bottles and supplements.       Additional medication history information not noted on PTA med list : Recent MD office note has listed that pt should be on lisinopril, pravastatin, metoprolol and olmesartan/HCTZ (left all these on the list) but pt has not been filling them (they are all on his file) at [facility] and has not been taking them.  Wife seemed confused about whether or not he should be on them or not.  Last fill date for lisinopril was 10-22-16), metoprolol (12-3-16), benicar (12-5-16) and pravastatin (7-6-17). [facility] transferred from [facility].  I left these 4 meds on the list for the doctor here to determine whether or not they should be continued.       Medication reconciliation/reorder completed by provider prior to medication history? No    Time spent in this activity: 30 minutes    Prior to Admission medications    Medication Sig Last Dose Taking? Auth Provider   AMLODIPINE BESYLATE PO Take 5 mg by mouth daily Past Week at Unknown time Yes Unknown, Entered By History   Ascorbic Acid (VITAMIN C PO) Take 1,000 mg by mouth daily With rosehips Past Week at Unknown time Yes Unknown, Entered By History   ASPIRIN PO Take 81 mg by mouth daily Past Week at Unknown time Yes Unknown, Entered By History   Biotin 5000 MCG CAPS Take 5,000 mcg by mouth daily Past Week at Unknown time Yes Unknown, Entered By History   calcium citrate-vitamin D (CITRACAL) 315-250 MG-UNIT TABS per tablet Take 1 tablet by mouth 2 times daily Past Week at Unknown time Yes Unknown, Entered By History   CITALOPRAM HYDROBROMIDE PO Take 20 mg by mouth daily Past Week at Unknown time Yes Unknown, Entered By History   CYANOCOBALAMIN PO Take 2,500 mcg by  mouth daily Past Week at Unknown time Yes Unknown, Entered By History   DONEPEZIL HCL PO Take 10 mg by mouth At Bedtime Past Week at Unknown time Yes Unknown, Entered By History   DOXAZOSIN MESYLATE PO Take 8 mg by mouth At Bedtime Past Week at Unknown time Yes Unknown, Entered By History   fish oil-omega-3 fatty acids 1000 MG capsule Take 1 g by mouth daily Past Week at Unknown time Yes Unknown, Entered By History   FOLIC ACID PO Take 400 mcg by mouth daily Past Week at Unknown time Yes Unknown, Entered By History   Garlic 1000 MG CAPS Take 1,000 mg by mouth daily Past Week at Unknown time Yes Unknown, Entered By History   GINKGO BILOBA MEMORY ENHANCER PO Take 60 mg by mouth daily Past Week at Unknown time Yes Unknown, Entered By History   Glucosamine-Chondroitin 750-600 MG CHEW Take 1 tablet by mouth 2 times daily Past Week at Unknown time Yes Unknown, Entered By History   LevETIRAcetam (KEPPRA PO) Take 250 mg by mouth At Bedtime Past Week at Unknown time Yes Unknown, Entered By History   LEVOTHYROXINE SODIUM PO Take 125 mcg by mouth daily Past Week at Unknown time Yes Unknown, Entered By History   MEMANTINE HCL PO Take 10 mg by mouth 2 times daily Past Week at Unknown time Yes Unknown, Entered By History   Multiple Vitamins-Minerals (MULTIVITAMIN & MINERAL PO) Take 1 tablet by mouth daily Past Week at Unknown time Yes Unknown, Entered By History   Turmeric 450 MG CAPS Take 450 mg by mouth daily Past Week at Unknown time Yes Unknown, Entered By History   VITAMIN D, CHOLECALCIFEROL, PO Take 1,000 Units by mouth daily Past Week at Unknown time Yes Unknown, Entered By History   LISINOPRIL PO Take 20 mg by mouth daily   Unknown, Entered By History   METOPROLOL SUCCINATE ER PO Take 50 mg by mouth daily   Unknown, Entered By History   olmesartan-hydrochlorothiazide (BENICAR) 20-12.5 MG per tablet Take 1 tablet by mouth daily   Unknown, Entered By History   PRAVASTATIN SODIUM PO Take 20 mg by mouth every evening    Unknown, Entered By History     Jaimee Brenner, PharmD

## 2018-05-02 NOTE — H&P
Admitted:     05/01/2018      PRIMARY CARE PROVIDER:  Dr. Kal Guadalupe.      CHIEF COMPLAINT:  Fall.      HISTORY OF PRESENT ILLNESS:  Mr. Yuriy Ghosh is a 89-year-old gentleman with history noted below, including hypertension, peripheral arterial disease with prior abdominal aortic aneurysm repair, hypothyroidism, seizure disorder, dyslipidemia, BPH and dementia, who presents with the above complaints.  Please note, the patient does have dementia and is not a very good historian.  Much of the history is obtained from speaking with the ER physicians and from speaking with the patient's wife.  The patient was noted to have suffered a fall earlier today.  The patient was on a driveway and he fell.  Apparently, he had tripped on the steps from the garage to the driveway.  He denied any loss of consciousness.  The patient had a right elbow injury and was felt to have altered, impaired mentation and therefore, the wife called paramedics and he was brought to [facility] for further evaluation.      The patient seen in the ER by Dr. De Luna and vital signs were recorded and noted with temperature of 98.5 degrees, heart rate 52, blood pressure 151/130, respiration 18, O2 saturations 95% on room air.  He had laboratory evaluation which showed his creatinine is 3.38 and BUN of 48.  Of note, creatinine was around 2.0 in 11/2017.  X-rays of the right elbow and CT of the head without contrast were negative.  Overall, given his fall and acute kidney injury, request for admission was made.     No complaints of chest pain or shortness of breath.  No fevers, chills.  No nausea, vomiting, bloody stools or diarrhea.        PAST MEDICAL HISTORY:   1.  Hypertension.   2.  Peripheral arterial disease.  He has had prior abdominal aortic aneurysm stent endovascular repair about 4 year ago.  3.  Seizure disorder.  4.  Dyslipidemia.  5.  Hypothyroidism.   6.  Dementia, noted variously as vascular dementia or Alzheimer dementia.   7.   Depression/anxiety.     8.  BPH.     9.  Allergic rhinitis.   10.  Diverticulosis.   11.  Mild aortic stenosis.   13.  Chronic kidney disease.  Unclear of baseline, but last creatinine was 2.00 in 11/2017.  14.  History of GI bleed due to duodenal ulcer.   15.  History of transient global amnesia.      PAST SURGICAL HISTORY:   1.  Status post tonsillectomy and adenoidectomy.   2.  Status post right inguinal hernia.     3.  Status post hemorrhoidectomy.   4.  Status post cholecystectomy.   5.  Status post knee arthroscopy.   6.  Status post total knee arthroplasty.   7.  Status post prostate biopsy.   8.  Status post abdominal aortic aneurysm endograft repair about 4 years ago.      From my review of the electronic and Care Everywhere, it was unclear what his recent baseline is, but the last creatinine I can find was from 11/2017 and it was 2.00, prior to that it was 1.30 in 12/2016.      ALLERGIES:  NO KNOWN DRUG ALLERGIES.      HOME MEDICATIONS (Not yet reconciled and this is from Care Everywhere):   1.  Multivitamin.     2.  Fexofenadine 180 mg at bedtime.     3.  Vitamin B6 20 mg a day.   4.  Calcium with vitamin D twice daily.   5.  Vitamin C twice daily.   6.  Glucosamine/chondroitin twice daily.   7.  Dulcolax oral as needed.   8.  Aspirin 81 mg a day.   9.  Tramadol 50 mg every 4 hours p.r.n.   10.  Pravastatin 20 mg at bedtime.   11.  Metoprolol XL 50 mg a day.   12.  Aricept 10 mg at bedtime.   13.  Keppra 250 mg at bedtime.     14.  Namenda 10 mg b.i.d.   15.  Levothyroxine 125 mg a day.   16.  Lisinopril 20 mg a day.     17.  Olmesartan-hydrochlorothiazide 20-12.5 mg each day.   18.  Cardura 8 mg at bedtime.    19.  Amlodipine 5 mg a day.     20.  Celexa 20 mg a day.      SOCIAL HISTORY:  The patient is .  He has 1 son.  He does not smoke or drink.  He is retired and used to work in sales.  Presently, he lives with his wife at their home.      FAMILY HISTORY:  Reviewed and noncontributory.         REVIEW OF SYSTEMS:  As per HPI, otherwise 10-point review of systems negative.        PHYSICAL EXAMINATION:   VITAL SIGNS:  Temperature 98.5, heart rate 57, blood pressure 174/95, respiration 18, O2 saturation 96% on room air.   GENERAL:  Yuriy Ghosh is a 89-year-old male patient who is lying in bed.  He is conversant and friendly.   HEENT:  Pupils equal, round and reactive.  No scleral icterus or conjunctival injection.  Oropharynx is without gross erythema or exudate.   NECK:  No bruits, JVD or adenopathy.   HEART:  Slightly bradycardic, but regular with +1/6 systolic murmur.  No gallops or rubs.   LUNGS:  Diminished at bases, no crackles or wheezes.     CHEST:  Shows a barrel chest.   ABDOMEN:  Soft, nontender, nondistended with positive bowel sounds, no femoral bruits.   EXTREMITIES:  No significant edema, without knee joint swelling or skin rash.  On right elbow, there is a slight tear.      LABORATORY DATA:  Sodium 143, potassium 4.5, chloride 111, bicarb 23, BUN 48, creatinine 3.38, calcium 8.0.  LFTs showed albumin of 2.6, total protein 5.7 which were both low, otherwise normal.  Troponin is 0.020.  CBC showed a white count of 8.5, hemoglobin 12.1, platelets 138.  INR is 1.5.        IMAGING:  As above.        ASSESSMENT AND PLAN:  Mr. Yuriy Ghosh is a 89-year-old gentleman with history including hypertension, peripheral arterial disease with prior abdominal aortic aneurysm endograft repair, seizure disorder, dyslipidemia, hypothyroidism, BPH and dementia, who presents with a fall and found with acute kidney injury on chronic kidney disease.    1.  Acute kidney injury on chronic kidney disease, suspect prerenal component.  Unclear of baseline, but last creatinine was 2.00 in 11/2017.  We will need to finalize the medication list, but it appears he may be on both lisinopril and olmesartan/hydrochlorothiazide which could contribute.    At this time, we will hydrate with normal saline 100 mL an hour x 10  hours.  Hold lisinopril and olmesartan-hydrochlorothiazide.  Monitor BMP.  Avoid nephrotoxic medications.     2.  Fall, suspect multifactorial including related to problem #1.  Also suspect chronic gait instability.  The patient's wife does say that this is not the 1st time he has fallen.  Strongly advised that he consider the use of a cane or walker.  In addition to treating the above issues, we will ask physical therapy to see the patient.  Recommend he be discharged with a walker.    3.  Sinus bradycardia.  The patient has heart rate in the 50s.  I do see that he is on a number of medications which could cause bradycardia.  This includes metoprolol XL, as well as Aricept.  At this time, we will order hold parameters on metoprolol XL, as well as Aricept.  Monitor heart rate on telemetry.  Overall, he does not appear to be symptomatic, but if he is dropping to concerning levels, such as into the 40s, or if the patient is symptomatic, then may need to consider decreasing or stopping some of these medications.  At this time, I do not suspect this contributed to his fall, but again we will be monitoring him on telemetry.       4.  Essential hypertension.  We will need to finalize his home medication list.  We will be holding ACE inhibitor/ARB and diuretics for now.  Continue metoprolol XL, increase amlodipine from 5 to 10 mg a day.  We will order p.r.n. clonidine, p.r.n. IV hydralazine and p.r.n. IV labetalol.    5.  Peripheral arterial disease.  History of abdominal aortic aneurysm endograft repair about 4 years ago.  Continue aspirin.    6.  Hypothyroidism.  Continue levothyroxine.    7.  Seizure disorder.  Continue Keppra.     8.  Dyslipidemia.  Continue pravastatin.       9.  BPH.  Continue Cardura.       10.  Prophylaxis.  Pneumo boots and ambulation.      CODE STATUS:  Discussed with patient and wife and he is DNR/DNI.         JAIMEE THAKKAR JR., MD             D: 05/01/2018   T: 05/01/2018   MT: RIANNA      Name:      SHY BELLA   MRN:      [id number]        Account:      [id number]   :      1926        Admitted:     2018                   Document: [id number]

## 2018-05-02 NOTE — PLAN OF CARE
Problem: Patient Care Overview  Goal: Plan of Care/Patient Progress Review  Outcome: No Change  OBSERVATION GOALS:    -diagnostic tests and consults completed and resulted: not met  -vital signs normal or at patient baseline: partially met; BP elevated

## 2018-05-02 NOTE — PLAN OF CARE
Problem: Patient Care Overview  Goal: Plan of Care/Patient Progress Review  PT: Patient is not discharging home today, getting continued work up for medical issues. Hold PT eval for today to allow time for recovery and improved mobility performance.

## 2018-05-02 NOTE — PLAN OF CARE
A&Ox1, VSS on RA with ex of low heart rate. Hx dementia. Tele SB 2nd degree block. Denies pain. Bruising, R elbow abrasion open to air. Incontinent at times but has been voiding red urine this shift. Ax1 and walker. Cardiac diet. IV infusing. Plan for PT and SW to follow up on disposition planning.

## 2018-05-02 NOTE — PLAN OF CARE
Problem: Patient Care Overview  Goal: Plan of Care/Patient Progress Review  Outcome: No Change  Observation goals PRIOR TO DISCHARGE     -diagnostic tests and consults completed and resulted- Partially met- PT/SW in AM  -vital signs normal or at patient baseline- Partially met- BP eleveted   Nurse to notify provider when observation goals have been met and patient is ready for discharge.     Care Plan Summary Note: Pt A to self. VSS ex BP elevated. Pleasantly confused. Neuros PERRLA intact. R elbow wound. Denies pain. Tel-S-jessica. UA specimen pending. Cardiac/No caffeine diet. IVF infusing. A1 with GB & walker. SW/PT in AM. Continue to monitor.

## 2018-05-02 NOTE — PLAN OF CARE
Problem: Patient Care Overview  Goal: Plan of Care/Patient Progress Review  Outcome: No Change  OBSERVATION GOALS:    -diagnostic tests and consults completed and resulted: not met  -vital signs normal or at patient baseline: partially met; BP elevated    Oriented to self only. Denies pain. Tele SR 2nd degree block. R elbow abrasion open to air. Incontinent. Hematuria. Up w/ 1 and walker. Cardiac/No caf diet. SW and PT today. Continue to monitor.

## 2018-05-02 NOTE — PROGRESS NOTES
RECEIVING UNIT ED HANDOFF REVIEW    ED Nurse Handoff Report was reviewed by: Tez Garay on May 1, 2018 at 7:13 PM     Clarify patient activity level at baseline. Thanks

## 2018-05-02 NOTE — PROGRESS NOTES
[facility]    Hospitalist Progress Note      Assessment & Plan   Mr. Yuriy Ghosh is a 89-year-old gentleman with history including hypertension, 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     CKD with SARAHI: Previous Baseline 1.4, Cr 2.0 in [facility] 11/17  -- Cr Trend 3.3--3.09  -- UA with > 182 RBC  -- CT stone negative for CT or hydronephrosis. Very large prostate. Hematuria likely secondary to traumatic cath  -- Will check PVRs   -- Continue hold PTA olmesartan-HCTZ and lisinopril  -- Continue IVF at 75 ml/hr, will change NS 0.45 given hypernatremia  -- Follow BMP, consider urology consult vs nephrology pending labs and PVRs    Addendum: In discussion with wife patient had only been taking Norvasc for BP. No ACE/ARB or diuretic. Denies NSAIDs. Cause of worsening renal function unclear. Continue IV hydration. BMP in am    Bradycardia: EKG on admission with HR 40-50s with Mobitz, 1. HR SR 70s this am and fell to 40s after PTA BB. Repeat EKG with Mobitz type 1. No previous EKG or cardiac work-up in [facility] or [facility].  Patient does not appear to be symptomatic but difficult determine with advanced dementia  -- D/C Metoprolol 50 mg XL. Already received this am  -- Tele  -- Consider further work-up pending course. Patient currently asymptotic and DNR/DNI but certainly bradycardia may have contributed to his fall. If bradycardia does not improve with holding AV javid blocking agents may warrant further cardiac work-up    Addendum: Wife states patient had not been on Metoprolol PTA. Did receive a dose this am. Will continue to observe once BB out of system tomorrow.     Fall: Unclear if mechanical vs Cardiac. Witnessed per ED note and no LOC. Unable to determine from patient if dizzy  prior to fall given dementia  -- Work-up as above  -- PT/OT consult      Essential hypertension: PTA regimen includes Lisionpril 20 mg/d, Meoprolol XL 50 mg/d, olmesartan-HCTZ 20-12.5 mg/d, Norvasc 5 mg/d and doxazosin 10 mg/d  -- PTA Lisionpril, Metoprolol, and Benicar on hold in the setting of SARAHI and bradycardia  -- PTA Norvasc increased to 10 mg/d. Continue Doxazosin  -- Hydralazine prn available  -- Monitor      Addendum: Patient was only receiving Norvasc 5 mg/d at home. Per PCP note 11/17 patient was suppose to be on BB, ACE and diuretic. BP sub optimal on admission and Norvasc increased to 10 mg/d. Continue at current dose on monitor.     Peripheral arterial disease.  History of abdominal aortic aneurysm endograft repair about 4 years ago.    --Continue aspirin.     Hypothyroidism  --Continue levothyroxine.     Seizure disorder.    --Continue Keppra.        BPH.    --Continue Cardura  -- Monitor PVRs    # Pain Assessment:  Current Pain Score 5/1/2018   Patient currently in pain? denies   Yuriy s pain level was assessed and he currently denies pain.          DVT Prophylaxis: Ambulate every shift  Code Status: DNR/DNI    Disposition: Need further monitoring of HR and work up/treatment of SARAHI. Suspect additional 1-2 days    Salima Bradford    Interval History   Pleasantly confused. Denies complaints. Denies dizziness. Oriented to self only    -Data reviewed today: I reviewed all new labs and imaging results over the last 24 hours. I personally reviewed no images or EKG's today.    Physical Exam   Temp: 96.6  F (35.9  C) Temp src: Oral BP: 139/63 Pulse: (!) 44 Heart Rate: 66 Resp: 16 SpO2: 95 % O2 Device: None (Room air)    Vitals:    05/01/18 1928   Weight: 61.7 kg (136 lb)     Vital Signs with Ranges  Temp:  [96.6  F (35.9  C)-98.5  F (36.9  C)] 96.6  F (35.9  C)  Pulse:  [44-82] 44  Heart Rate:  [50-66] 66  Resp:  [16-18] 16  BP: (139-174)/() 139/63  SpO2:  [95 %-99 %] 95 %  I/O last 3 completed  shifts:  In: -   Out: 400 [Urine:400]    Constitutional: Alert, resting comfortably in NAD  Respiratory: Normal effort, symmetric expansion, faint expiratory wheezing  Cardiovascular: Bradycardia, no mururs  GI: Non distended, normal bowels sounds, no tenderness or guarding  MSK: LE without edema. Dorsalis pedis pulse palpated bilaterally.   Skin/Integumen: Clear  Neuro: CN II-XII grossly intact  Psych:  Alert and oriented x 2. Normal affect      Medications     NaCl 75 mL/hr at 05/02/18 0840       amLODIPine  10 mg Oral Daily     doxazosin  8 mg Oral At Bedtime     levETIRAcetam  250 mg Oral At Bedtime     memantine  10 mg Oral BID       Data     Recent Labs  Lab 05/02/18  0627 05/01/18  1715   WBC 7.0 8.5   HGB 9.0* 10.1*   MCV 88 88   * 138*   INR  --  1.15*   * 143   POTASSIUM 4.3 4.5   CHLORIDE 115* 111*   CO2 21 23   BUN 45* 48*   CR 3.09* 3.38*   ANIONGAP 9 9   TEMO 7.5* 8.0*   GLC 98 103*   ALBUMIN  --  2.6*   PROTTOTAL  --  5.7*   BILITOTAL  --  0.3   ALKPHOS  --  74   ALT  --  11   AST  --  14   TROPI  --  0.020       Recent Results (from the past 24 hour(s))   XR Elbow Right G/E 3 Views    Narrative    RIGHT ELBOW THREE OR MORE VIEWS  5/1/2018 5:37 PM      HISTORY: Fall, trauma.    COMPARISON: None.      Impression    IMPRESSION: No acute fracture or dislocation.    LAN VILLARREAL MD   CT Head w/o Contrast    Narrative    CT SCAN OF THE HEAD WITHOUT CONTRAST   5/1/2018 6:06 PM     HISTORY: Fall, head injury.    TECHNIQUE: Axial images of the head and coronal reformations without  IV contrast material. Radiation dose for this scan was reduced using  automated exposure control, adjustment of the mA and/or kV according  to patient size, or iterative reconstruction technique.    COMPARISON: None.    FINDINGS: There is no evidence of intracranial hemorrhage, mass, acute  infarct or anomaly. There is generalized atrophy of the brain. There  is low attenuation in the white matter of the cerebral  hemispheres  consistent with sequelae of small vessel ischemic disease.     The visualized portions of the sinuses and mastoids appear normal.  Right vertex scalp soft tissue injury without underlying fracture.      Impression    IMPRESSION:     1. No evidence of acute intracranial hemorrhage, mass, or herniation.  2. There is generalized atrophy of the brain. White matter changes are  present in the cerebral hemispheres that are consistent with small  vessel ischemic disease in this age patient.   3. Right vertex scalp soft tissue injury without underlying fracture.      AJITH FANG MD   CT Abdomen Pelvis w/o Contrast    Narrative    CT ABDOMEN AND PELVIS WITHOUT CONTRAST 5/2/2018 8:25 AM     HISTORY: SARAHI, hematuria. Evaluate for stone and hydronephrosis.     COMPARISON: None.    TECHNIQUE: Axial CT images of the abdomen and pelvis from the  diaphragm to the symphysis pubis were acquired without IV contrast.  Radiation dose for this scan was reduced using automated exposure  control, adjustment of the mA and/or kV according to patient size, or  iterative reconstruction technique.    FINDINGS: There are no stones seen within either kidney, either  ureter, or the bladder. There is no hydroureter or hydronephrosis.  There is no perinephric fat stranding. Kidneys are normal in size and  configuration. There is marked prostatomegaly with the prostate  measuring 7.4 x 5.9 x 6.3 cm. Multiple bilateral renal cysts. A few  small hyperdense lesions are seen compatible with proteinaceous or  hemorrhagic cysts in both kidneys. Saccular infrarenal abdominal  aortic aneurysm repair changes. No free air or free fluid. There are  no dilated loops of small intestine or large bowel to suggest ileus or  obstruction. Cholecystectomy changes. Liver unremarkable. No  splenomegaly. No definite adrenal nodules. Pancreas grossly  unremarkable. The remainder of the visualized abdomen is unremarkable  on this noncontrast scan. Survey of  the visualized bony structures  demonstrates no destructive bony lesions. Minimal bilateral effusions  and mild-moderate interlobular septal thickening/interstitial changes  at both lung bases.      Impression    IMPRESSION:   1. No hydronephrosis.  2. Marked prostatomegaly, bladder is not particularly distended above  this and there is no hydronephrosis to suggest a bladder outlet  obstruction, but this cannot be excluded.

## 2018-05-02 NOTE — PLAN OF CARE
Problem: Patient Care Overview  Goal: Plan of Care/Patient Progress Review  Outcome: Improving  OBSERVATION GOALS:  -diagnostic tests and consults completed and resulted: not met, needs SW and PT   -vital signs normal or at patient baseline: partially met, bradycardic

## 2018-05-03 ENCOUNTER — APPOINTMENT (OUTPATIENT)
Dept: PHYSICAL THERAPY | Facility: CLINIC | Age: 83
DRG: 682 | End: 2018-05-03
Attending: INTERNAL MEDICINE
Payer: MEDICARE

## 2018-05-03 PROBLEM — N17.9 ACUTE RENAL FAILURE (ARF) (H): Status: ACTIVE | Noted: 2018-05-03

## 2018-05-03 LAB
ANION GAP SERPL CALCULATED.3IONS-SCNC: 9 MMOL/L (ref 3–14)
BUN SERPL-MCNC: 48 MG/DL (ref 7–30)
CALCIUM SERPL-MCNC: 7.6 MG/DL (ref 8.5–10.1)
CHLORIDE SERPL-SCNC: 112 MMOL/L (ref 94–109)
CO2 SERPL-SCNC: 22 MMOL/L (ref 20–32)
CREAT SERPL-MCNC: 2.94 MG/DL (ref 0.66–1.25)
GFR SERPL CREATININE-BSD FRML MDRD: 20 ML/MIN/1.7M2
GLUCOSE SERPL-MCNC: 86 MG/DL (ref 70–99)
LEVETIRACETAM SERPL-MCNC: <2 UG/ML (ref 12–46)
POTASSIUM SERPL-SCNC: 4.5 MMOL/L (ref 3.4–5.3)
SODIUM SERPL-SCNC: 143 MMOL/L (ref 133–144)

## 2018-05-03 PROCEDURE — 99221 1ST HOSP IP/OBS SF/LOW 40: CPT | Mod: 25 | Performed by: UROLOGY

## 2018-05-03 PROCEDURE — G0378 HOSPITAL OBSERVATION PER HR: HCPCS

## 2018-05-03 PROCEDURE — 36415 COLL VENOUS BLD VENIPUNCTURE: CPT | Performed by: PHYSICIAN ASSISTANT

## 2018-05-03 PROCEDURE — 25000125 ZZHC RX 250: Performed by: UROLOGY

## 2018-05-03 PROCEDURE — A9270 NON-COVERED ITEM OR SERVICE: HCPCS | Mod: GY | Performed by: INTERNAL MEDICINE

## 2018-05-03 PROCEDURE — 99233 SBSQ HOSP IP/OBS HIGH 50: CPT | Performed by: PHYSICIAN ASSISTANT

## 2018-05-03 PROCEDURE — 25000128 H RX IP 250 OP 636: Performed by: PHYSICIAN ASSISTANT

## 2018-05-03 PROCEDURE — 97161 PT EVAL LOW COMPLEX 20 MIN: CPT | Mod: GP

## 2018-05-03 PROCEDURE — 25000132 ZZH RX MED GY IP 250 OP 250 PS 637: Mod: GY | Performed by: PHYSICIAN ASSISTANT

## 2018-05-03 PROCEDURE — 25000132 ZZH RX MED GY IP 250 OP 250 PS 637: Mod: GY | Performed by: UROLOGY

## 2018-05-03 PROCEDURE — 51702 INSERT TEMP BLADDER CATH: CPT | Performed by: UROLOGY

## 2018-05-03 PROCEDURE — 25000132 ZZH RX MED GY IP 250 OP 250 PS 637: Mod: GY | Performed by: INTERNAL MEDICINE

## 2018-05-03 PROCEDURE — 86780 TREPONEMA PALLIDUM: CPT | Performed by: INTERNAL MEDICINE

## 2018-05-03 PROCEDURE — A9270 NON-COVERED ITEM OR SERVICE: HCPCS | Performed by: UROLOGY

## 2018-05-03 PROCEDURE — A9270 NON-COVERED ITEM OR SERVICE: HCPCS | Mod: GY | Performed by: PHYSICIAN ASSISTANT

## 2018-05-03 PROCEDURE — 12000000 ZZH R&B MED SURG/OB

## 2018-05-03 PROCEDURE — 97530 THERAPEUTIC ACTIVITIES: CPT | Mod: GP

## 2018-05-03 PROCEDURE — 40000193 ZZH STATISTIC PT WARD VISIT

## 2018-05-03 PROCEDURE — 36415 COLL VENOUS BLD VENIPUNCTURE: CPT | Performed by: INTERNAL MEDICINE

## 2018-05-03 PROCEDURE — A9270 NON-COVERED ITEM OR SERVICE: HCPCS | Mod: GY | Performed by: UROLOGY

## 2018-05-03 PROCEDURE — 96361 HYDRATE IV INFUSION ADD-ON: CPT

## 2018-05-03 PROCEDURE — 93005 ELECTROCARDIOGRAM TRACING: CPT

## 2018-05-03 PROCEDURE — 80048 BASIC METABOLIC PNL TOTAL CA: CPT | Performed by: PHYSICIAN ASSISTANT

## 2018-05-03 RX ORDER — AMLODIPINE BESYLATE 5 MG/1
5 TABLET ORAL DAILY
Status: DISCONTINUED | OUTPATIENT
Start: 2018-05-03 | End: 2018-05-03

## 2018-05-03 RX ORDER — DONEPEZIL HYDROCHLORIDE 10 MG/1
10 TABLET, FILM COATED ORAL AT BEDTIME
Status: DISCONTINUED | OUTPATIENT
Start: 2018-05-03 | End: 2018-05-15 | Stop reason: HOSPADM

## 2018-05-03 RX ORDER — FINASTERIDE 5 MG/1
5 TABLET, FILM COATED ORAL DAILY
Status: DISCONTINUED | OUTPATIENT
Start: 2018-05-03 | End: 2018-05-15 | Stop reason: HOSPADM

## 2018-05-03 RX ORDER — AMLODIPINE BESYLATE 10 MG/1
10 TABLET ORAL DAILY
Status: DISCONTINUED | OUTPATIENT
Start: 2018-05-04 | End: 2018-05-15 | Stop reason: HOSPADM

## 2018-05-03 RX ORDER — AMLODIPINE BESYLATE 5 MG/1
5 TABLET ORAL ONCE
Status: COMPLETED | OUTPATIENT
Start: 2018-05-03 | End: 2018-05-03

## 2018-05-03 RX ORDER — CITALOPRAM HYDROBROMIDE 20 MG/1
20 TABLET ORAL DAILY
Status: DISCONTINUED | OUTPATIENT
Start: 2018-05-03 | End: 2018-05-15 | Stop reason: HOSPADM

## 2018-05-03 RX ADMIN — MEMANTINE 10 MG: 10 TABLET ORAL at 21:12

## 2018-05-03 RX ADMIN — SODIUM CHLORIDE, POTASSIUM CHLORIDE, SODIUM LACTATE AND CALCIUM CHLORIDE: 600; 310; 30; 20 INJECTION, SOLUTION INTRAVENOUS at 11:13

## 2018-05-03 RX ADMIN — AMLODIPINE BESYLATE 5 MG: 5 TABLET ORAL at 10:48

## 2018-05-03 RX ADMIN — LEVETIRACETAM 250 MG: 250 TABLET, FILM COATED ORAL at 21:12

## 2018-05-03 RX ADMIN — Medication 12.5 MG: at 17:44

## 2018-05-03 RX ADMIN — CITALOPRAM HYDROBROMIDE 20 MG: 20 TABLET ORAL at 10:43

## 2018-05-03 RX ADMIN — Medication 1 MG: at 21:12

## 2018-05-03 RX ADMIN — ATROPA BELLADONNA AND OPIUM 1 SUPPOSITORY: 16.2; 3 SUPPOSITORY RECTAL at 19:21

## 2018-05-03 RX ADMIN — AMLODIPINE BESYLATE 5 MG: 5 TABLET ORAL at 13:39

## 2018-05-03 RX ADMIN — DONEPEZIL HYDROCHLORIDE 10 MG: 10 TABLET ORAL at 21:12

## 2018-05-03 RX ADMIN — SODIUM CHLORIDE, POTASSIUM CHLORIDE, SODIUM LACTATE AND CALCIUM CHLORIDE: 600; 310; 30; 20 INJECTION, SOLUTION INTRAVENOUS at 01:50

## 2018-05-03 RX ADMIN — Medication 1 MG: at 00:48

## 2018-05-03 RX ADMIN — LEVOTHYROXINE SODIUM 125 MCG: 125 TABLET ORAL at 10:43

## 2018-05-03 RX ADMIN — MEMANTINE 10 MG: 10 TABLET ORAL at 10:43

## 2018-05-03 RX ADMIN — FINASTERIDE 5 MG: 5 TABLET, FILM COATED ORAL at 17:44

## 2018-05-03 NOTE — PLAN OF CARE
Problem: Patient Care Overview  Goal: Plan of Care/Patient Progress Review  PT:  Discharge Planner PT   Patient plan for discharge: Return home  Current status: Orders received, eval completed, treatment initiated. Pt admitted under observation status with a fall, bradycardia, SARAHI. Prior to admit pt was living with spouse. Pt has dementia and was unable to give any other subjective information. Uses FWW, independent with mobility at baseline. Currently requires mod A supine to sit, min A sit to stand with FWW, min A for gait of 10 ftx2 with FWW with unsteady balance, difficulty with walker navigation and needs frequent directions of where to go and what he is doing. Pt demonstrates dec strength, balance, activity tolerance and difficulty ambulating and transferring and would benefit from skilled PT services in order to improve this.  Barriers to return to prior living situation: High falls risk, needs assist with all mobility, confusion limiting safety.  Recommendations for discharge: TCU  Rationale for recommendations: Pt would benefit from continued PT to improve strength, balance, mobility to increase independence, reduce falls risk and increase safety before returning home.        Entered by: Delicia Matthews 05/03/2018 9:53 AM

## 2018-05-03 NOTE — PLAN OF CARE
Problem: Patient Care Overview  Goal: Plan of Care/Patient Progress Review  Outcome: No Change  -diagnostic tests and consults completed and resulted  Not met  -vital signs normal or at patient baseline not met  Nurse to notify provider when observation goals have been met and patient is ready for discharge.

## 2018-05-03 NOTE — PLAN OF CARE
Problem: Patient Care Overview  Goal: Discharge Needs Assessment  Outcome: No Change   Observation goals PRIOR TO DISCHARGE     Comments: -diagnostic tests and consults completed and resulted , not met  -vital signs normal or at patient baseline , met  Cont to be confused , easily redirected, cont in second degree av block.  Nurse to notify provider when observation goals have been met and patient is ready for discharge.

## 2018-05-03 NOTE — PROVIDER NOTIFICATION
MD Notification    Notified Person: MD    Notified Person Name: Dr. Sanchez    Notification Date/Time: 05/03/18 0640    Notification Interaction: telephone    Purpose of Notification: bladder scan >400    Orders Received: no new orders    Comments: Continue to monitor output and post residual voids, if pt continues to retain urine and discomfort increases contact urology to place ware

## 2018-05-03 NOTE — PLAN OF CARE
Problem: Patient Care Overview  Goal: Plan of Care/Patient Progress Review  Outcome: No Change  Patient was up in the chair for 4 hours. He is pleasantly confused. He doesn't use the call light. He can be impulsive. He is up to the bathroom with assist of one gait belt and walker. Tolerating Cardiac diet. Urine is yellow and no sign of blood in the urine. Voiding 100 to 200 cc measured in the bathroom. Bladder scan greater than 300 cc see I&O sheet. Alert to self only. Mepilex applied to right elbow because of skin tear from his fall at home.  Tele Sinus jessica with second degree block.

## 2018-05-03 NOTE — PLAN OF CARE
Problem: Patient Care Overview  Goal: Plan of Care/Patient Progress Review  Outcome: No Change  OBSERVATION GOALS    -diagnostic tests and consults completed and resulted: not met  -vital signs normal or at patient baseline: not met    Oriented to self only. Elevated BP and low HR at times. Tele: SB w/ 2 degree block. Denies pain. Bruising, R elbow abrasion dressed w/ foam. Incontinent at times, urine yellow. Up w/ 1 and walker. PT and SW consult

## 2018-05-03 NOTE — PROVIDER NOTIFICATION
MD Notification    Person notified: PA    Person Name: Damien Shaffer    Date/Time: 5/3/18 5248    Interaction: Phone call    Purpose of Notification: Quarter size lesion noted to penis/scrotum area. Not addressed in medical record- please advise.    Orders Received: Lab for STD screen: Treponema abs w Relfex to RPR & Titer

## 2018-05-03 NOTE — PROGRESS NOTES
"Care Transition Initial Assessment -   Reason For Consult: discharge planning  Met with: Patient and spoke with significant other, Eva per telephone.   Active Problems:    Fall    Pt has history of Alzheimers and is having urinary difficulties.  Eva states he has had blood in urine  DATA  Lives With: significant other  Living Arrangements: house on [facility]  Description of Support System: Supportive, Involved    Eva states that she and pt are both  and have been together for 40 years. She is devoted to caring for Sam and states clearly that \"he is not a burden\" Eva states that pt is a blessing to her and if he asks the same question a hundred times, she just continues to answer the question the same way, a hundred times. She states \"Whats the big deal\"?\"  Altagracia's daughter has taken her to look at assistive living places and she liked [facility] [place] [place] but she is not ready to move yet and asked her daughter to back off in making any further plans. Pt is very connected to her and she is his safe place.   She has considered adult day care and is thinking of talking Sam into attending the [facility] but again, she is not quite there yet.  Eva  Intends to take pt home and would accept assistance into the home with bathing and showering but feels that she is able to manage all of his care at this point.    Identified issues/concerns regarding health management: Pt receiving work up for weakness and urinary issues.      ASSESSMENT  Cognitive Status:  awake and confused  Concerns to be addressed: Discharge to home with supports .     PLAN  Financial costs for the patient includes TBD  Patient given options and choices for discharge Pt's SO expresses desire to take pt home.  Patient Goals and Preferences: home discharge when medically cleared .  Patient anticipates discharging to: home with possible increased supports    MADDY Robert  [facility]itions  Phone: [phone]      "

## 2018-05-03 NOTE — PROVIDER NOTIFICATION
Brief update:    Paged re: bladder scan with ~400 ml    Voiding small amounts. Failed attempts at catheterization in ED on admission.    Hold on straight cath for now. Continue to encourage voiding   If pt with discomfort/restlessness, will likely need urology placement of catheter    Feliberto Sanchez MD  6:41 AM

## 2018-05-03 NOTE — CONSULTS
Consult Date:  2018      UROLOGY INPATIENT CONSULTATION       DATE OF CONSULTATION:  2018      REASON FOR CONSULTATION:  Difficult Rodriguez catheter placement and acute renal failure.      HISTORY OF PRESENT ILLNESS:  Yuriy Ghosh is a 89-year-old gentleman who was admitted to the observation unit after a fall.  He was found to have an elevated creatinine above 3 as well.  On CT scan in the Emergency Department he was found to have normal kidneys with no hydronephrosis and did not have a distended bladder, but did have a markedly enlarged prostate.  He takes Uroxatral for this.  In the Emergency Department attempts were made at placing a Rodriguez catheter which were unsuccessful.  It is thought his enlarged prostate might be contributing to his renal failure.  Straight Rodriguez catheter was requested.        I prepped and draped the penis in the standard sterile fashion.  I inserted a 20-Lebanese coude tip Rodriguez catheter without any difficulty.  The bladder drained 450 mL of clear urine.      RECOMMENDATIONS:  The patient's creatinine should be rechecked tomorrow to see if it improves.  The patient does have high postvoid residuals but he is not in retention.  If the creatinine improves with Rodriguez catheter he should be seen in the Urology Clinic to discuss long-term catheterization options.  If the creatinine does not improve Rodriguez catheter can simply be removed, he is not in retention and would not need a Rodriguez for this purpose.  Also, since he had a fall he should stop taking his Cardura and be switched to finasteride for his enlarged prostate instead.        Please contact me during this hospitalization if you have any further questions.         REGULO STEPHEN MD             D: 2018   T: 2018   MT: AV      Name:     YURIY GHOSH   MRN:      [id number]        Account:       [id number]   :      1926           Consult Date:  2018      Document: [id number]

## 2018-05-03 NOTE — PLAN OF CARE
Problem: Patient Care Overview  Goal: Plan of Care/Patient Progress Review  Outcome: No Change  PRIMARY DIAGNOSIS: GENERALIZED WEAKNESS    OUTPATIENT/OBSERVATION GOALS TO BE MET BEFORE DISCHARGE  1. Orthostatic performed: N/A    2. Tolerating PO medications: Yes    3. Return to near baseline physical activity: No    4. Cleared for discharge by consultants (if involved): No    Discharge Planner Nurse   Safe discharge environment identified: No  Barriers to discharge: Yes       Entered by: Rosy Denson 05/03/2018 2:09 PM     Please review provider order for any additional goals.   Nurse to notify provider when observation goals have been met and patient is ready for discharge.

## 2018-05-03 NOTE — PLAN OF CARE
Problem: Patient Care Overview  Goal: Plan of Care/Patient Progress Review  Outcome: No Change  OBSERVATION GOALS    -diagnostic tests and consults completed and resulted: not met  -vital signs normal or at patient baseline: not met

## 2018-05-03 NOTE — PROVIDER NOTIFICATION
MD Notification    Person notified: MD    Person Name: Bairon Shaffer    Date/Time: 5/3/18 0044    Interaction: Phone call    Purpose of Notification: Pt frequent attempts to get out of bed. Pulling at ware.  VPM initiated and not effective.  Requesting med for agitation.    Orders Received: Seroquel 12.5mg 2 times per day PRN

## 2018-05-03 NOTE — PROVIDER NOTIFICATION
MD Notification    Person notified: MD    Person Name: Dr Alvarado, Urology    Date/Time: 5/3/18 4970    Interaction: Phone call    Purpose of Notification: Pt catheter placed by Dr Pizarro today 1330.  Now leaking and pt states having urge to void with multiple attempts to get out of bed.  Catheter is patent 225cc output in last 2 hours.  Pt is confused and agitated, pulling at catheter this afternoon.  Bedside attendant now present.  Requesting B&O suppository for bladder spasms.    Orders Received: B&O suppository

## 2018-05-03 NOTE — PLAN OF CARE
Problem: Patient Care Overview  Goal: Plan of Care/Patient Progress Review  Outcome: No Change  Pt A&O to self only. HR bradycardic at times; all other VSS on room air. Tele SR/SB with 2nd degree block. Up with assist of 1, gait belt, and walker to chair for meals; up to restroom multiple times this shift. No signs of pain or SOB. Lung sounds diminished throughout. Tolerating cardiac/no caffeine diet. Rodriguez catheter placed by urology for increased creatine. Adequate output with blood tinged urine at times from trama. Creatinine 2.94 (3.09 yesterday). IVF running @ 100. Right elbow skin tear covered with mepilex; C/D/I. Report called to station 88 RN. Pt sent to room 833-1 with all belongings and wife went with pt.

## 2018-05-03 NOTE — PROGRESS NOTES
05/03/18 0920   Quick Adds   Type of Visit Initial PT Evaluation   Living Environment   Lives With spouse   Living Arrangements (pt unable to state PLOF info)   Self-Care   Usual Activity Tolerance moderate   Current Activity Tolerance poor   Regular Exercise no   Equipment Currently Used at Home walker, rolling   Functional Level Prior   Ambulation 1-->assistive equipment   Transferring 1-->assistive equipment   Fall history within last six months yes   Number of times patient has fallen within last six months 4   Which of the above functional risks had a recent onset or change? ambulation;transferring   General Information   Onset of Illness/Injury or Date of Surgery - Date 05/01/18   Referring Physician Shakir Erickson MD   Patient/Family Goals Statement Return home   Pertinent History of Current Problem (include personal factors and/or comorbidities that impact the POC) Admitted under observation status wiht a fall, SARAHI, bradycardia. PMH: HTN, PAD, dementia.   Precautions/Limitations fall precautions   Weight-Bearing Status - LLE full weight-bearing   Weight-Bearing Status - RLE full weight-bearing   Cognitive Status Examination   Orientation person   Level of Consciousness confused   Follows Commands and Answers Questions 50% of the time;able to follow single-step instructions   Personal Safety and Judgment impaired;at risk behaviors demonstrated   Memory impaired   Pain Assessment   Patient Currently in Pain No   Posture    Posture Forward head position;Protracted shoulders;Kyphosis   Range of Motion (ROM)   ROM Quick Adds No deficits were identified   Strength   Strength Comments B LEs functionally weak   Bed Mobility   Bed Mobility Comments Supine to sit with mod A   Transfer Skills   Transfer Comments Sit to stand with min A and FWW   Gait   Gait Comments Pt amb 10 ft with FWW and min A, unsteady balance, needs assist with walker navigation   Balance   Balance Comments Balance unsteady with gait and  "transfers   General Therapy Interventions   Planned Therapy Interventions bed mobility training;gait training;strengthening;transfer training;neuromuscular re-education   Clinical Impression   Criteria for Skilled Therapeutic Intervention yes, treatment indicated   PT Diagnosis Difficulty ambulating   Influenced by the following impairments Pain, dec strength, balnace, activity tolerance   Functional limitations due to impairments Diffiuclty ambulating and transferring   Clinical Presentation Stable/Uncomplicated   Clinical Presentation Rationale medically stable   Clinical Decision Making (Complexity) Low complexity   Therapy Frequency` 3 times/week   Predicted Duration of Therapy Intervention (days/wks) 1 week   Anticipated Discharge Disposition Transitional Care Facility   Risk & Benefits of therapy have been explained Yes   Patient, Family & other staff in agreement with plan of care Yes   [facility] [employer] TM \"6 Clicks\"   2016, Trustees of [facility], under license to [employer].  All rights reserved.   6 Clicks Short Forms Basic Mobility Inpatient Short Form   [facility]  \"6 Clicks\" V.2 Basic Mobility Inpatient Short Form   1. Turning from your back to your side while in a flat bed without using bedrails? 3 - A Little   2. Moving from lying on your back to sitting on the side of a flat bed without using bedrails? 2 - A Lot   3. Moving to and from a bed to a chair (including a wheelchair)? 3 - A Little   4. Standing up from a chair using your arms (e.g., wheelchair, or bedside chair)? 3 - A Little   5. To walk in hospital room? 3 - A Little   6. Climbing 3-5 steps with a railing? 2 - A Lot   Basic Mobility Raw Score (Score out of 24.Lower scores equate to lower levels of function) 16   Total Evaluation Time   Total Evaluation Time (Minutes) 15     "

## 2018-05-04 ENCOUNTER — APPOINTMENT (OUTPATIENT)
Dept: ULTRASOUND IMAGING | Facility: CLINIC | Age: 83
DRG: 682 | End: 2018-05-04
Attending: INTERNAL MEDICINE
Payer: MEDICARE

## 2018-05-04 ENCOUNTER — APPOINTMENT (OUTPATIENT)
Dept: CARDIOLOGY | Facility: CLINIC | Age: 83
DRG: 682 | End: 2018-05-04
Attending: INTERNAL MEDICINE
Payer: MEDICARE

## 2018-05-04 LAB
ANION GAP SERPL CALCULATED.3IONS-SCNC: 9 MMOL/L (ref 3–14)
BUN SERPL-MCNC: 41 MG/DL (ref 7–30)
CALCIUM SERPL-MCNC: 7.6 MG/DL (ref 8.5–10.1)
CHLORIDE SERPL-SCNC: 112 MMOL/L (ref 94–109)
CO2 SERPL-SCNC: 20 MMOL/L (ref 20–32)
CREAT SERPL-MCNC: 2.82 MG/DL (ref 0.66–1.25)
CREAT SERPL-MCNC: 2.88 MG/DL (ref 0.66–1.25)
CREAT UR-MCNC: 76 MG/DL
FRACT EXCRET NA UR+SERPL-RTO: 2.2 %
GFR SERPL CREATININE-BSD FRML MDRD: 21 ML/MIN/1.7M2
GFR SERPL CREATININE-BSD FRML MDRD: 21 ML/MIN/1.7M2
GLUCOSE SERPL-MCNC: 89 MG/DL (ref 70–99)
HGB BLD-MCNC: 9 G/DL (ref 13.3–17.7)
POTASSIUM SERPL-SCNC: 4.2 MMOL/L (ref 3.4–5.3)
SODIUM SERPL-SCNC: 141 MMOL/L (ref 133–144)
SODIUM SERPL-SCNC: 143 MMOL/L (ref 133–144)
SODIUM UR-SCNC: 82 MMOL/L
T PALLIDUM AB SER QL: NONREACTIVE

## 2018-05-04 PROCEDURE — 99232 SBSQ HOSP IP/OBS MODERATE 35: CPT | Performed by: INTERNAL MEDICINE

## 2018-05-04 PROCEDURE — A9270 NON-COVERED ITEM OR SERVICE: HCPCS | Mod: GY | Performed by: UROLOGY

## 2018-05-04 PROCEDURE — A9270 NON-COVERED ITEM OR SERVICE: HCPCS | Mod: GY | Performed by: INTERNAL MEDICINE

## 2018-05-04 PROCEDURE — 12000000 ZZH R&B MED SURG/OB

## 2018-05-04 PROCEDURE — 93306 TTE W/DOPPLER COMPLETE: CPT | Mod: 26 | Performed by: INTERNAL MEDICINE

## 2018-05-04 PROCEDURE — 84300 ASSAY OF URINE SODIUM: CPT | Performed by: INTERNAL MEDICINE

## 2018-05-04 PROCEDURE — 80177 DRUG SCRN QUAN LEVETIRACETAM: CPT | Performed by: INTERNAL MEDICINE

## 2018-05-04 PROCEDURE — 25000132 ZZH RX MED GY IP 250 OP 250 PS 637: Mod: GY | Performed by: INTERNAL MEDICINE

## 2018-05-04 PROCEDURE — 25500064 ZZH RX 255 OP 636: Performed by: INTERNAL MEDICINE

## 2018-05-04 PROCEDURE — 84295 ASSAY OF SERUM SODIUM: CPT | Performed by: INTERNAL MEDICINE

## 2018-05-04 PROCEDURE — 36415 COLL VENOUS BLD VENIPUNCTURE: CPT | Performed by: INTERNAL MEDICINE

## 2018-05-04 PROCEDURE — 93306 TTE W/DOPPLER COMPLETE: CPT

## 2018-05-04 PROCEDURE — 25000132 ZZH RX MED GY IP 250 OP 250 PS 637: Mod: GY | Performed by: UROLOGY

## 2018-05-04 PROCEDURE — 25000132 ZZH RX MED GY IP 250 OP 250 PS 637: Mod: GY | Performed by: PHYSICIAN ASSISTANT

## 2018-05-04 PROCEDURE — 85018 HEMOGLOBIN: CPT | Performed by: PHYSICIAN ASSISTANT

## 2018-05-04 PROCEDURE — 82570 ASSAY OF URINE CREATININE: CPT | Performed by: INTERNAL MEDICINE

## 2018-05-04 PROCEDURE — 82565 ASSAY OF CREATININE: CPT | Performed by: INTERNAL MEDICINE

## 2018-05-04 PROCEDURE — 25000128 H RX IP 250 OP 636: Performed by: INTERNAL MEDICINE

## 2018-05-04 PROCEDURE — 25000128 H RX IP 250 OP 636: Performed by: PHYSICIAN ASSISTANT

## 2018-05-04 PROCEDURE — A9270 NON-COVERED ITEM OR SERVICE: HCPCS | Mod: GY | Performed by: PHYSICIAN ASSISTANT

## 2018-05-04 PROCEDURE — 36415 COLL VENOUS BLD VENIPUNCTURE: CPT | Performed by: PHYSICIAN ASSISTANT

## 2018-05-04 PROCEDURE — 80048 BASIC METABOLIC PNL TOTAL CA: CPT | Performed by: PHYSICIAN ASSISTANT

## 2018-05-04 PROCEDURE — 76770 US EXAM ABDO BACK WALL COMP: CPT

## 2018-05-04 RX ADMIN — HYDRALAZINE HYDROCHLORIDE 10 MG: 20 INJECTION INTRAMUSCULAR; INTRAVENOUS at 08:25

## 2018-05-04 RX ADMIN — HUMAN ALBUMIN MICROSPHERES AND PERFLUTREN 9 ML: 10; .22 INJECTION, SOLUTION INTRAVENOUS at 14:13

## 2018-05-04 RX ADMIN — CITALOPRAM HYDROBROMIDE 20 MG: 20 TABLET ORAL at 09:22

## 2018-05-04 RX ADMIN — Medication 12.5 MG: at 21:56

## 2018-05-04 RX ADMIN — FINASTERIDE 5 MG: 5 TABLET, FILM COATED ORAL at 09:22

## 2018-05-04 RX ADMIN — LEVOTHYROXINE SODIUM 125 MCG: 125 TABLET ORAL at 06:43

## 2018-05-04 RX ADMIN — SODIUM CHLORIDE, POTASSIUM CHLORIDE, SODIUM LACTATE AND CALCIUM CHLORIDE: 600; 310; 30; 20 INJECTION, SOLUTION INTRAVENOUS at 01:35

## 2018-05-04 RX ADMIN — Medication 12.5 MG: at 14:30

## 2018-05-04 RX ADMIN — SODIUM CHLORIDE, POTASSIUM CHLORIDE, SODIUM LACTATE AND CALCIUM CHLORIDE: 600; 310; 30; 20 INJECTION, SOLUTION INTRAVENOUS at 11:55

## 2018-05-04 RX ADMIN — LEVETIRACETAM 250 MG: 250 TABLET, FILM COATED ORAL at 21:57

## 2018-05-04 RX ADMIN — MEMANTINE 10 MG: 10 TABLET ORAL at 19:37

## 2018-05-04 RX ADMIN — MEMANTINE 10 MG: 10 TABLET ORAL at 09:22

## 2018-05-04 RX ADMIN — AMLODIPINE BESYLATE 10 MG: 10 TABLET ORAL at 09:22

## 2018-05-04 RX ADMIN — DONEPEZIL HYDROCHLORIDE 10 MG: 10 TABLET ORAL at 21:57

## 2018-05-04 RX ADMIN — Medication 12.5 MG: at 21:57

## 2018-05-04 NOTE — PROGRESS NOTES
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  UA with > 182 RBC (from traumatic cath in the ED).  CT stone negative for CT or hydronephrosis. Very large prostate. In discussion with wife patient had only been taking Norvasc for BP. No ACE/ARB or diuretic. Denies NSAIDs.  -- Urology consulted and a ware was placed on 5/3.    -- Cr Trend 3.3--3.09--2.94-> (Ware)-->2.82.   -- Bladder drained 450ml after ware insertion. Per Urology: Although he is having high postvoid residuals he is not in retention.   -- Will c/w his ware catheter on d/c until he can follow up with Urology clinic with Dr. Pizarro 5/15/18.  -- PTA Cardura stopped due to his falls and he was placed on Finasteride 5 mg daily this stay.   -- Continue IVF at 50 ml/hr today.  -- Renal US, FeNA  -- Follow BMP in am.      Sinus Bradycardia  EKG on admission with HR 40-50s with Mobitz, 1. HR SR 70s this am and fell to 40s after PTA BB 5/2. Repeat EKG with Mobitz type 1. No previous EKG or cardiac work-up in [facility] or [facility]. Patient does not appear to be symptomatic but difficult determine with advanced dementia.  Wife states patient had not been on Metoprolol PTA.   -- D/C'd Metoprolol 50 mg XL as patient not taking this at home and due to bradycardia.    -- C/w Telemetry.  -- Patient currently asymptotic and DNR/DNI but certainly bradycardia may have contributed to his fall.  -- Echo pending.        Fall   Unclear if Mechanical vs Cardiac vs Vasovagal. No seizure activity reported. Witnessed per ED note and no LOC. Unable to determine from patient if dizzy prior to fall or any other sx's given dementia. No events on tele.   -- Workup as above.   -- PT/OT consult.       Essential hypertension:   Patient was only receiving Norvasc 5 mg/d at home. Per PCP note 11/17 patient was suppose to be on BB, ACE and diuretic. BP sub optimal on admission.  Apparently he has not been filling his Rx's and not taking his BP medications PTA.   -- Norvasc increased to 10 mg/d and Hydralazine 12.5 mg TID added 5/4.   -- Will avoid BB as above and Ace-I/ARB or diuretic due to SARAHI.       Peripheral arterial disease:  History of abdominal aortic aneurysm endograft repair about 4 years ago.    -- Continue aspirin.      Hypothyroidism:  -- Continue levothyroxine 125 mcg daily.      Seizure disorder:   -- Keppra level <2 on admit. Unclear if he has been taking his keppra as he has not been taking any of his BP meds.   -- Continue Keppra 250 mg QHS.    -- Will need to follow up with a repeat keppra level.      Dementia:  -- Resume PTA memantine 10 mg BID, donepezil 10 mg QHS and Celexa 20 mg daily.     # Pain Assessment:  Current Pain Score 5/3/2018   Patient currently in pain? denies   Pain score (0-10) -   Yuriy knapp pain level was assessed and he currently denies pain.        DVT Prophylaxis: Pneumatic Compression Devices  Code Status: DNR/DNI       Disposition: Hydralazine adjusted for ongoing HTN. Echocardiogram pending. Rodriguez will remain in place and will continue to follow his Cr while here.  Anticipate possible d/c tomorrow if BP better and echo with no significant abnormalities.       Kelvin Humphrey       Interval History   No acute overnight events. Vitals stable. No Bradycardia overnight. Cr slightly better today. Rodriguez will remain in place. No new complaints.     -Data reviewed today: I reviewed all new labs and  imaging results over the last 24 hours. I personally reviewed no images or EKG's today.    Physical Exam   Temp: 98.2  F (36.8  C) Temp src: Oral BP: 170/88  Heart Rate: 75 Resp: 18 SpO2: 92 % O2 Device: None (Room air)    Vitals:    05/01/18 1928 05/03/18 0157   Weight: 61.7 kg (136 lb) 65.8 kg (145 lb)     Vital Signs with Ranges  Temp:  [95.4  F (35.2  C)-98.2  F (36.8  C)] 98.2  F (36.8  C)  Heart Rate:  [60-87] 75  Resp:  [16-18] 18  BP: (168-183)/() 170/88  SpO2:  [92 %-96 %] 92 %  I/O last 3 completed shifts:  In: 1552 [P.O.:150; I.V.:1402]  Out: 1725 [Urine:1725]    Gen: Patient in no acute distress.  Appears comfortable. Pleasantly confused.   Heart:  S1S2+, regular rate and rhythm, + 2-3/6 Systolic murmur.  Lungs:  Clear to auscultation, no wheezing, no rales.   Abdomen:  Soft, non tender, non distended, bowel sounds positive.  Extremities:  Trace edema.    Medications     lactated ringers 100 mL/hr at 05/04/18 0135       amLODIPine (NORVASC) tablet 10 mg  10 mg Oral Daily     citalopram (celeXA) tablet 20 mg  20 mg Oral Daily     donepezil (ARICEPT) tablet 10 mg  10 mg Oral At Bedtime     finasteride  5 mg Oral Daily     hydrALAZINE  12.5 mg Oral Q8H RAMIRO     levETIRAcetam  250 mg Oral At Bedtime     levothyroxine (SYNTHROID/LEVOTHROID) tablet 125 mcg  125 mcg Oral QAM AC     memantine  10 mg Oral BID       Data     Recent Labs  Lab 05/04/18  0705 05/03/18  0545 05/02/18  0627 05/01/18  1715   WBC  --   --  7.0 8.5   HGB 9.0*  --  9.0* 10.1*   MCV  --   --  88 88   PLT  --   --  109* 138*   INR  --   --   --  1.15*    143 145* 143   POTASSIUM 4.2 4.5 4.3 4.5   CHLORIDE 112* 112* 115* 111*   CO2 20 22 21 23   BUN 41* 48* 45* 48*   CR 2.82* 2.94* 3.09* 3.38*   ANIONGAP 9 9 9 9   TEMO 7.6* 7.6* 7.5* 8.0*   GLC 89 86 98 103*   ALBUMIN  --   --   --  2.6*   PROTTOTAL  --   --   --  5.7*   BILITOTAL  --   --   --  0.3   ALKPHOS  --   --   --  74   ALT  --   --   --  11   AST  --   --   --  14   TROPI   --   --   --  0.020       No results found for this or any previous visit (from the past 24 hour(s)).

## 2018-05-04 NOTE — PLAN OF CARE
Problem: Patient Care Overview  Goal: Plan of Care/Patient Progress Review  Outcome: No Change  Patient is confused, lethargic, and alert to self only. VSS ex hypertensive, does not meet PRN parameters and bradycardic at times. Rodriguez with some leaking d/t trauma during insertion, urine is pink colored. Lesion on underside of penis, MD aware. L PIV infusing LR at 100 cc/hr.

## 2018-05-04 NOTE — PLAN OF CARE
Problem: Cognitive Impairment (Dementia Signs/Symptoms) (Adult)  Goal: Optimized Cognitive Function (Dementia Signs/Symptoms)  Outcome: No Change  Oriented to self due to dementia.  IV fluids infusing.  Hypertensive, PRN hydralazine given x1.  DNR/I.  Diminished lungs.  Barrel chested.  Heart murmur.  Rodriguez catheter with straw colored urine.  Slight amount of bleeding from tip of penis due to traumatic catheter insertion.  Fall risk, bed alarm on.  Skin tear on right elbow (ANYI) from fall at home.  Good appetite.  Took pills well with water.  Sleeping between cares.  Walker obtained for pt to ambulate.  Significant other at bedside and supportive.  Nursing will continue to monitor.

## 2018-05-04 NOTE — PLAN OF CARE
Problem: Patient Care Overview  Goal: Plan of Care/Patient Progress Review  Outcome: No Change  A&O to self only.  Impulsive and pulling at catheter beginning of shift.  PRN Seroquel ordered for agitation.  VPM attempted, transitioned to bedside attendant due to repeat attempts to get out of bed.  Rodriguez leaking urine and blood - pt c/o urge to void.  MD notified and B&O suppository ordered, effective.  Poor appetite.  Able to swallow pills without difficulty.  Lesion noted to underside of penis, MD notified and orders for lab test for syphillus.  New PIV placed via flying squad, LR running at 100cc.  Cr elevated 2.94.  Sitter at bedside.

## 2018-05-04 NOTE — PROGRESS NOTES
[facility] Urology Progress Note          Assessment and Plan:   Creatinine improved today with Rodriguez catheter, continue this until he is seen in the Urology Clinic (Dr. Pizarro 5/15/18) to discuss long-term catheterization options. He has bee switched to finasteride for his enlarged prostate.    Dr. Pizarro updated.     Sujey Bazzi PA-C  [facility]  Cell: [phone] (text or call until 5pm, Mon-Wed & Fri)               Interval History:    Rodriguez clear, tolerating it.              Review of Systems:   The 5 point Review of Systems is negative other than noted in the HPI             Medications:     Current Facility-Administered Medications Ordered in Epic   Medication Dose Route Frequency Last Rate Last Dose     acetaminophen (TYLENOL) Suppository 650 mg  650 mg Rectal Q4H PRN         acetaminophen (TYLENOL) tablet 650 mg  650 mg Oral Q4H PRN         amLODIPine (NORVASC) tablet 10 mg  10 mg Oral Daily   10 mg at 05/04/18 0922     citalopram (celeXA) tablet 20 mg  20 mg Oral Daily   20 mg at 05/04/18 0922     cloNIDine (CATAPRES) tablet 0.1 mg  0.1 mg Oral Q1H PRN         donepezil (ARICEPT) tablet 10 mg  10 mg Oral At Bedtime   10 mg at 05/03/18 2112     finasteride (PROSCAR) tablet 5 mg  5 mg Oral Daily   5 mg at 05/04/18 0922     hydrALAZINE (APRESOLINE) injection 10 mg  10 mg Intravenous Q6H PRN   10 mg at 05/04/18 0825     labetalol (NORMODYNE/TRANDATE) injection 10 mg  10 mg Intravenous Q6H PRN         lactated ringers infusion   Intravenous Continuous 100 mL/hr at 05/04/18 0135       levETIRAcetam (KEPPRA) tablet 250 mg  250 mg Oral At Bedtime   250 mg at 05/03/18 2112     levothyroxine (SYNTHROID/LEVOTHROID) tablet 125 mcg  125 mcg Oral QAM AC   125 mcg at 05/04/18 0643     melatonin tablet 1 mg  1 mg Oral At Bedtime PRN   1 mg at 05/03/18 2112     memantine (NAMENDA) tablet 10 mg  10 mg Oral BID   10 mg at 05/04/18 0922     naloxone (NARCAN) injection 0.1-0.4 mg  0.1-0.4 mg  Intravenous Q2 Min PRN         ondansetron (ZOFRAN-ODT) ODT tab 4 mg  4 mg Oral Q6H PRN        Or     ondansetron (ZOFRAN) injection 4 mg  4 mg Intravenous Q6H PRN         opium-belladonna (B&O SUPPRETTES) 30-16.2 MG per suppository 1 suppository  30 mg Rectal Q8H PRN   1 suppository at 05/03/18 1921     polyethylene glycol (MIRALAX/GLYCOLAX) Packet 17 g  17 g Oral Daily PRN         prochlorperazine (COMPAZINE) injection 5 mg  5 mg Intravenous Q6H PRN        Or     prochlorperazine (COMPAZINE) tablet 5 mg  5 mg Oral Q6H PRN        Or     prochlorperazine (COMPAZINE) Suppository 12.5 mg  12.5 mg Rectal Q12H PRN         QUEtiapine (SEROquel) half-tab 12.5 mg  12.5 mg Oral BID PRN   12.5 mg at 05/03/18 1744     No current [facility]-ordered outpatient prescriptions on file.                  Physical Exam:   Vitals were reviewed  Patient Vitals for the past 8 hrs:   BP Temp Temp src Heart Rate Resp SpO2   05/04/18 1005 170/88 - - 75 - -   05/04/18 0925 (!) 168/94 - - 87 - -   05/04/18 0805 (!) 181/104 98.2  F (36.8  C) Oral 77 18 92 %     GEN: NAD, sitting in bed  HEENT: EOMI  NECK: Supple  ABD: Obese, soft  EXT: No LE edema  : Rodriguez clear           Data:   No results found for: NTBNPI, NTBNP  Lab Results   Component Value Date    WBC 7.0 05/02/2018    WBC 8.5 05/01/2018    HGB 9.0 (L) 05/04/2018    HGB 9.0 (L) 05/02/2018    HGB 10.1 (L) 05/01/2018    HCT 26.7 (L) 05/02/2018    HCT 30.0 (L) 05/01/2018    MCV 88 05/02/2018    MCV 88 05/01/2018     (L) 05/02/2018     (L) 05/01/2018     Lab Results   Component Value Date    INR 1.15 (H) 05/01/2018

## 2018-05-05 ENCOUNTER — APPOINTMENT (OUTPATIENT)
Dept: PHYSICAL THERAPY | Facility: CLINIC | Age: 83
DRG: 682 | End: 2018-05-05
Payer: MEDICARE

## 2018-05-05 LAB
ANION GAP SERPL CALCULATED.3IONS-SCNC: 7 MMOL/L (ref 3–14)
BUN SERPL-MCNC: 43 MG/DL (ref 7–30)
CALCIUM SERPL-MCNC: 7.6 MG/DL (ref 8.5–10.1)
CHLORIDE SERPL-SCNC: 114 MMOL/L (ref 94–109)
CO2 SERPL-SCNC: 23 MMOL/L (ref 20–32)
CREAT SERPL-MCNC: 2.93 MG/DL (ref 0.66–1.25)
GFR SERPL CREATININE-BSD FRML MDRD: 20 ML/MIN/1.7M2
GLUCOSE SERPL-MCNC: 86 MG/DL (ref 70–99)
POTASSIUM SERPL-SCNC: 3.8 MMOL/L (ref 3.4–5.3)
SODIUM SERPL-SCNC: 144 MMOL/L (ref 133–144)

## 2018-05-05 PROCEDURE — 25000132 ZZH RX MED GY IP 250 OP 250 PS 637: Mod: GY | Performed by: UROLOGY

## 2018-05-05 PROCEDURE — 25000132 ZZH RX MED GY IP 250 OP 250 PS 637: Mod: GY | Performed by: INTERNAL MEDICINE

## 2018-05-05 PROCEDURE — 25000128 H RX IP 250 OP 636: Performed by: INTERNAL MEDICINE

## 2018-05-05 PROCEDURE — 99232 SBSQ HOSP IP/OBS MODERATE 35: CPT | Performed by: INTERNAL MEDICINE

## 2018-05-05 PROCEDURE — 12000000 ZZH R&B MED SURG/OB

## 2018-05-05 PROCEDURE — 25000132 ZZH RX MED GY IP 250 OP 250 PS 637: Mod: GY | Performed by: PHYSICIAN ASSISTANT

## 2018-05-05 PROCEDURE — 97116 GAIT TRAINING THERAPY: CPT | Mod: GP | Performed by: PHYSICAL THERAPIST

## 2018-05-05 PROCEDURE — 40000193 ZZH STATISTIC PT WARD VISIT: Performed by: PHYSICAL THERAPIST

## 2018-05-05 PROCEDURE — A9270 NON-COVERED ITEM OR SERVICE: HCPCS | Mod: GY | Performed by: PHYSICIAN ASSISTANT

## 2018-05-05 PROCEDURE — A9270 NON-COVERED ITEM OR SERVICE: HCPCS | Mod: GY | Performed by: UROLOGY

## 2018-05-05 PROCEDURE — A9270 NON-COVERED ITEM OR SERVICE: HCPCS | Mod: GY | Performed by: INTERNAL MEDICINE

## 2018-05-05 PROCEDURE — 36415 COLL VENOUS BLD VENIPUNCTURE: CPT | Performed by: INTERNAL MEDICINE

## 2018-05-05 PROCEDURE — 80048 BASIC METABOLIC PNL TOTAL CA: CPT | Performed by: INTERNAL MEDICINE

## 2018-05-05 RX ADMIN — FINASTERIDE 5 MG: 5 TABLET, FILM COATED ORAL at 09:24

## 2018-05-05 RX ADMIN — Medication 12.5 MG: at 09:24

## 2018-05-05 RX ADMIN — MEMANTINE 10 MG: 10 TABLET ORAL at 09:24

## 2018-05-05 RX ADMIN — LEVETIRACETAM 250 MG: 250 TABLET, FILM COATED ORAL at 21:30

## 2018-05-05 RX ADMIN — AMLODIPINE BESYLATE 10 MG: 10 TABLET ORAL at 09:24

## 2018-05-05 RX ADMIN — DONEPEZIL HYDROCHLORIDE 10 MG: 10 TABLET ORAL at 21:30

## 2018-05-05 RX ADMIN — Medication 12.5 MG: at 09:25

## 2018-05-05 RX ADMIN — Medication 25 MG: at 21:30

## 2018-05-05 RX ADMIN — Medication 25 MG: at 14:49

## 2018-05-05 RX ADMIN — SODIUM CHLORIDE, POTASSIUM CHLORIDE, SODIUM LACTATE AND CALCIUM CHLORIDE: 600; 310; 30; 20 INJECTION, SOLUTION INTRAVENOUS at 06:11

## 2018-05-05 RX ADMIN — LEVOTHYROXINE SODIUM 125 MCG: 125 TABLET ORAL at 06:15

## 2018-05-05 RX ADMIN — MEMANTINE 10 MG: 10 TABLET ORAL at 21:30

## 2018-05-05 RX ADMIN — CITALOPRAM HYDROBROMIDE 20 MG: 20 TABLET ORAL at 09:25

## 2018-05-05 RX ADMIN — Medication 12.5 MG: at 06:15

## 2018-05-05 NOTE — PLAN OF CARE
Problem: Patient Care Overview  Goal: Plan of Care/Patient Progress Review  Outcome: No Change  Remains confused and oriented only to place. Pulling frequently on Rodriguez catheter and required mitts for protection of the line. Bed alarm on zone 2. Frequently sets alarm off.  Hypertensive; didn't meet PRN requirements but has them available.

## 2018-05-05 NOTE — PROGRESS NOTES
[facility]    Hospitalist Progress Note    Date of Service (when I saw the patient): 05/05/2018    Assessment & Plan   89-year-old gentleman with history including hypertension, 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      CKD with SARAHI   BPH   Previous Baseline 1.4?, Cr 2.0 in [facility] 11/17.  UA with > 182 RBC (from traumatic cath in the ED).  CT stone negative for CT or hydronephrosis. Very large prostate. suspected some component of obstructive uropathy (enlarged prostate) but mostly due to underlying CKD due to uncontrolled HTN and PVD.  In discussion with wife patient had only been taking Norvasc for BP. No ACE/ARB or diuretic. Denies NSAIDs.  -- Urology consulted and a ware was placed on 5/3.    -- Cr Trend 3.3--3.09--2.94-> (Ware)-->2.82-->2.93.   -- Not a candidate for HD. Ok to follow up with nephrology clinic on discharge.   -- Bladder drained 450ml after ware insertion. Per Urology: Although he is having high postvoid residuals he is not in retention.   -- Will c/w his ware catheter on d/c until he can follow up with Urology clinic with Dr. Pizarro 5/15/18.  -- PTA Cardura stopped due to his falls and he was placed on Finasteride 5 mg daily this stay.   -- D/c IVF.   -- Renal US with evidence of chronic disease and no obstruction. Several cyst noted.   -- Follow BMP in am.      Sinus Bradycardia  EKG on admission with HR 40-50s with Mobitz, 1. HR SR 70s this am and fell to 40s after PTA BB 5/2. Repeat EKG with Mobitz type 1. No previous EKG or cardiac work-up in [facility] or [facility]. Patient does not appear to be symptomatic but difficult determine with advanced dementia.  Wife states patient had not been on Metoprolol PTA.   -- D/C'd Metoprolol 50 mg XL as patient not  taking this at home and due to bradycardia.    -- C/w Telemetry.  -- Patient currently asymptotic. No recurrent bradycardia since stopping BB.   -- Echo with EF 45-50%, mild global hypokinesia of LV, dilated LA, 1-2+ MR and TR, Pulm HTN, mod AV stenosis.       Mechanical Fall    No seizure activity reported. Witnessed by his wife and was reported that he tripped over the steps outside of his home and no LOC reported. Unable to determine from patient if dizzy prior to fall or any other sx's given dementia. No events on tele.   -- Workup as above.   -- PT/OT consult--> TCU      Essential hypertension:   Patient was only receiving Norvasc 5 mg/d at home. Per PCP note 11/17 patient was suppose to be on BB, ACE and diuretic. BP sub optimal on admission.  Apparently he has not been filling his Rx's and not taking his BP medications PTA.   -- Norvasc increased to 10 mg/d and Hydralazine 12.5 mg TID added 5/4.  -- Hydralazine increased to 25 mg TID 5/5. Would like better control but ok to be a little liberal with his age and AS.     -- Will avoid BB due to bradycardia and Ace-I/ARB or diuretic due to SARAHI/CKD.       Peripheral arterial disease:  History of abdominal aortic aneurysm endograft repair about 4 years ago.    -- Continue aspirin.      Hypothyroidism:  -- Continue levothyroxine 125 mcg daily.      Seizure disorder:   -- Keppra level <2 on admit. Unclear if he has been taking his keppra as he has not been taking any of his BP meds.   -- Continue Keppra 250 mg QHS.    -- Repeat keppra level pending.      Dementia:  -- Resume PTA memantine 10 mg BID, donepezil 10 mg QHS and Celexa 20 mg daily.     # Pain Assessment:  Current Pain Score 5/5/2018   Patient currently in pain? denies   Pain score (0-10) -   Yuriy knapp pain level was assessed and he currently denies pain.        DVT Prophylaxis: Pneumatic Compression Devices  Code Status: DNR/DNI       Disposition:  Hydralazine adjusted again today for ongoing HTN.  D/c IVF  and follow bmp. Anticipate d/c to TCU tomorrow if BP better controlled.       Kelvin Elliott Humphrey       Interval History   No acute overnight events. Still hypertensive today. Hydralazine increased. No new complaints.  Needed Seroquel x 1 overnight for some mild agitation.     -Data reviewed today: I reviewed all new labs and imaging results over the last 24 hours. I personally reviewed no images or EKG's today.    Physical Exam   Temp: 99  F (37.2  C) Temp src: Oral BP: (!) 173/93  Heart Rate: 91 Resp: 16 SpO2: 95 % O2 Device: None (Room air)    Vitals:    05/01/18 1928 05/03/18 0157   Weight: 61.7 kg (136 lb) 65.8 kg (145 lb)     Vital Signs with Ranges  Temp:  [95.6  F (35.3  C)-99  F (37.2  C)] 99  F (37.2  C)  Heart Rate:  [75-91] 91  Resp:  [16-18] 16  BP: (150-177)/(74-99) 173/93  SpO2:  [92 %-95 %] 95 %  I/O last 3 completed shifts:  In: 2182.5 [P.O.:620; I.V.:1562.5]  Out: 1500 [Urine:1500]    Gen: Patient in no acute distress.  Appears comfortable. Pleasantly confused.   Heart:  S1S2+, regular rate and rhythm, + 2-3/6 Systolic murmur.  Lungs:  Clear to auscultation, no wheezing, no rales.   Abdomen:  Soft, non tender, non distended, bowel sounds positive.  Extremities:  Trace edema.    Medications     lactated ringers 50 mL/hr at 05/05/18 0611       amLODIPine (NORVASC) tablet 10 mg  10 mg Oral Daily     citalopram (celeXA) tablet 20 mg  20 mg Oral Daily     donepezil (ARICEPT) tablet 10 mg  10 mg Oral At Bedtime     finasteride  5 mg Oral Daily     hydrALAZINE  12.5 mg Oral Once     hydrALAZINE  25 mg Oral Q8H RAMIRO     levETIRAcetam  250 mg Oral At Bedtime     levothyroxine (SYNTHROID/LEVOTHROID) tablet 125 mcg  125 mcg Oral QAM AC     memantine  10 mg Oral BID       Data     Recent Labs  Lab 05/05/18  0700 05/04/18  1637 05/04/18  0705 05/03/18  0545 05/02/18  0627 05/01/18  1715   WBC  --   --   --   --  7.0 8.5   HGB  --   --  9.0*  --  9.0* 10.1*   MCV  --   --   --   --  88 88   PLT  --   --   --    --  109* 138*   INR  --   --   --   --   --  1.15*    143 141 143 145* 143   POTASSIUM 3.8  --  4.2 4.5 4.3 4.5   CHLORIDE 114*  --  112* 112* 115* 111*   CO2 23  --  20 22 21 23   BUN 43*  --  41* 48* 45* 48*   CR 2.93* 2.88* 2.82* 2.94* 3.09* 3.38*   ANIONGAP 7  --  9 9 9 9   TEMO 7.6*  --  7.6* 7.6* 7.5* 8.0*   GLC 86  --  89 86 98 103*   ALBUMIN  --   --   --   --   --  2.6*   PROTTOTAL  --   --   --   --   --  5.7*   BILITOTAL  --   --   --   --   --  0.3   ALKPHOS  --   --   --   --   --  74   ALT  --   --   --   --   --  11   AST  --   --   --   --   --  14   TROPI  --   --   --   --   --  0.020       Recent Results (from the past 24 hour(s))   US Renal Complete    Narrative    ULTRASOUND RETROPERITONEAL COMPLETE 5/4/2018 3:24 PM     HISTORY: 89-year-old male with acute kidney injury on chronic disease.  Evaluate for any acute pathology.    COMPARISON: CT 5/2/2018    FINDINGS: The bilateral renal parenchyma are abnormally increased in  echogenicity without evidence for shadowing stone or mass. There are  numerous bilateral renal cysts, measuring up to 4.5 cm on the right  and 4.7 cm on the left.. No hydronephrosis. Right kidney measures 11.9  x 5.5 x 6.0 cm and the left measures 11.4 x 5.3 x 6.1 cm. Cortical  thickness is 1.4 cm on the right and 1.9 cm on the left. Bladder is  decompressed by a Rodriguez catheter.      Impression    IMPRESSION: Increased echogenicity of the kidneys compatible with  medical renal disease. Numerous simple appearing cysts. No  hydronephrosis.    GEORGINA MORGAN MD

## 2018-05-05 NOTE — PLAN OF CARE
Problem: Patient Care Overview  Goal: Plan of Care/Patient Progress Review  Pt oriented to self. BP elevated, scheduled medications given. Rodriguez patent, leaking pink urine at times. Pt found pulling on it occasionally. Tele: SR w/ 1st degree AVB. R knee covered with gauze, bandaid. Ambulated with PT and in room with RN. Up ao1, gb, walker. Impulsive at times. seroquel given in AM for anxiousness. Appears ineffective. Urology paged regarding leaking catheter, flushed per recommendation, intact thus far.

## 2018-05-05 NOTE — PROVIDER NOTIFICATION
"Page out to: Dr. Humphrey: \"stat urology consult to replace catheter\"    Recommendation to call urology.    Page placed out to Dr. Riley per urology answering service @ [phone]    2nd page placed out Dr. Riley @ [phone]    Call returned: irrigate the catheter.   "

## 2018-05-05 NOTE — PLAN OF CARE
Problem: Patient Care Overview  Goal: Plan of Care/Patient Progress Review  Outcome: No Change  Alert to self, disoriented x 3.  VSS, RA.  Pt keeps jumping out of bed wanting to go to BR, needs constant reminder of ware.  Started long arm sit @ 19:00.  Other wise pt is calm and cooperative.  Tolerating diet, requested room service.  Up with 2 and walker.  Ware patent and intact.  Skin tear on right elbow ANYI.  IV infusing @ 50 ml/hr.  Will send UA once collected.  Tele SR with 1st degree block.  Seroquel x 1 administered.  Mitten at bedside if pt pulls on lines.  Possible d/c tomorrow if BP stable.  Continue to monitor.

## 2018-05-05 NOTE — PLAN OF CARE
Problem: Patient Care Overview  Goal: Plan of Care/Patient Progress Review  PT:  Discharge Planner PT   Patient plan for discharge: Return home  Current status: Supine > sit with min A to initiate BLE towards EOB and then SBA for pt to bring trunk upright, sit <> stand with SBA and cues for hand placement, amb x80' with FWW and CGA, slightly impulsive but no overt LOB noted  Barriers to return to prior living situation: High falls risk, needs assist with all mobility, confusion limiting safety.  Recommendations for discharge: Home with strict 24hr supervision with all mobility/ADLs and HHPT  Rationale for recommendations: Pt demonstrating improved mobility this session compared to yesterday's PT evaluation.  Given pt's cognitive status, pt will most likely do better in familiar surroundings.  Anticipate pt would be safe to discharge home with strict 24hr supervision for all mobility and ADLs for safety and could benefit from HHPT to progress balance and mobility deficits.       Entered by: Eugenie Cloud 05/05/2018 10:35 AM

## 2018-05-06 LAB — LEVETIRACETAM SERPL-MCNC: 7 UG/ML (ref 12–46)

## 2018-05-06 PROCEDURE — 25000132 ZZH RX MED GY IP 250 OP 250 PS 637: Mod: GY | Performed by: PHYSICIAN ASSISTANT

## 2018-05-06 PROCEDURE — A9270 NON-COVERED ITEM OR SERVICE: HCPCS | Mod: GY | Performed by: UROLOGY

## 2018-05-06 PROCEDURE — A9270 NON-COVERED ITEM OR SERVICE: HCPCS | Mod: GY | Performed by: PHYSICIAN ASSISTANT

## 2018-05-06 PROCEDURE — 25000132 ZZH RX MED GY IP 250 OP 250 PS 637: Mod: GY | Performed by: UROLOGY

## 2018-05-06 PROCEDURE — 25000128 H RX IP 250 OP 636: Performed by: INTERNAL MEDICINE

## 2018-05-06 PROCEDURE — A9270 NON-COVERED ITEM OR SERVICE: HCPCS | Mod: GY | Performed by: INTERNAL MEDICINE

## 2018-05-06 PROCEDURE — 25000132 ZZH RX MED GY IP 250 OP 250 PS 637: Mod: GY | Performed by: INTERNAL MEDICINE

## 2018-05-06 PROCEDURE — 25000125 ZZHC RX 250: Performed by: UROLOGY

## 2018-05-06 PROCEDURE — A9270 NON-COVERED ITEM OR SERVICE: HCPCS | Performed by: UROLOGY

## 2018-05-06 PROCEDURE — 12000000 ZZH R&B MED SURG/OB

## 2018-05-06 PROCEDURE — 99232 SBSQ HOSP IP/OBS MODERATE 35: CPT | Performed by: INTERNAL MEDICINE

## 2018-05-06 RX ORDER — HYDRALAZINE HYDROCHLORIDE 25 MG/1
25 TABLET, FILM COATED ORAL ONCE
Status: COMPLETED | OUTPATIENT
Start: 2018-05-06 | End: 2018-05-06

## 2018-05-06 RX ORDER — QUETIAPINE FUMARATE 25 MG/1
25 TABLET, FILM COATED ORAL AT BEDTIME
Status: DISCONTINUED | OUTPATIENT
Start: 2018-05-06 | End: 2018-05-10

## 2018-05-06 RX ORDER — HYDRALAZINE HYDROCHLORIDE 50 MG/1
50 TABLET, FILM COATED ORAL EVERY 8 HOURS SCHEDULED
Status: DISCONTINUED | OUTPATIENT
Start: 2018-05-06 | End: 2018-05-07

## 2018-05-06 RX ORDER — QUETIAPINE FUMARATE 25 MG/1
25 TABLET, FILM COATED ORAL 2 TIMES DAILY PRN
Status: DISCONTINUED | OUTPATIENT
Start: 2018-05-06 | End: 2018-05-10

## 2018-05-06 RX ORDER — SODIUM CHLORIDE, SODIUM LACTATE, POTASSIUM CHLORIDE, CALCIUM CHLORIDE 600; 310; 30; 20 MG/100ML; MG/100ML; MG/100ML; MG/100ML
INJECTION, SOLUTION INTRAVENOUS CONTINUOUS
Status: DISCONTINUED | OUTPATIENT
Start: 2018-05-06 | End: 2018-05-07

## 2018-05-06 RX ADMIN — MEMANTINE 10 MG: 10 TABLET ORAL at 21:35

## 2018-05-06 RX ADMIN — QUETIAPINE FUMARATE 25 MG: 25 TABLET ORAL at 21:35

## 2018-05-06 RX ADMIN — DONEPEZIL HYDROCHLORIDE 10 MG: 10 TABLET ORAL at 21:35

## 2018-05-06 RX ADMIN — HYDRALAZINE HYDROCHLORIDE 50 MG: 50 TABLET ORAL at 14:29

## 2018-05-06 RX ADMIN — FINASTERIDE 5 MG: 5 TABLET, FILM COATED ORAL at 08:08

## 2018-05-06 RX ADMIN — AMLODIPINE BESYLATE 10 MG: 10 TABLET ORAL at 08:08

## 2018-05-06 RX ADMIN — LEVETIRACETAM 250 MG: 250 TABLET, FILM COATED ORAL at 21:35

## 2018-05-06 RX ADMIN — SODIUM CHLORIDE, POTASSIUM CHLORIDE, SODIUM LACTATE AND CALCIUM CHLORIDE: 600; 310; 30; 20 INJECTION, SOLUTION INTRAVENOUS at 10:07

## 2018-05-06 RX ADMIN — QUETIAPINE FUMARATE 25 MG: 25 TABLET ORAL at 16:13

## 2018-05-06 RX ADMIN — SODIUM CHLORIDE, POTASSIUM CHLORIDE, SODIUM LACTATE AND CALCIUM CHLORIDE: 600; 310; 30; 20 INJECTION, SOLUTION INTRAVENOUS at 20:31

## 2018-05-06 RX ADMIN — MEMANTINE 10 MG: 10 TABLET ORAL at 10:41

## 2018-05-06 RX ADMIN — ATROPA BELLADONNA AND OPIUM 1 SUPPOSITORY: 16.2; 3 SUPPOSITORY RECTAL at 02:14

## 2018-05-06 RX ADMIN — LEVOTHYROXINE SODIUM 125 MCG: 125 TABLET ORAL at 06:43

## 2018-05-06 RX ADMIN — CITALOPRAM HYDROBROMIDE 20 MG: 20 TABLET ORAL at 08:08

## 2018-05-06 RX ADMIN — HYDRALAZINE HYDROCHLORIDE 25 MG: 25 TABLET ORAL at 08:08

## 2018-05-06 RX ADMIN — HYDRALAZINE HYDROCHLORIDE 50 MG: 50 TABLET ORAL at 21:35

## 2018-05-06 RX ADMIN — Medication 25 MG: at 06:43

## 2018-05-06 NOTE — PROGRESS NOTES
Spoke with [facility] for further instructions. Deflated balloon, irrigated with 60 cc with good urine output. Balloon inflated, and tubing secured. Will continue to monitor.

## 2018-05-06 NOTE — PLAN OF CARE
Problem: Patient Care Overview  Goal: Plan of Care/Patient Progress Review  Outcome: No Change  Pt oriented to self. Tele: SR w/ 1st degree AVB. BP elevated Hypertensive; didn't meet PRN requirements but has them available. Bladder scan 299 ware not patent, irrigated instant output 400ml, dark maximiliano in color. Pt found pulling on it occasionally. Bed alarm on zone 2. Frequently sets alarm off. Rt knee covered with bandaid. Ambulated with PT and in room with RN. Up ao1, gb, walker. Impulsive at times.

## 2018-05-06 NOTE — PROGRESS NOTES
Pt oriented to self. Tele: SR w/ 1st degree AVB. Rodriguez patent,  Pt found pulling on it occasionally. Bed alarm on zone 2. Frequently sets alarm off. Rt knee covered with bandaid. Ambulated with walker and in room with aide. Impulsive at times. LR infusing at 75 . Appetite good, VSS.  Plan for discharge to home with spouse on Monday.

## 2018-05-06 NOTE — PROGRESS NOTES
Pt continues to be restless, PRN Serquel given. Spoke with Dr Humphrey in regards to urine output of 200 ml from ware for the day shift. Bladder scanned for 253 ml. Irrigated with 70 ml with no return. Will contact Urology.

## 2018-05-06 NOTE — PROGRESS NOTES
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      CKD with SARAHI   BPH   Previous Baseline 1.4?, [facility].  UA with > 182 RBC (from traumatic cath in the ED).  CT stone negative for CT or hydronephrosis. Very large prostate. suspected some component of obstructive uropathy (enlarged prostate) but mostly due to underlying CKD due to uncontrolled HTN and PVD.  In discussion with wife patient had only been taking Norvasc for BP PTA. No ACE/ARB or diuretic. Denies NSAIDs.  -- Cr Trend 3.3--3.09--2.94-> (Ware)-->2.82-->2.93.   -- Urology consulted and a ware was placed on 5/3.    -- Bladder drained 450ml after ware insertion. Per Urology: Although he is having high postvoid residuals he is not in retention.   -- Not a candidate for HD. Ok to follow up with nephrology clinic on discharge (have not been consulted here).  -- Will c/w his ware catheter on d/c until he can follow up with Urology clinic with Dr. Pizarro 5/15/18.  -- PTA Cardura stopped due to his falls and he was placed on Finasteride 5 mg daily this stay.   -- Overall no significant improvement after ware placement.   -- Renal US with evidence of chronic disease and no obstruction. Several cyst noted.   -- Will resume IVF today as he did have a slight increase in cr overnight but suspect this will not do much. D/c 5/7 if no significant improvement.   -- Follow BMP in am.      Sinus Bradycardia  EKG on admission with HR 40-50s with Lukasz, 1. HR SR 70s this am and fell to 40s after PTA BB 5/2. Repeat EKG with Lukasz type 1. No previous EKG  or cardiac work-up in [facility] or [facility]. Patient does not appear to be symptomatic but difficult determine with advanced dementia.  Wife states patient had not been on Metoprolol PTA.   -- D/C'd Metoprolol 50 mg XL as patient not taking this at home and due to bradycardia.    -- C/w Telemetry.  -- Patient currently asymptotic. No recurrent bradycardia since stopping BB.   -- Echo with EF 45-50%, mild global hypokinesia of LV, dilated LA, 1-2+ MR and TR, Pulm HTN, mod AV stenosis.       Mechanical Fall    No seizure activity reported. Witnessed by his wife and was reported that he tripped over the steps outside of his home and no LOC reported. Unable to determine from patient if dizzy prior to fall or any other sx's given dementia. No events on tele.   -- Workup as above.   -- PT/OT consult--> TCU      Essential hypertension:   Patient was only receiving Norvasc 5 mg/d at home. Per PCP note 11/17 patient was suppose to be on BB, ACE and diuretic. BP sub optimal on admission.  Apparently he has not been filling his Rx's and not taking his BP medications PTA.   -- Norvasc increased to 10 mg/d and Hydralazine 12.5 mg TID added 5/4.  -- Hydralazine increased to 25 mg TID 5/5 and 50 mg 5/6. Would like better control but ok to be a little liberal with his age and AS. Goal sbp 150-160.      -- Will avoid BB due to bradycardia and Ace-I/ARB or diuretic due to SARAHI/CKD.       Peripheral arterial disease:  History of abdominal aortic aneurysm endograft repair about 4 years ago.    -- Continue aspirin.      Hypothyroidism:  -- Continue levothyroxine 125 mcg daily.      Seizure disorder:   -- Keppra level <2 on admit. Unclear if he has been taking his keppra as he has not been taking any of his BP meds. Repeat Level 5/4 increased to 7 suggesting he likely had not been taking it.     -- Continue Keppra 250 mg QHS.     -- Will need a repeat level in 1 week post d/c to ensure compliance.      Dementia:  -- Resume PTA memantine  10 mg BID, donepezil 10 mg QHS and Celexa 20 mg daily.   -- Seroquel 12.5 mg bid prn added this stay. Appears this has not been that effective at this dose.   -- Increased to 25 mg Bid prn and Qhs. Will follow.      # Pain Assessment:  Current Pain Score 5/6/2018   Patient currently in pain? yes   Pain score (0-10) -   Yuriy knapp pain level was assessed and he currently denies pain.        DVT Prophylaxis: Pneumatic Compression Devices  Code Status: DNR/DNI       Disposition:  Hydralazine adjusted again today for ongoing HTN. Would like better control but ok to be a little liberal with his age and AS. Goal sbp 150-160.  If BP better controlled then he can likely discharge tomorrow. I updated his wife Eva today and they do live together at home. She prefers he return home if able with some increased services (PT/OT/HHA). Also d/w her that he will need close follow up with his PCP, Urology and a Nephrology referral as well.          Kelvin Humphrey       Interval History   BP still not ideal.  Hydralazine increased today. Renal fxn overall stable. No new complaints. Needing a long arm sit today.  Seroquel dose increased.       -Data reviewed today: I reviewed all new labs and imaging results over the last 24 hours. I personally reviewed no images or EKG's today.    Physical Exam   Temp: 98.2  F (36.8  C) Temp src: Oral BP: (!) 173/91 Pulse: 74 Heart Rate: 97 Resp: 16 SpO2: 95 % O2 Device: None (Room air)    Vitals:    05/01/18 1928 05/03/18 0157   Weight: 61.7 kg (136 lb) 65.8 kg (145 lb)     Vital Signs with Ranges  Temp:  [96  F (35.6  C)-98.2  F (36.8  C)] 98.2  F (36.8  C)  Pulse:  [74] 74  Heart Rate:  [60-99] 97  Resp:  [16] 16  BP: (155-185)/(79-97) 173/91  SpO2:  [95 %-98 %] 95 %  I/O last 3 completed shifts:  In: 360 [P.O.:360]  Out: 1300 [Urine:1300]    Gen: Patient in no acute distress.  Appears comfortable. Pleasantly confused.   Heart:  S1S2+, regular rate and rhythm, + 2-3/6 Systolic  murmur.  Lungs:  Clear to auscultation, no wheezing, no rales.   Abdomen:  Soft, non tender, non distended, bowel sounds positive.  Extremities:  No edema.    Medications       amLODIPine (NORVASC) tablet 10 mg  10 mg Oral Daily     citalopram (celeXA) tablet 20 mg  20 mg Oral Daily     donepezil (ARICEPT) tablet 10 mg  10 mg Oral At Bedtime     finasteride  5 mg Oral Daily     hydrALAZINE  50 mg Oral Q8H RAMIRO     levETIRAcetam  250 mg Oral At Bedtime     levothyroxine (SYNTHROID/LEVOTHROID) tablet 125 mcg  125 mcg Oral QAM AC     memantine  10 mg Oral BID       Data     Recent Labs  Lab 05/05/18  0700 05/04/18  1637 05/04/18  0705 05/03/18  0545 05/02/18  0627 05/01/18  1715   WBC  --   --   --   --  7.0 8.5   HGB  --   --  9.0*  --  9.0* 10.1*   MCV  --   --   --   --  88 88   PLT  --   --   --   --  109* 138*   INR  --   --   --   --   --  1.15*    143 141 143 145* 143   POTASSIUM 3.8  --  4.2 4.5 4.3 4.5   CHLORIDE 114*  --  112* 112* 115* 111*   CO2 23  --  20 22 21 23   BUN 43*  --  41* 48* 45* 48*   CR 2.93* 2.88* 2.82* 2.94* 3.09* 3.38*   ANIONGAP 7  --  9 9 9 9   TEMO 7.6*  --  7.6* 7.6* 7.5* 8.0*   GLC 86  --  89 86 98 103*   ALBUMIN  --   --   --   --   --  2.6*   PROTTOTAL  --   --   --   --   --  5.7*   BILITOTAL  --   --   --   --   --  0.3   ALKPHOS  --   --   --   --   --  74   ALT  --   --   --   --   --  11   AST  --   --   --   --   --  14   TROPI  --   --   --   --   --  0.020       No results found for this or any previous visit (from the past 24 hour(s)).

## 2018-05-07 ENCOUNTER — APPOINTMENT (OUTPATIENT)
Dept: PHYSICAL THERAPY | Facility: CLINIC | Age: 83
DRG: 682 | End: 2018-05-07
Payer: MEDICARE

## 2018-05-07 LAB
ANION GAP SERPL CALCULATED.3IONS-SCNC: 9 MMOL/L (ref 3–14)
BUN SERPL-MCNC: 38 MG/DL (ref 7–30)
CALCIUM SERPL-MCNC: 7.8 MG/DL (ref 8.5–10.1)
CHLORIDE SERPL-SCNC: 112 MMOL/L (ref 94–109)
CO2 SERPL-SCNC: 21 MMOL/L (ref 20–32)
CREAT SERPL-MCNC: 2.95 MG/DL (ref 0.66–1.25)
GFR SERPL CREATININE-BSD FRML MDRD: 20 ML/MIN/1.7M2
GLUCOSE SERPL-MCNC: 88 MG/DL (ref 70–99)
INTERPRETATION ECG - MUSE: NORMAL
INTERPRETATION ECG - MUSE: NORMAL
POTASSIUM SERPL-SCNC: 3.5 MMOL/L (ref 3.4–5.3)
SODIUM SERPL-SCNC: 142 MMOL/L (ref 133–144)

## 2018-05-07 PROCEDURE — A9270 NON-COVERED ITEM OR SERVICE: HCPCS | Mod: GY | Performed by: PHYSICIAN ASSISTANT

## 2018-05-07 PROCEDURE — A9270 NON-COVERED ITEM OR SERVICE: HCPCS | Mod: GY | Performed by: UROLOGY

## 2018-05-07 PROCEDURE — 97530 THERAPEUTIC ACTIVITIES: CPT | Mod: GP | Performed by: PHYSICAL THERAPY ASSISTANT

## 2018-05-07 PROCEDURE — 99232 SBSQ HOSP IP/OBS MODERATE 35: CPT | Performed by: HOSPITALIST

## 2018-05-07 PROCEDURE — 40000193 ZZH STATISTIC PT WARD VISIT: Performed by: PHYSICAL THERAPY ASSISTANT

## 2018-05-07 PROCEDURE — 25000132 ZZH RX MED GY IP 250 OP 250 PS 637: Mod: GY | Performed by: PHYSICIAN ASSISTANT

## 2018-05-07 PROCEDURE — 25000132 ZZH RX MED GY IP 250 OP 250 PS 637: Mod: GY | Performed by: HOSPITALIST

## 2018-05-07 PROCEDURE — 25000132 ZZH RX MED GY IP 250 OP 250 PS 637: Mod: GY | Performed by: INTERNAL MEDICINE

## 2018-05-07 PROCEDURE — 36415 COLL VENOUS BLD VENIPUNCTURE: CPT | Performed by: HOSPITALIST

## 2018-05-07 PROCEDURE — A9270 NON-COVERED ITEM OR SERVICE: HCPCS | Mod: GY | Performed by: INTERNAL MEDICINE

## 2018-05-07 PROCEDURE — 97116 GAIT TRAINING THERAPY: CPT | Mod: GP | Performed by: PHYSICAL THERAPY ASSISTANT

## 2018-05-07 PROCEDURE — A9270 NON-COVERED ITEM OR SERVICE: HCPCS | Mod: GY | Performed by: HOSPITALIST

## 2018-05-07 PROCEDURE — 80048 BASIC METABOLIC PNL TOTAL CA: CPT | Performed by: HOSPITALIST

## 2018-05-07 PROCEDURE — 25000132 ZZH RX MED GY IP 250 OP 250 PS 637: Mod: GY | Performed by: UROLOGY

## 2018-05-07 PROCEDURE — 12000000 ZZH R&B MED SURG/OB

## 2018-05-07 RX ADMIN — HYDRALAZINE HYDROCHLORIDE 50 MG: 50 TABLET ORAL at 13:17

## 2018-05-07 RX ADMIN — LEVETIRACETAM 250 MG: 250 TABLET, FILM COATED ORAL at 20:21

## 2018-05-07 RX ADMIN — LEVOTHYROXINE SODIUM 125 MCG: 125 TABLET ORAL at 06:41

## 2018-05-07 RX ADMIN — CITALOPRAM HYDROBROMIDE 20 MG: 20 TABLET ORAL at 09:51

## 2018-05-07 RX ADMIN — FINASTERIDE 5 MG: 5 TABLET, FILM COATED ORAL at 09:51

## 2018-05-07 RX ADMIN — HYDRALAZINE HYDROCHLORIDE 50 MG: 50 TABLET ORAL at 06:41

## 2018-05-07 RX ADMIN — DONEPEZIL HYDROCHLORIDE 10 MG: 10 TABLET ORAL at 20:21

## 2018-05-07 RX ADMIN — AMLODIPINE BESYLATE 10 MG: 10 TABLET ORAL at 09:51

## 2018-05-07 RX ADMIN — HYDRALAZINE HYDROCHLORIDE 75 MG: 50 TABLET ORAL at 20:20

## 2018-05-07 RX ADMIN — MEMANTINE 10 MG: 10 TABLET ORAL at 20:21

## 2018-05-07 RX ADMIN — MEMANTINE 10 MG: 10 TABLET ORAL at 09:51

## 2018-05-07 RX ADMIN — QUETIAPINE FUMARATE 25 MG: 25 TABLET ORAL at 20:21

## 2018-05-07 NOTE — PROGRESS NOTES
"MORENITA  D:  Per CC update, pt's wife feels she is unable to care for pt at home and would like pt to d/c to [facility].  Wife offered no other facility preferences.  I:  KIMBERLY sent referral to [facility] by [employer] process.  P:  SW will follow pt for TCU placement.  Pt not seen yet by KIMBERLY.Care Transition Initial Assessment - KIMBERLY  Reason For Consult: discharge planning  Met with: Patient  And \"common law wife\" Eva  Active Problems:    Fall    Acute renal failure (ARF) (H)       DATA  Lives With: significant other  Living Arrangements: house  Description of Support System: Supportive, Involved      .   Identified issues/concerns regarding health management: Pt's significant other Eva believes she cannot manage pt's needs at home and would like pt to d/c to a TCU in [place] so she can visit pt regularly.                    ASSESSMENT  Cognitive Status:  awake and alert, impulsive  Concerns to be addressed: TCU placement .     PLAN  Financial costs for the patient includes  Private room if desired, transport if needed .  Patient given options and choices for discharge SO Eva would like pt to go to a TCU in [place]. .  Patient/family is agreeable to the plan?  Yes:   Patient Goals and Preferences: TCU .  Patient anticipates discharging to:  TCU .      Per Kesha at [facility], she is declining pt admission due to his memory care needs.  Pt follows commands only 50% of the time and is impulsive and Kesha is concerned pt may attempt to elope from facility.  Pt lives with LUCINDA Velasquez in a home in [place] with no services.  KIMBERLY suggested memory care TCU to Eva, however, she cannot drive very far to visit pt and [place] [facility] are too far.  Eva wants a facility in [place] and agreed with referral being sent to [facility]- referral sent by [employer] process.  KIMBERLY will follow pt for TCU placement.  Eva informed KIMBERLY that pt has an estranged relationship with his son as son is an alcoholic and drug addict.  Eva informed KIBMERLY she is pt's POA " and will bring in a copy of HCD to be scanned into pt's chart.        End of day update:  Per Amna at [facility], they can accept pt for rehab, but it will be on their memory care locked LTC unit.  SO Velasquez provided SW with a form signed by a neurologist saying Eva can make HC decisions for pt as he no longer has cognitive capacity to do so, however, it does not look like a legal HCD and was not notarized.  Form forwarded to care transitions supervisor Denia for review.  If it is determined form does not give Eva the ability to make HC decisions on pt's behalf, further family will need to be located for pt to make decisions about HC plan.  Amna at [facility] reports they would need someone to sign pt into memory care unit since it is a locked unit.  SWS will continue to follow pt for placement.

## 2018-05-07 NOTE — PLAN OF CARE
Problem: Patient Care Overview  Goal: Plan of Care/Patient Progress Review  Discharge Planner PT   Patient plan for discharge: TCU prior to returning home  Current status: Pt performed bed mobility with min assist and sit to/from stand transfers with min assist.  Gait training x 140 ft using wheeled walker and CGA-min assist for balance.  Pt is unsteady at times especially with turns.  Pt was fatigued with activity and declined further gait.    Barriers to return to prior living situation: High falls risk, needs assist with all mobility, confusion limiting safety.  Recommendations for discharge: Changed to TCU, after collaboration with the PT.    Rationale for recommendations: Pt requires assist with mobility and is unsteady during gait.  Pt's wife was present and doesn't feel she can manage needs at home.  Continued PT needed to progress strength and independence with mobility.        Entered by: Amna Laura 05/07/2018 4:22 PM

## 2018-05-07 NOTE — PLAN OF CARE
Problem: Patient Care Overview  Goal: Plan of Care/Patient Progress Review  Outcome: No Change  Pt alert, oriented to self only. Elevated /78. Gave schedule BP meds. PRN Hydralazine available for SBP>180 or DBP >100. Other VSS on RA. L/s clear. Denies pain. +BS. Rodriguez catheter patent, draining pink urine. PIV infusing LR 75ml/hr. Agitated at times. Bed alarm on zone 2. Expected d/c home on Monday.

## 2018-05-07 NOTE — PLAN OF CARE
Problem: Urine Elimination Impaired (Adult)  Goal: Effective Urinary Elimination  Patient will demonstrate the desired outcomes by discharge/transition of care.  Outcome: No Change  Elevated BP, did not reach PRN parameters. Tele- sinus dysrhythmia w/ 1st degree AV block. Orientated to self only, intermittent agitation overnight. Up w/ ax1+walker/GB. Ware draining pink tinged urine, gets blocked at times, the balloon needs to be deflated and tube adjusted to allow drainage. Possible dc to home w/ wife today, will need ware teaching. Will continue to monitor.

## 2018-05-07 NOTE — PLAN OF CARE
Problem: Patient Care Overview  Goal: Plan of Care/Patient Progress Review  Outcome: Improving  VSS; except HTN (hydralazine scheduled given). A + O x 2. No pain reported today. BM today but did not see it. Ambulated in room with walker and gait belt assist x1. Rodriguez with adequate output for this shift. Wife here all day. SW working on TCU, but was not accepted by [facility] (due to memory).

## 2018-05-07 NOTE — PROGRESS NOTES
[facility]    Hospitalist Progress Note    Date of Service (when I saw the patient): 05/07/2018    Assessment & Plan   89-year-old gentleman with history including hypertension, 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      CKD with SARAHI   BPH   Previous Baseline 1.4? (stable in this range 2014 and 2016), Cr 2.0 in CareEverywhere 11/17.  UA with > 182 RBC (possibly from traumatic cath in the ED) and >300 protein (also note UA 11/2017 with >300 protein and moderate blood).  CT stone negative for CT or hydronephrosis, but noted very large prostate.  Renal US negative for hydronephrosis.  Suspected some component of obstructive uropathy (enlarged prostate) but mostly due to underlying CKD due to uncontrolled HTN and PVD.  In discussion with wife patient had only been taking Norvasc for BP PTA. No ACE/ARB or diuretic or BB as had been prescribed by PCP. Denies NSAIDs.  - minimal improvement in Cr this stay, currently stable at 2.9 despite IVF overnight; will discontinue  - Urology recommends continuing ware until seen in f/u with Dr. Pizarro 5/15/18  - will need close follow up with Neph for further work-up and monitoring  - PTA Cardura stopped due to his falls and he was placed on Finasteride 5 mg daily this stay       Sinus Bradycardia  EKG on admission with HR 40-50s with Mobitz, 1.  Has experienced mild intermittent bradycardia this stay.  Note drop to 40's on 5/2 after beta-blocker was re-introduced, now discontinued.  Echo with EF 45-50%, mild global hypokinesia of LV, dilated LA, 1-2+ MR and TR, Pulm HTN, mod AV stenosis (no prior for comparison). Patient does not appear to be symptomatic but difficult determine with advanced dementia.  Wife states patient had not been on Metoprolol PTA.    - no further beta-blockers  - rates have been stable, will stop tele      Mechanical Fall    No seizure activity reported. Witnessed by his wife and was reported that he tripped over the steps outside of his home and no LOC reported. Unable to determine from patient if dizzy prior to fall or any other sx's given dementia. No events other than bradycardia on tele.  TTE as above.  - PT/OT consult--> TCU      Essential hypertension:   Patient was only receiving Norvasc 5 mg/d at home. Per PCP note 11/17 patient was suppose to be on BB, ACE and diuretic but had not been filling these.   - continue Norvasc 10 mg daily  - increase hydralazine to 75 mg tid on 5/7    - Will avoid BB due to bradycardia and Ace-I/ARB or diuretic due to SARAHI/CKD.       Peripheral arterial disease:  History of abdominal aortic aneurysm endograft repair about 4 years ago.    - Continue aspirin.      Hypothyroidism:  - Continue levothyroxine 125 mcg daily.      Seizure disorder:   Keppra level <2 on admit. Unclear if he has been taking his keppra as he has not been taking any of his BP meds. Repeat Level 5/4 increased to 7 suggesting he likely had not been taking it.  - Continue Keppra 250 mg QHS.     - Will need a repeat level in 1 week post d/c to ensure compliance.      Dementia:  - continue PTA memantine 10 mg BID, donepezil 10 mg QHS and Celexa 20 mg daily.   - Seroquel 25 mg Bid prn and Qhs    # Pain Assessment:  Current Pain Score 5/7/2018   Patient currently in pain? denies   Pain score (0-10) 0   Yuriy knapp pain level was assessed and he currently denies pain.        DVT Prophylaxis: Pneumatic Compression Devices  Code Status: DNR/DNI       Disposition:  To TCU tomorrow if blood pressure control improved.    Kike Knight       Interval History   Denies any pain, shortness of breath, chest pressure or fever/chills.       -Data reviewed today: I reviewed all new labs and imaging results over the last 24 hours. I personally reviewed no  images or EKG's today.    Physical Exam   Temp: 97.7  F (36.5  C) Temp src: Oral BP: 166/77 Pulse: 63 Heart Rate: 58 Resp: 18 SpO2: 95 % O2 Device: None (Room air)    Vitals:    05/01/18 1928 05/03/18 0157   Weight: 61.7 kg (136 lb) 65.8 kg (145 lb)     Vital Signs with Ranges  Temp:  [96.4  F (35.8  C)-97.7  F (36.5  C)] 97.7  F (36.5  C)  Pulse:  [63-67] 63  Heart Rate:  [58-80] 58  Resp:  [16-18] 18  BP: (157-179)/(73-86) 166/77  SpO2:  [93 %-96 %] 95 %  I/O last 3 completed shifts:  In: 972 [P.O.:120; I.V.:852]  Out: 1250 [Urine:1250]    Gen: Patient in no acute distress.  Appears comfortable. Pleasantly confused.  Sitting up in bed.  Heart:  S1S2+, regular rate and rhythm, + 2-3/6 Systolic murmur.  Lungs:  Clear to auscultation, no wheezing, no rales.   Abdomen:  Soft, non tender, non distended, bowel sounds positive.  Extremities:  1+ distal lower extremity edema.    Medications       amLODIPine (NORVASC) tablet 10 mg  10 mg Oral Daily     citalopram (celeXA) tablet 20 mg  20 mg Oral Daily     donepezil (ARICEPT) tablet 10 mg  10 mg Oral At Bedtime     finasteride  5 mg Oral Daily     hydrALAZINE  75 mg Oral Q8H RAMIRO     levETIRAcetam  250 mg Oral At Bedtime     levothyroxine (SYNTHROID/LEVOTHROID) tablet 125 mcg  125 mcg Oral QAM AC     memantine  10 mg Oral BID     QUEtiapine  25 mg Oral At Bedtime       Data     Recent Labs  Lab 05/07/18  1042 05/05/18  0700 05/04/18  1637 05/04/18  0705  05/02/18  0627 05/01/18  1715   WBC  --   --   --   --   --  7.0 8.5   HGB  --   --   --  9.0*  --  9.0* 10.1*   MCV  --   --   --   --   --  88 88   PLT  --   --   --   --   --  109* 138*   INR  --   --   --   --   --   --  1.15*    144 143 141  < > 145* 143   POTASSIUM 3.5 3.8  --  4.2  < > 4.3 4.5   CHLORIDE 112* 114*  --  112*  < > 115* 111*   CO2 21 23  --  20  < > 21 23   BUN 38* 43*  --  41*  < > 45* 48*   CR 2.95* 2.93* 2.88* 2.82*  < > 3.09* 3.38*   ANIONGAP 9 7  --  9  < > 9 9   TEMO 7.8* 7.6*  --  7.6*  < >  7.5* 8.0*   GLC 88 86  --  89  < > 98 103*   ALBUMIN  --   --   --   --   --   --  2.6*   PROTTOTAL  --   --   --   --   --   --  5.7*   BILITOTAL  --   --   --   --   --   --  0.3   ALKPHOS  --   --   --   --   --   --  74   ALT  --   --   --   --   --   --  11   AST  --   --   --   --   --   --  14   TROPI  --   --   --   --   --   --  0.020   < > = values in this interval not displayed.    No results found for this or any previous visit (from the past 24 hour(s)).

## 2018-05-08 PROBLEM — Z71.89 ACP (ADVANCE CARE PLANNING): Chronic | Status: ACTIVE | Noted: 2018-05-08

## 2018-05-08 LAB
ANION GAP SERPL CALCULATED.3IONS-SCNC: 9 MMOL/L (ref 3–14)
BUN SERPL-MCNC: 44 MG/DL (ref 7–30)
CALCIUM SERPL-MCNC: 7.9 MG/DL (ref 8.5–10.1)
CHLORIDE SERPL-SCNC: 114 MMOL/L (ref 94–109)
CO2 SERPL-SCNC: 21 MMOL/L (ref 20–32)
CREAT SERPL-MCNC: 3.27 MG/DL (ref 0.66–1.25)
GFR SERPL CREATININE-BSD FRML MDRD: 18 ML/MIN/1.7M2
GLUCOSE SERPL-MCNC: 92 MG/DL (ref 70–99)
POTASSIUM SERPL-SCNC: 3.5 MMOL/L (ref 3.4–5.3)
SODIUM SERPL-SCNC: 144 MMOL/L (ref 133–144)

## 2018-05-08 PROCEDURE — 36415 COLL VENOUS BLD VENIPUNCTURE: CPT | Performed by: HOSPITALIST

## 2018-05-08 PROCEDURE — 80048 BASIC METABOLIC PNL TOTAL CA: CPT | Performed by: HOSPITALIST

## 2018-05-08 PROCEDURE — 25000132 ZZH RX MED GY IP 250 OP 250 PS 637: Mod: GY | Performed by: INTERNAL MEDICINE

## 2018-05-08 PROCEDURE — A9270 NON-COVERED ITEM OR SERVICE: HCPCS | Mod: GY | Performed by: PHYSICIAN ASSISTANT

## 2018-05-08 PROCEDURE — 25000132 ZZH RX MED GY IP 250 OP 250 PS 637: Mod: GY | Performed by: PHYSICIAN ASSISTANT

## 2018-05-08 PROCEDURE — A9270 NON-COVERED ITEM OR SERVICE: HCPCS | Mod: GY | Performed by: HOSPITALIST

## 2018-05-08 PROCEDURE — A9270 NON-COVERED ITEM OR SERVICE: HCPCS | Mod: GY | Performed by: INTERNAL MEDICINE

## 2018-05-08 PROCEDURE — 12000000 ZZH R&B MED SURG/OB

## 2018-05-08 PROCEDURE — 25000132 ZZH RX MED GY IP 250 OP 250 PS 637: Mod: GY | Performed by: UROLOGY

## 2018-05-08 PROCEDURE — 25000132 ZZH RX MED GY IP 250 OP 250 PS 637: Mod: GY | Performed by: HOSPITALIST

## 2018-05-08 PROCEDURE — 99231 SBSQ HOSP IP/OBS SF/LOW 25: CPT | Performed by: HOSPITALIST

## 2018-05-08 PROCEDURE — A9270 NON-COVERED ITEM OR SERVICE: HCPCS | Mod: GY | Performed by: UROLOGY

## 2018-05-08 RX ADMIN — CITALOPRAM HYDROBROMIDE 20 MG: 20 TABLET ORAL at 09:40

## 2018-05-08 RX ADMIN — DONEPEZIL HYDROCHLORIDE 10 MG: 10 TABLET ORAL at 21:10

## 2018-05-08 RX ADMIN — HYDRALAZINE HYDROCHLORIDE 75 MG: 50 TABLET ORAL at 21:10

## 2018-05-08 RX ADMIN — LEVOTHYROXINE SODIUM 125 MCG: 125 TABLET ORAL at 06:38

## 2018-05-08 RX ADMIN — MEMANTINE 10 MG: 10 TABLET ORAL at 19:33

## 2018-05-08 RX ADMIN — HYDRALAZINE HYDROCHLORIDE 75 MG: 50 TABLET ORAL at 14:00

## 2018-05-08 RX ADMIN — HYDRALAZINE HYDROCHLORIDE 75 MG: 50 TABLET ORAL at 06:38

## 2018-05-08 RX ADMIN — FINASTERIDE 5 MG: 5 TABLET, FILM COATED ORAL at 09:40

## 2018-05-08 RX ADMIN — MEMANTINE 10 MG: 10 TABLET ORAL at 09:40

## 2018-05-08 RX ADMIN — AMLODIPINE BESYLATE 10 MG: 10 TABLET ORAL at 09:40

## 2018-05-08 RX ADMIN — LEVETIRACETAM 250 MG: 250 TABLET, FILM COATED ORAL at 21:10

## 2018-05-08 RX ADMIN — QUETIAPINE FUMARATE 25 MG: 25 TABLET ORAL at 21:10

## 2018-05-08 NOTE — PROGRESS NOTES
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      CKD  BPH   Previous Baseline 1.4? (stable in this range 2014 and 2016), Cr 2.0 in CareEverywhere 11/17.  UA with > 182 RBC (possibly from traumatic cath in the ED) and >300 protein (also note UA 11/2017 with >300 protein and moderate blood).  CT stone negative for CT or hydronephrosis, but noted very large prostate.  Renal US negative for hydronephrosis.  Suspected some component of obstructive uropathy (enlarged prostate) but mostly due to underlying CKD due to uncontrolled HTN and PVD.  In discussion with wife patient had only been taking Norvasc for BP PTA. No ACE/ARB or diuretic or BB as had been prescribed by PCP. Denies NSAIDs.  - Cr up to 3.2 today, will repeat again in AM  - Urology recommends continuing ware until seen in f/u with Dr. Pizarro 5/15/18  - will need close follow up with Neph for further work-up and monitoring  - PTA Cardura stopped due to his falls and he was placed on Finasteride 5 mg daily this stay       Sinus Bradycardia  EKG on admission with HR 40-50s with Mobitz, 1.  Has experienced mild intermittent bradycardia this stay.  Note drop to 40's on 5/2 after beta-blocker was re-introduced, now discontinued.  Echo with EF 45-50%, mild global hypokinesia of LV, dilated LA, 1-2+ MR and TR, Pulm HTN, mod AV stenosis (no prior for comparison). Patient does not appear to be symptomatic but difficult determine with advanced dementia.  Wife states patient had not been on Metoprolol PTA.   - no further beta-blockers  - rates have been stable, will stop  tele      Mechanical Fall    No seizure activity reported. Witnessed by his wife and was reported that he tripped over the steps outside of his home and no LOC reported. Unable to determine from patient if dizzy prior to fall or any other sx's given dementia. No events other than bradycardia on tele.  TTE as above.  - therapies recommend TCU      Essential hypertension:   Patient was only receiving Norvasc 5 mg/d at home. Per PCP note 11/17 patient was suppose to be on BB, ACE and diuretic but had not been filling these.   - continue Norvasc 10 mg daily  - increase hydralazine to 75 mg tid on 5/7    - Will avoid BB due to bradycardia and Ace-I/ARB or diuretic due to SARAHI/CKD.       Peripheral arterial disease:  History of abdominal aortic aneurysm endograft repair about 4 years ago.    - Continue aspirin.      Hypothyroidism:  - Continue levothyroxine 125 mcg daily.      Seizure disorder:   Keppra level <2 on admit. Unclear if he has been taking his keppra as he has not been taking any of his BP meds. Repeat Level 5/4 increased to 7 suggesting he likely had not been taking it.  - Continue Keppra 250 mg QHS.     - Will need a repeat level in 1 week post d/c to ensure compliance.      Dementia:  - continue PTA memantine 10 mg BID, donepezil 10 mg QHS and Celexa 20 mg daily.   - Seroquel 25 mg Bid prn and Qhs    # Pain Assessment:  Current Pain Score 5/8/2018   Patient currently in pain? denies   Pain score (0-10) -   Yuriy s pain level was assessed and he currently denies pain.        DVT Prophylaxis: Pneumatic Compression Devices  Code Status: DNR/DNI       Disposition:  To TCU pending placement.    Significant other, Eva, updated at bedside.    Kike Knight       Interval History   Denies any pain, dyspnea or other complaints.     -Data reviewed today: I reviewed all new labs and imaging results over the last 24 hours. I personally reviewed no images or EKG's today.    Physical Exam   Temp: 97.7  F (36.5  C) Temp  src: Oral BP: 141/62   Heart Rate: 61 Resp: 16 SpO2: 96 % O2 Device: None (Room air)    Vitals:    05/01/18 1928 05/03/18 0157   Weight: 61.7 kg (136 lb) 65.8 kg (145 lb)     Vital Signs with Ranges  Temp:  [95.6  F (35.3  C)-97.8  F (36.6  C)] 97.7  F (36.5  C)  Heart Rate:  [61-74] 61  Resp:  [16-18] 16  BP: (141-174)/(62-81) 141/62  SpO2:  [94 %-97 %] 96 %  I/O last 3 completed shifts:  In: 280 [P.O.:280]  Out: 650 [Urine:650]    Gen: well developed, well nourished male in no acute distress  Heart:  S1S2+, regular rate and rhythm, + 2-3/6 Systolic murmur.  Lungs:  Clear to auscultation, no wheezing, no rales.   Abdomen:  Soft, bowel sounds positive.  Extremities:  1+ distal lower extremity edema.    Medications       amLODIPine (NORVASC) tablet 10 mg  10 mg Oral Daily     citalopram (celeXA) tablet 20 mg  20 mg Oral Daily     donepezil (ARICEPT) tablet 10 mg  10 mg Oral At Bedtime     finasteride  5 mg Oral Daily     hydrALAZINE  75 mg Oral Q8H RAMIRO     levETIRAcetam  250 mg Oral At Bedtime     levothyroxine (SYNTHROID/LEVOTHROID) tablet 125 mcg  125 mcg Oral QAM AC     memantine  10 mg Oral BID     QUEtiapine  25 mg Oral At Bedtime       Data     Recent Labs  Lab 05/08/18  0829 05/07/18  1042 05/05/18  0700  05/04/18  0705  05/02/18  0627 05/01/18  1715   WBC  --   --   --   --   --   --  7.0 8.5   HGB  --   --   --   --  9.0*  --  9.0* 10.1*   MCV  --   --   --   --   --   --  88 88   PLT  --   --   --   --   --   --  109* 138*   INR  --   --   --   --   --   --   --  1.15*    142 144  < > 141  < > 145* 143   POTASSIUM 3.5 3.5 3.8  --  4.2  < > 4.3 4.5   CHLORIDE 114* 112* 114*  --  112*  < > 115* 111*   CO2 21 21 23  --  20  < > 21 23   BUN 44* 38* 43*  --  41*  < > 45* 48*   CR 3.27* 2.95* 2.93*  < > 2.82*  < > 3.09* 3.38*   ANIONGAP 9 9 7  --  9  < > 9 9   TEMO 7.9* 7.8* 7.6*  --  7.6*  < > 7.5* 8.0*   GLC 92 88 86  --  89  < > 98 103*   ALBUMIN  --   --   --   --   --   --   --  2.6*   PROTTOTAL  --    --   --   --   --   --   --  5.7*   BILITOTAL  --   --   --   --   --   --   --  0.3   ALKPHOS  --   --   --   --   --   --   --  74   ALT  --   --   --   --   --   --   --  11   AST  --   --   --   --   --   --   --  14   TROPI  --   --   --   --   --   --   --  0.020   < > = values in this interval not displayed.    No results found for this or any previous visit (from the past 24 hour(s)).

## 2018-05-08 NOTE — PLAN OF CARE
Problem: Patient Care Overview  Goal: Plan of Care/Patient Progress Review  Outcome: No Change  RN: Alert to self, loosely to time and place; very forgetful. Rodriguez catheter in place, pt needs reminders not to attempt to remove. Attempts to void in toilet Q 15-30 minutes despite frequent explanations of catheter. Urine maximiliano colored, clear. Pt has limited insight into present condition. Second degree AV block on tele. Pt also needs reminders to keep tele in place. Compression stockings on for peripheral edema. Ambulated in hallway X 1. SW following for d/c planning.

## 2018-05-08 NOTE — PLAN OF CARE
Problem: Patient Care Overview  Goal: Plan of Care/Patient Progress Review  A/O to self only. Slept well overnight. BP elevated - did not meet parameters for PRN hydralazine, other vitals stable. Pt pulling on catheter intermittently, picks at stat lock. Rash/ abrasion on L. Upper thigh at old stat lock site, sticker changed to other side.  No urine flow to catheter - manipulation required, immediate 450 cc urine return. Some blood noted in urine.  Up with [place] and walker, impulsive.

## 2018-05-08 NOTE — PROGRESS NOTES
[facility] Progress Note  [facility]    , LOS visit. The patient and significant other talked about their long relationship, not  but 30 plus years being together and cohabitating. The SO talked about both of their former domestic relationships, wifes and husbands now . The SO also talked about her children, grand, and great grandchildren and the support they have as a couple. The SO remains [employer]ism. I observed. the patient present affection for SO and revealed a jovial demeanor, joking and kidding the SO and .       provided care in presence/listening and a singing blessing.      Coping with support     remains available as requested.            Landon Chapin  Chaplain Resident

## 2018-05-08 NOTE — PROGRESS NOTES
KIMBERLY spoke with [facility] admissions and they stated they needed a HCD on file before accepting patient today. Kimberly explained that patient is alert to self only and SW would not be able to get a HCD as we could not get it notarized. Hector stated that pt just needed 2 witnesses. Again, SW explained that patient is not able to complete form as he is not decisional so having him complete it and having 2 witnesses would be unethical. SW asked if we could get facility a next of kin if they could accept. Admissions stated they needed a HCD. SW explained that we would look elsewhere for a TCU. SW spoke with patient's SO to explain to her that the form she provided was not a legal HCD. SO stated she must have grabbed wrong form and would go get the correct one. SW also discussed the need for a memory care TCU. SO will discuss alternate options once she arrives back at hospital.     ADDENDUM  I: SW received POA paperwork from SO. SW discussed with SO other alternate TCU options. SO would like a referral sent to [facility]. If patient does not get accepted at a close facility, SO would like to take patient home. SW will follow up after assessment is complete.

## 2018-05-08 NOTE — PLAN OF CARE
Problem: Patient Care Overview  Goal: Plan of Care/Patient Progress Review  Outcome: No Change  Pt alert but pleasantly confused, calm, and cooperative this shift. Oriented to self only. Hypertensive but stable. On RA. Denies pain. Rodriguez patent, draining adequate urine; however, creatinine continues to rise. Urine color slightly red, which is unchanged from previous shift. (per report, pt occasionally has a tendency to pull on catheter). This afternoon, urine color appears to be clearing up. Denies pain. BLE edematous/ compression stockings on. Tolerating diet; up to a recliner chair for dinner with assist of 1/walker/ cooperative. SO visited during the day/supportive; plan for discharge to TCU pending; will continue to monitor.

## 2018-05-09 LAB
ANION GAP SERPL CALCULATED.3IONS-SCNC: 8 MMOL/L (ref 3–14)
BUN SERPL-MCNC: 44 MG/DL (ref 7–30)
CALCIUM SERPL-MCNC: 7.7 MG/DL (ref 8.5–10.1)
CHLORIDE SERPL-SCNC: 113 MMOL/L (ref 94–109)
CO2 SERPL-SCNC: 23 MMOL/L (ref 20–32)
CREAT SERPL-MCNC: 3.34 MG/DL (ref 0.66–1.25)
GFR SERPL CREATININE-BSD FRML MDRD: 17 ML/MIN/1.7M2
GLUCOSE SERPL-MCNC: 116 MG/DL (ref 70–99)
POTASSIUM SERPL-SCNC: 3.5 MMOL/L (ref 3.4–5.3)
SODIUM SERPL-SCNC: 144 MMOL/L (ref 133–144)

## 2018-05-09 PROCEDURE — 25000132 ZZH RX MED GY IP 250 OP 250 PS 637: Mod: GY | Performed by: HOSPITALIST

## 2018-05-09 PROCEDURE — 25000132 ZZH RX MED GY IP 250 OP 250 PS 637: Mod: GY

## 2018-05-09 PROCEDURE — 25000132 ZZH RX MED GY IP 250 OP 250 PS 637: Mod: GY | Performed by: INTERNAL MEDICINE

## 2018-05-09 PROCEDURE — 36415 COLL VENOUS BLD VENIPUNCTURE: CPT | Performed by: HOSPITALIST

## 2018-05-09 PROCEDURE — A9270 NON-COVERED ITEM OR SERVICE: HCPCS | Mod: GY | Performed by: PHYSICIAN ASSISTANT

## 2018-05-09 PROCEDURE — A9270 NON-COVERED ITEM OR SERVICE: HCPCS | Mod: GY | Performed by: INTERNAL MEDICINE

## 2018-05-09 PROCEDURE — 25000132 ZZH RX MED GY IP 250 OP 250 PS 637: Mod: GY | Performed by: PHYSICIAN ASSISTANT

## 2018-05-09 PROCEDURE — A9270 NON-COVERED ITEM OR SERVICE: HCPCS | Mod: GY | Performed by: UROLOGY

## 2018-05-09 PROCEDURE — A9270 NON-COVERED ITEM OR SERVICE: HCPCS | Mod: GY | Performed by: HOSPITALIST

## 2018-05-09 PROCEDURE — A9270 NON-COVERED ITEM OR SERVICE: HCPCS | Mod: GY

## 2018-05-09 PROCEDURE — 99232 SBSQ HOSP IP/OBS MODERATE 35: CPT | Performed by: HOSPITALIST

## 2018-05-09 PROCEDURE — 12000000 ZZH R&B MED SURG/OB

## 2018-05-09 PROCEDURE — 25000132 ZZH RX MED GY IP 250 OP 250 PS 637: Mod: GY | Performed by: UROLOGY

## 2018-05-09 PROCEDURE — 80048 BASIC METABOLIC PNL TOTAL CA: CPT | Performed by: HOSPITALIST

## 2018-05-09 RX ORDER — HYDRALAZINE HYDROCHLORIDE 50 MG/1
100 TABLET, FILM COATED ORAL EVERY 8 HOURS SCHEDULED
Status: DISCONTINUED | OUTPATIENT
Start: 2018-05-09 | End: 2018-05-11

## 2018-05-09 RX ORDER — MEMANTINE HYDROCHLORIDE 5 MG/1
5 TABLET ORAL 2 TIMES DAILY
Status: DISCONTINUED | OUTPATIENT
Start: 2018-05-09 | End: 2018-05-09

## 2018-05-09 RX ORDER — MEMANTINE HYDROCHLORIDE 10 MG/1
10 TABLET ORAL 2 TIMES DAILY
Status: DISCONTINUED | OUTPATIENT
Start: 2018-05-09 | End: 2018-05-15 | Stop reason: HOSPADM

## 2018-05-09 RX ADMIN — HYDRALAZINE HYDROCHLORIDE 75 MG: 50 TABLET ORAL at 06:22

## 2018-05-09 RX ADMIN — QUETIAPINE FUMARATE 25 MG: 25 TABLET ORAL at 10:40

## 2018-05-09 RX ADMIN — MEMANTINE 10 MG: 10 TABLET ORAL at 21:20

## 2018-05-09 RX ADMIN — AMLODIPINE BESYLATE 10 MG: 10 TABLET ORAL at 10:39

## 2018-05-09 RX ADMIN — QUETIAPINE FUMARATE 25 MG: 25 TABLET ORAL at 01:16

## 2018-05-09 RX ADMIN — MEMANTINE 10 MG: 10 TABLET ORAL at 10:39

## 2018-05-09 RX ADMIN — LEVETIRACETAM 250 MG: 250 TABLET, FILM COATED ORAL at 21:20

## 2018-05-09 RX ADMIN — QUETIAPINE FUMARATE 25 MG: 25 TABLET ORAL at 21:20

## 2018-05-09 RX ADMIN — HYDRALAZINE HYDROCHLORIDE 75 MG: 50 TABLET ORAL at 13:28

## 2018-05-09 RX ADMIN — FINASTERIDE 5 MG: 5 TABLET, FILM COATED ORAL at 10:39

## 2018-05-09 RX ADMIN — LEVOTHYROXINE SODIUM 125 MCG: 125 TABLET ORAL at 06:22

## 2018-05-09 RX ADMIN — Medication 1 MG: at 01:16

## 2018-05-09 RX ADMIN — DONEPEZIL HYDROCHLORIDE 10 MG: 10 TABLET ORAL at 21:20

## 2018-05-09 RX ADMIN — CITALOPRAM HYDROBROMIDE 20 MG: 20 TABLET ORAL at 10:39

## 2018-05-09 RX ADMIN — HYDRALAZINE HYDROCHLORIDE 100 MG: 50 TABLET ORAL at 21:20

## 2018-05-09 NOTE — PLAN OF CARE
Problem: Patient Care Overview  Goal: Plan of Care/Patient Progress Review  Outcome: No Change  Pt alert, oriented to self only. Impulsive. VSS on RA. Low fat, low Na diet, no caffeine. Up A1 with GB and walker. Rash to L thigh from stat lock. Pt fidgeting and pulling at IV and catheter. Stat lock for ware needed to be replaced multiple times. Pt pulled out IV, no IV access, no scheduled IV medications. PRN Seroquel given this shift to help with restlessness and agitation. Strict I/O. Plan to discharge to TCU pending placement. Will continue to monitor.

## 2018-05-09 NOTE — PLAN OF CARE
Problem: Patient Care Overview  Goal: Plan of Care/Patient Progress Review  Outcome: No Change  Pt alert to self only. VSS. IV LLOYD. Michael WDL at this time. Started setting off alarm around 1900. Scheduled Seroquel given. Up to chair for dinner. Plan to d/c once TCU placement found. Will continue to monitor.

## 2018-05-09 NOTE — PROGRESS NOTES
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      CKD  Previous Baseline 1.4? (stable in this range 2014 and 2016), Cr 2.0 in CareEverywhere 11/17.  UA with > 182 RBC (possibly from traumatic cath in the ED) and >300 protein (also note UA 11/2017 with >300 protein and moderate blood).  CT stone negative for CT or hydronephrosis, but noted very large prostate.  Renal US negative for hydronephrosis.  No significant improvement in function following ware placement.  Suspect CKD due to uncontrolled HTN and PVD.  In discussion with wife patient had only been taking Norvasc for BP PTA. No ACE/ARB or diuretic or BB as had been prescribed by PCP. Denies NSAIDs.  - had transiently decreased to 2.9 while on IVF; now up to Cr 3.3 on 5/9 while off fluids, continue to monitor  - will need close follow up with Neph for further work-up and monitoring    BPH   Ware placed 5/3.  Urology recommends continuing ware until seen in f/u with Dr. Pizarro 5/15/18.  PTA Cardura stopped due to his falls and he was placed on Finasteride 5 mg daily this stay.  - continue ware and cardura      Sinus Bradycardia  EKG on admission with HR 40-50s with Mobitz, 1.  Note drop to 40's on 5/2 after beta-blocker was re-introduced, now discontinued.  Echo with EF 45-50%, mild global hypokinesia of LV, dilated LA, 1-2+ MR and TR, Pulm HTN, mod AV stenosis (no prior for comparison). Patient does not appear to be symptomatic but difficult determine with advanced dementia.  SO states patient had not been on Metoprolol PTA.   - no further beta-blockers  - rates  have been stable      Mechanical Fall    No seizure activity reported. Witnessed by his wife and was reported that he tripped over the steps outside of his home and no LOC reported. Unable to determine from patient if dizzy prior to fall or any other sx's given dementia. No events other than bradycardia on tele.  TTE as above.  - therapies recommend TCU      Essential hypertension:   Patient was only receiving Norvasc 5 mg/d at home. Per PCP note 11/17 patient was suppose to be on BB, ACE and diuretic but had not been filling these.   - continue Norvasc 10 mg daily  - increase hydralazine to 100 mg tid on 5/9   - Will avoid BB due to bradycardia and Ace-I/ARB or diuretic due to SARAHI/CKD.       Peripheral arterial disease:  History of abdominal aortic aneurysm endograft repair about 4 years ago.    - Continue aspirin.      Hypothyroidism:  - Continue levothyroxine 125 mcg daily.      Seizure disorder:   Keppra level <2 on admit. Unclear if he has been taking his keppra as he has not been taking any of his BP meds. Repeat Level 5/4 increased to 7 suggesting he likely had not been taking it.  - Continue Keppra 250 mg QHS.     - repeat Keppra level in AM     Dementia:  - continue PTA memantine 10 mg BID, donepezil 10 mg QHS and Celexa 20 mg daily.   - Seroquel 25 mg Bid prn and Qhs    # Pain Assessment:  Current Pain Score 5/9/2018   Patient currently in pain? denies   Pain score (0-10) -   Yuriy knapp pain level was assessed and he currently denies pain.        DVT Prophylaxis: Pneumatic Compression Devices  Code Status: DNR/DNI     Disposition:  To TCU pending placement.    Significant other updated at bedside on 5/9.      Kike Knight       Interval History   Patient offers no complaints.  Remains confused.  No pain complaints, no dyspnea or fever/chills.    -Data reviewed today: I reviewed all new labs and imaging results over the last 24 hours. I personally reviewed no images or EKG's today.    Physical Exam   Temp:  97.9  F (36.6  C) Temp src: Oral BP: 141/63   Heart Rate: 73 Resp: 16 SpO2: 97 % O2 Device: None (Room air)    Vitals:    05/01/18 1928 05/03/18 0157   Weight: 61.7 kg (136 lb) 65.8 kg (145 lb)     Vital Signs with Ranges  Temp:  [96.8  F (36  C)-99.3  F (37.4  C)] 97.9  F (36.6  C)  Heart Rate:  [66-73] 73  Resp:  [16-18] 16  BP: (141-163)/(63-76) 141/63  SpO2:  [94 %-97 %] 97 %  I/O last 3 completed shifts:  In: 1380 [P.O.:1380]  Out: 1075 [Urine:1075]    Gen: well developed, well nourished male in no acute distress  Heart:  S1S2+, regular rate and rhythm, + 2-3/6 Systolic murmur.  Lungs:  Clear to auscultation, no wheezing, no rales.   Abdomen:  Soft, bowel sounds positive.  Extremities:  no distal lower extremity edema.  Neuro:  Alert, pleasantly confused, cranial nerves grossly intact    Medications       amLODIPine (NORVASC) tablet 10 mg  10 mg Oral Daily     citalopram (celeXA) tablet 20 mg  20 mg Oral Daily     donepezil (ARICEPT) tablet 10 mg  10 mg Oral At Bedtime     finasteride  5 mg Oral Daily     hydrALAZINE  100 mg Oral Q8H RAMIRO     levETIRAcetam  250 mg Oral At Bedtime     levothyroxine (SYNTHROID/LEVOTHROID) tablet 125 mcg  125 mcg Oral QAM AC     memantine  10 mg Oral BID     QUEtiapine  25 mg Oral At Bedtime       Data     Recent Labs  Lab 05/09/18  1312 05/08/18  0829 05/07/18  1042  05/04/18  0705   HGB  --   --   --   --  9.0*    144 142  < > 141   POTASSIUM 3.5 3.5 3.5  < > 4.2   CHLORIDE 113* 114* 112*  < > 112*   CO2 23 21 21  < > 20   BUN 44* 44* 38*  < > 41*   CR 3.34* 3.27* 2.95*  < > 2.82*   ANIONGAP 8 9 9  < > 9   TEMO 7.7* 7.9* 7.8*  < > 7.6*   * 92 88  < > 89   < > = values in this interval not displayed.    No results found for this or any previous visit (from the past 24 hour(s)).

## 2018-05-09 NOTE — PLAN OF CARE
Problem: Urine Elimination Impaired (Adult)  Goal: Identify Related Risk Factors and Signs and Symptoms  Related risk factors and signs and symptoms are identified upon initiation of Human Response Clinical Practice Guideline (CPG).   Outcome: No Change  Pt alert, oriented to self only. Hypertensive, scheduled meds given. Low fat, low Na diet, no caffeine good appetite. Up A1 with GB and walker. Rash to L thigh from stat lock.  Pt pulled out IV, no IV access, no scheduled IV medications MD aware and okay with it. PRN Seroquel given x 1 this shift to help with restlessness. Strict I/O. Plan to discharge to TCU pending placement. Will continue to monitor.

## 2018-05-09 NOTE — PLAN OF CARE
Problem: Patient Care Overview  Goal: Plan of Care/Patient Progress Review  PT-  Pt adamantly refused PT at time of appt.  Strong encouragement given but pt unwilling to get out of bed at this time.

## 2018-05-09 NOTE — PROGRESS NOTES
KIMBERLY  I: KIMBERLY called and followed up with admissions from [facility]. Admissions is still assessing. KIMBERLY will update patient's SO when assessment is complete.    ADDENDUM  I: KIMBERLY was updated that [facility] has no beds available. SW will need additional choices. SO is requesting a referral be sent to [facility].     ADDENDUM  I: [facility] accepted. KIMBERLY will anticipate patient will d/c tomorrow. KIMBERLY will arrange transport for patient. Patient would need a stretcher as he has dementia and needs continuous physical monitoring in the case he gets restless during transport. KIMBERLY arranged stretcher for 1300 on 5/10. KIMBERLY called and left message with SO. KIMBERLY will fax orders/PAS/scripts when complete.

## 2018-05-10 ENCOUNTER — APPOINTMENT (OUTPATIENT)
Dept: PHYSICAL THERAPY | Facility: CLINIC | Age: 83
DRG: 682 | End: 2018-05-10
Payer: MEDICARE

## 2018-05-10 LAB
ALBUMIN UR-MCNC: 300 MG/DL
ANION GAP SERPL CALCULATED.3IONS-SCNC: 7 MMOL/L (ref 3–14)
APPEARANCE UR: ABNORMAL
BILIRUB UR QL STRIP: NEGATIVE
BUN SERPL-MCNC: 50 MG/DL (ref 7–30)
CALCIUM SERPL-MCNC: 7.6 MG/DL (ref 8.5–10.1)
CHLORIDE SERPL-SCNC: 112 MMOL/L (ref 94–109)
CO2 SERPL-SCNC: 24 MMOL/L (ref 20–32)
COLOR UR AUTO: YELLOW
CREAT SERPL-MCNC: 3.55 MG/DL (ref 0.66–1.25)
CREAT UR-MCNC: 110 MG/DL
GFR SERPL CREATININE-BSD FRML MDRD: 16 ML/MIN/1.7M2
GLUCOSE SERPL-MCNC: 93 MG/DL (ref 70–99)
GLUCOSE UR STRIP-MCNC: 70 MG/DL
HGB UR QL STRIP: ABNORMAL
KETONES UR STRIP-MCNC: NEGATIVE MG/DL
LEUKOCYTE ESTERASE UR QL STRIP: ABNORMAL
MUCOUS THREADS #/AREA URNS LPF: PRESENT /LPF
NITRATE UR QL: NEGATIVE
PH UR STRIP: 6 PH (ref 5–7)
POTASSIUM SERPL-SCNC: 4.3 MMOL/L (ref 3.4–5.3)
PROT UR-MCNC: 8.45 G/L
PROT/CREAT 24H UR: 7.68 G/G CR (ref 0–0.2)
RBC #/AREA URNS AUTO: 94 /HPF (ref 0–2)
SODIUM SERPL-SCNC: 143 MMOL/L (ref 133–144)
SOURCE: ABNORMAL
SP GR UR STRIP: 1.01 (ref 1–1.03)
UROBILINOGEN UR STRIP-MCNC: NORMAL MG/DL (ref 0–2)
WBC #/AREA URNS AUTO: 21 /HPF (ref 0–5)
YEAST #/AREA URNS HPF: ABNORMAL /HPF

## 2018-05-10 PROCEDURE — 12000000 ZZH R&B MED SURG/OB

## 2018-05-10 PROCEDURE — 25000132 ZZH RX MED GY IP 250 OP 250 PS 637: Mod: GY | Performed by: INTERNAL MEDICINE

## 2018-05-10 PROCEDURE — A9270 NON-COVERED ITEM OR SERVICE: HCPCS | Mod: GY | Performed by: INTERNAL MEDICINE

## 2018-05-10 PROCEDURE — 36415 COLL VENOUS BLD VENIPUNCTURE: CPT | Performed by: HOSPITALIST

## 2018-05-10 PROCEDURE — A9270 NON-COVERED ITEM OR SERVICE: HCPCS | Mod: GY

## 2018-05-10 PROCEDURE — 97530 THERAPEUTIC ACTIVITIES: CPT | Mod: GP | Performed by: PHYSICAL THERAPY ASSISTANT

## 2018-05-10 PROCEDURE — 40000193 ZZH STATISTIC PT WARD VISIT: Performed by: PHYSICAL THERAPY ASSISTANT

## 2018-05-10 PROCEDURE — 25000132 ZZH RX MED GY IP 250 OP 250 PS 637: Mod: GY | Performed by: UROLOGY

## 2018-05-10 PROCEDURE — 84156 ASSAY OF PROTEIN URINE: CPT | Performed by: INTERNAL MEDICINE

## 2018-05-10 PROCEDURE — 25000132 ZZH RX MED GY IP 250 OP 250 PS 637: Mod: GY | Performed by: PHYSICIAN ASSISTANT

## 2018-05-10 PROCEDURE — 81001 URINALYSIS AUTO W/SCOPE: CPT | Performed by: INTERNAL MEDICINE

## 2018-05-10 PROCEDURE — 25000132 ZZH RX MED GY IP 250 OP 250 PS 637: Mod: GY

## 2018-05-10 PROCEDURE — A9270 NON-COVERED ITEM OR SERVICE: HCPCS | Mod: GY | Performed by: HOSPITALIST

## 2018-05-10 PROCEDURE — 97116 GAIT TRAINING THERAPY: CPT | Mod: GP | Performed by: PHYSICAL THERAPY ASSISTANT

## 2018-05-10 PROCEDURE — A9270 NON-COVERED ITEM OR SERVICE: HCPCS | Mod: GY | Performed by: UROLOGY

## 2018-05-10 PROCEDURE — A9270 NON-COVERED ITEM OR SERVICE: HCPCS | Mod: GY | Performed by: PHYSICIAN ASSISTANT

## 2018-05-10 PROCEDURE — 80048 BASIC METABOLIC PNL TOTAL CA: CPT | Performed by: HOSPITALIST

## 2018-05-10 PROCEDURE — 99232 SBSQ HOSP IP/OBS MODERATE 35: CPT | Performed by: HOSPITALIST

## 2018-05-10 PROCEDURE — 80177 DRUG SCRN QUAN LEVETIRACETAM: CPT | Performed by: HOSPITALIST

## 2018-05-10 PROCEDURE — 25000132 ZZH RX MED GY IP 250 OP 250 PS 637: Mod: GY | Performed by: HOSPITALIST

## 2018-05-10 RX ORDER — QUETIAPINE FUMARATE 25 MG/1
25 TABLET, FILM COATED ORAL AT BEDTIME
Status: DISCONTINUED | OUTPATIENT
Start: 2018-05-10 | End: 2018-05-15 | Stop reason: HOSPADM

## 2018-05-10 RX ADMIN — DONEPEZIL HYDROCHLORIDE 10 MG: 10 TABLET ORAL at 21:33

## 2018-05-10 RX ADMIN — QUETIAPINE FUMARATE 25 MG: 25 TABLET ORAL at 06:28

## 2018-05-10 RX ADMIN — LEVETIRACETAM 250 MG: 250 TABLET, FILM COATED ORAL at 21:33

## 2018-05-10 RX ADMIN — HYDRALAZINE HYDROCHLORIDE 100 MG: 50 TABLET ORAL at 13:37

## 2018-05-10 RX ADMIN — MEMANTINE 10 MG: 10 TABLET ORAL at 11:18

## 2018-05-10 RX ADMIN — MEMANTINE 10 MG: 10 TABLET ORAL at 19:28

## 2018-05-10 RX ADMIN — QUETIAPINE FUMARATE 25 MG: 25 TABLET ORAL at 19:28

## 2018-05-10 RX ADMIN — HYDRALAZINE HYDROCHLORIDE 100 MG: 50 TABLET ORAL at 21:33

## 2018-05-10 RX ADMIN — CITALOPRAM HYDROBROMIDE 20 MG: 20 TABLET ORAL at 11:18

## 2018-05-10 RX ADMIN — LEVOTHYROXINE SODIUM 125 MCG: 125 TABLET ORAL at 06:27

## 2018-05-10 RX ADMIN — FINASTERIDE 5 MG: 5 TABLET, FILM COATED ORAL at 11:18

## 2018-05-10 RX ADMIN — AMLODIPINE BESYLATE 10 MG: 10 TABLET ORAL at 11:18

## 2018-05-10 RX ADMIN — HYDRALAZINE HYDROCHLORIDE 100 MG: 50 TABLET ORAL at 06:28

## 2018-05-10 NOTE — PLAN OF CARE
Problem: Patient Care Overview  Goal: Plan of Care/Patient Progress Review  Discharge Planner PT   Patient plan for discharge: Not stated  Current status: Pt needed encouragement but was agreeable to PT.  Pt transferred supine to sit with min assist and sit to/from stand with min assist.  Pt is unsteady in standing.  Gait training performed x 220 ft using wheeled walker and CGA-min assist for balance.  Pt is unsteady and his knees buckle slightly at times.  Cues needed to stay close to walker.  Barriers to return to prior living situation: High falls risk, needs assist with all mobility, confusion limiting safety.  Recommendations for discharge: TCU per plan established by the PT.    Rationale for recommendations: Pt requires assist with mobility and is unsteady during gait.  Continued PT needed to progress strength and independence with mobility.        Entered by: Amna Laura 05/10/2018 4:02 PM

## 2018-05-10 NOTE — PLAN OF CARE
Problem: Patient Care Overview  Goal: Plan of Care/Patient Progress Review  PT: Discussed with PTA, extended goal date, goals remain appropriate at this time.

## 2018-05-10 NOTE — PLAN OF CARE
Problem: Patient Care Overview  Goal: Plan of Care/Patient Progress Review  Outcome: No Change  Pt is alert. Oriented to self only. Impulsive and restless, PRN Seroquel given this AM. VSS, slightly hypertensive, does not meet parameters for PRN's. Low fat, low Na and no caffeine diet. Up A1 with walker and TOBIAS. Rodriguez patent w/ clear maximiliano out put, pt pull off stat lock frequently. No IV access, MD aware. Plan for dc to [facility] today, transportation set up for 1300. Will continue to monitor.

## 2018-05-10 NOTE — PLAN OF CARE
Problem: Urine Elimination Impaired (Adult)  Goal: Identify Related Risk Factors and Signs and Symptoms  Related risk factors and signs and symptoms are identified upon initiation of Human Response Clinical Practice Guideline (CPG).   Outcome: No Change  Pt is alert. Oriented to self only. Impulsive and restless. VSS, slightly hypertensive, on scheduled hydralazine and norvasc.  Low fat, low Na and no caffeine diet. Up A1 with walker and GB. Rodriguez patent w/ clear yellow urine, pt pull off stat lock frequently. No IV access, MD aware. Significant other not wanting patient to DC to TCU at this time so DC plan pending.

## 2018-05-10 NOTE — PROGRESS NOTES
SW:  D:  Spoke with Lexus  on [address], regarding discharge plans for patient.  Lexus states that patient's significant other no longer wants to work with her therefore she is asking for another .  Call placed to patient's significant other to discuss discharge plans.  There was no answer and no answering machine.  Will try again a little later.  P:  Will continue to follow.

## 2018-05-10 NOTE — PROGRESS NOTES
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      SARAHI on CKD  Previous Baseline 1.4? (stable in this range 2014 and 2016), Cr 2.0 in CareEverywhere 11/17.  Admission UA with > 182 RBC (possibly from traumatic cath in the ED) and >300 protein (also note UA 11/2017 with >300 protein and moderate blood).  CT stone negative for CT or hydronephrosis, but noted very large prostate.  Renal US negative for hydronephrosis.  No significant improvement in function following ware placement.  Suspect CKD due to uncontrolled HTN.  Had only been taking Norvasc for BP PTA. No ACE/ARB or diuretic or BB as had been prescribed by PCP. Denies NSAIDs.  - Cr had transiently decreased to 2.9 while on IVF; trending up once IVF stopped and currently 3.55 - ?pre-renal with increasing BUN as well  - Nephrology consult    BPH   Ware placed 5/3.  Urology recommends continuing ware until seen in f/u with Dr. Pizarro 5/15/18.  PTA Cardura stopped due to his falls and he was placed on Finasteride 5 mg daily this stay.  - continue ware and cardura    Essential hypertension:   Patient was only receiving Norvasc 5 mg/d at home. Was suppose to be on BB, ACE and diuretic but had not been filling these.   - continue Norvasc 10 mg daily  - increased hydralazine to 100 mg tid on 5/9; still no significant change in blood pressure  - avoid BB due to bradycardia and Ace-I/ARB or diuretic due to CKD      Peripheral arterial disease:  History of AAA endograft repair about 4 years ago.    - Continue aspirin.      Sinus Bradycardia  EKG on admission with HR 40-50s  with Lukasz, 1.  Note drop to 40's on 5/2 after beta-blocker was re-introduced, now discontinued.  Echo with EF 45-50%, mild global hypokinesia of LV, dilated LA, 1-2+ MR and TR, Pulm HTN, mod AV stenosis (no prior for comparison). Patient does not appear to be symptomatic but difficult determine with advanced dementia.  SO states patient had not been on Metoprolol PTA.   - no further beta-blockers  - rates have been stable      Mechanical Fall    No seizure activity reported. Witnessed by his wife and was reported that he tripped over the steps outside of his home and no LOC reported. Unable to determine from patient if dizzy prior to fall or any other sx's given dementia. No events other than bradycardia on tele.  TTE as above.  - therapies recommend TCU      Hypothyroidism:  - Continue levothyroxine 125 mcg daily      Seizure disorder:   Keppra level <2 on admit. Unclear if he has been taking his keppra as he has not been taking any of his BP meds. Repeat Level 5/4 increased to 7 suggesting he likely had not been taking it.  - Continue Keppra 250 mg QHS.     - repeat Keppra level 5/10 pending     Dementia:  - continue PTA memantine 10 mg BID, donepezil 10 mg QHS and Celexa 20 mg daily.   - Seroquel 25 mg Bid prn  - will adjust evening Seroquel dose from bedtime to 1900 as RN reporting this is when he starts becoming more agitated  - receiving prn seroquel at least daily; will check EKG in AM to reassess QTc    # Pain Assessment:  Current Pain Score 5/10/2018   Patient currently in pain? sleeping: patient not able to self report   Pain score (0-10) -   Yuriy s pain level was assessed and he currently denies pain.        DVT Prophylaxis: Pneumatic Compression Devices  Code Status: DNR/DNI     Disposition:  Difficult situation as patient is reportedly estranged from son and we have no contact information.  Significant other, Eva, currently refusing to sign him into TCU, but is also not a legally designated power  of  so unclear if she has authority to take him home to what may be an unsafe situation ([facility] adult protective services is already following patient).  Social work involved, their assistance much appreciated.      Kike Knight       Interval History   Patient offers no complaints.  He states he is taking good PO.  Deneis any pain or dyspnea.  No other complaints.    -Data reviewed today: I reviewed all new labs and imaging results over the last 24 hours. I personally reviewed no images or EKG's today.    Physical Exam   Temp: 96.9  F (36.1  C) Temp src: Oral BP: 162/66   Heart Rate: 61 Resp: 18 SpO2: 96 % O2 Device: None (Room air)    Vitals:    05/01/18 1928 05/03/18 0157   Weight: 61.7 kg (136 lb) 65.8 kg (145 lb)     Vital Signs with Ranges  Temp:  [96.9  F (36.1  C)-99  F (37.2  C)] 96.9  F (36.1  C)  Heart Rate:  [58-69] 61  Resp:  [16-18] 18  BP: (134-171)/(62-80) 162/66  SpO2:  [94 %-96 %] 96 %  I/O last 3 completed shifts:  In: 590 [P.O.:590]  Out: 900 [Urine:900]    Gen: well developed, well nourished male in no acute distress, lying in bed  Heart:  S1S2+, regular rate and rhythm, + 2-3/6 Systolic murmur.  Lungs:  Clear to auscultation, no wheezing, no rales.   Abdomen:  Soft, bowel sounds positive.  Extremities:  Trace distal lower extremity edema.  Neuro:  Alert, pleasantly confused, cranial nerves grossly intact    Medications       amLODIPine (NORVASC) tablet 10 mg  10 mg Oral Daily     citalopram (celeXA) tablet 20 mg  20 mg Oral Daily     donepezil (ARICEPT) tablet 10 mg  10 mg Oral At Bedtime     finasteride  5 mg Oral Daily     hydrALAZINE  100 mg Oral Q8H RAMIRO     levETIRAcetam  250 mg Oral At Bedtime     levothyroxine (SYNTHROID/LEVOTHROID) tablet 125 mcg  125 mcg Oral QAM AC     memantine  10 mg Oral BID     QUEtiapine  25 mg Oral At Bedtime       Data     Recent Labs  Lab 05/10/18  0659 05/09/18  1312 05/08/18  0829  05/04/18  0705   HGB  --   --   --   --  9.0*    835  144  < > 141   POTASSIUM 4.3 3.5 3.5  < > 4.2   CHLORIDE 112* 113* 114*  < > 112*   CO2 24 23 21  < > 20   BUN 50* 44* 44*  < > 41*   CR 3.55* 3.34* 3.27*  < > 2.82*   ANIONGAP 7 8 9  < > 9   TEMO 7.6* 7.7* 7.9*  < > 7.6*   GLC 93 116* 92  < > 89   < > = values in this interval not displayed.    No results found for this or any previous visit (from the past 24 hour(s)).

## 2018-05-10 NOTE — PLAN OF CARE
Problem: Urine Elimination Impaired (Adult)  Goal: Effective Urinary Elimination  Patient will demonstrate the desired outcomes by discharge/transition of care.   Outcome: No Change  Hypertensive, did not reach PRN parameters. Orientated to self only. Impulsive at times, calm/cooperative this shift. Rodriguez patent w/ clear maximiliano out put, pt likes to pull off stat lock frequently.  Plan for dc to [facility] tomorrow, transportation set up for 1300.

## 2018-05-10 NOTE — PROGRESS NOTES
"NATALIIA  I: NATALIIA spoke with Eva to discuss d/c plan. Eva was ok with plan and understands that a stretcher ride has been arranged and that they may receive bill for transport. Eva will plan to follow w/c van to [facility].     P: NATALIIA will continue to follow and assist as needed.      ADDENDUM  I: NATALIIA received call from Eva who is now stating she does not want patient to go to TCU and she wants to take him home. NATALIIA explained that Eva had recently stated she could not manage him at home and NATALIIA did not feel it was a safe d/c plan. Eva stated that she is not signing him into a TCU as this is taking away his independence and he will never return home. NATALIIA explained that patient could d/c home with HC and we would make a referral to Adult protection. SO stated \"You cannot threaten me with the county\". SO feels Patient is not going to return home, NATALIIA explained that TCU is very short term and it is to get him stronger before returning home. Eva asked for it in writing. NATALIIA explained that the MD orders will states Short term rehab, she can get a copy of this. NATALIIA will plan to meet with Eva when she arrives at hospital.     ADDENDUM  \I: NATALIIA met with SO who stated she will not sign patient in at TCU. SW explained that NATALIIA does not feel patient is safe to d/c home. NATALIIA asked for additional family member names to contact to discuss d/c plan with.  SO  stated that NATALIIA is forbidden to contact his son as he is a drug addict. SO stated that she doesn't feel well and will not sign him in because of this. SO requested someone else sign him into TCU as she does not want to be responsible for doing so. Nataliia stated that we don't have many options available and if needed would need to either look into next of kin or file for guardianship-which is not preferred. SO stated that SW cannot threaten her with guardianship. SW explained that it is not meant to threaten patient but if we have no decision maker, we cannot send patient to a " facility. SO requested another SW be involved in patient care. SW will pass this on. SW received call from [facility] Adult protection worker, [phone], stating that they have a case open for patient. SW requested info be faxed to [facility] before info is provided. [facility] worker requested that SW update with d/c plan.     Lexus Solano, MADDY   *[phone]

## 2018-05-11 ENCOUNTER — APPOINTMENT (OUTPATIENT)
Dept: PHYSICAL THERAPY | Facility: CLINIC | Age: 83
DRG: 682 | End: 2018-05-11
Payer: MEDICARE

## 2018-05-11 LAB
ANION GAP SERPL CALCULATED.3IONS-SCNC: 7 MMOL/L (ref 3–14)
BUN SERPL-MCNC: 58 MG/DL (ref 7–30)
CALCIUM SERPL-MCNC: 7.4 MG/DL (ref 8.5–10.1)
CHLORIDE SERPL-SCNC: 112 MMOL/L (ref 94–109)
CO2 SERPL-SCNC: 23 MMOL/L (ref 20–32)
CREAT SERPL-MCNC: 3.59 MG/DL (ref 0.66–1.25)
GFR SERPL CREATININE-BSD FRML MDRD: 16 ML/MIN/1.7M2
GLUCOSE SERPL-MCNC: 105 MG/DL (ref 70–99)
LEVETIRACETAM SERPL-MCNC: 12 UG/ML (ref 12–46)
MAGNESIUM SERPL-MCNC: 2 MG/DL (ref 1.6–2.3)
PHOSPHATE SERPL-MCNC: 3 MG/DL (ref 2.5–4.5)
POTASSIUM SERPL-SCNC: 3.9 MMOL/L (ref 3.4–5.3)
PTH-INTACT SERPL-MCNC: 140 PG/ML (ref 18–80)
SODIUM SERPL-SCNC: 142 MMOL/L (ref 133–144)
URATE SERPL-MCNC: 6.7 MG/DL (ref 3.5–7.2)

## 2018-05-11 PROCEDURE — 80048 BASIC METABOLIC PNL TOTAL CA: CPT | Performed by: HOSPITALIST

## 2018-05-11 PROCEDURE — 84550 ASSAY OF BLOOD/URIC ACID: CPT | Performed by: HOSPITALIST

## 2018-05-11 PROCEDURE — 25000132 ZZH RX MED GY IP 250 OP 250 PS 637: Mod: GY

## 2018-05-11 PROCEDURE — 99231 SBSQ HOSP IP/OBS SF/LOW 25: CPT | Performed by: HOSPITALIST

## 2018-05-11 PROCEDURE — 97530 THERAPEUTIC ACTIVITIES: CPT | Mod: GP

## 2018-05-11 PROCEDURE — 25000132 ZZH RX MED GY IP 250 OP 250 PS 637: Mod: GY | Performed by: INTERNAL MEDICINE

## 2018-05-11 PROCEDURE — 25000132 ZZH RX MED GY IP 250 OP 250 PS 637: Mod: GY | Performed by: PHYSICIAN ASSISTANT

## 2018-05-11 PROCEDURE — 40000193 ZZH STATISTIC PT WARD VISIT

## 2018-05-11 PROCEDURE — 97116 GAIT TRAINING THERAPY: CPT | Mod: GP

## 2018-05-11 PROCEDURE — 25000132 ZZH RX MED GY IP 250 OP 250 PS 637: Mod: GY | Performed by: HOSPITALIST

## 2018-05-11 PROCEDURE — A9270 NON-COVERED ITEM OR SERVICE: HCPCS | Mod: GY | Performed by: HOSPITALIST

## 2018-05-11 PROCEDURE — 36415 COLL VENOUS BLD VENIPUNCTURE: CPT | Performed by: HOSPITALIST

## 2018-05-11 PROCEDURE — A9270 NON-COVERED ITEM OR SERVICE: HCPCS | Mod: GY | Performed by: PHYSICIAN ASSISTANT

## 2018-05-11 PROCEDURE — 25000132 ZZH RX MED GY IP 250 OP 250 PS 637: Mod: GY | Performed by: UROLOGY

## 2018-05-11 PROCEDURE — A9270 NON-COVERED ITEM OR SERVICE: HCPCS | Mod: GY | Performed by: INTERNAL MEDICINE

## 2018-05-11 PROCEDURE — 93005 ELECTROCARDIOGRAM TRACING: CPT

## 2018-05-11 PROCEDURE — A9270 NON-COVERED ITEM OR SERVICE: HCPCS | Mod: GY

## 2018-05-11 PROCEDURE — 83735 ASSAY OF MAGNESIUM: CPT | Performed by: HOSPITALIST

## 2018-05-11 PROCEDURE — 83970 ASSAY OF PARATHORMONE: CPT | Performed by: HOSPITALIST

## 2018-05-11 PROCEDURE — A9270 NON-COVERED ITEM OR SERVICE: HCPCS | Mod: GY | Performed by: UROLOGY

## 2018-05-11 PROCEDURE — 93010 ELECTROCARDIOGRAM REPORT: CPT | Performed by: INTERNAL MEDICINE

## 2018-05-11 PROCEDURE — 12000000 ZZH R&B MED SURG/OB

## 2018-05-11 PROCEDURE — 84100 ASSAY OF PHOSPHORUS: CPT | Performed by: HOSPITALIST

## 2018-05-11 RX ORDER — QUETIAPINE FUMARATE 25 MG/1
25 TABLET, FILM COATED ORAL AT BEDTIME
Qty: 60 TABLET | DISCHARGE
Start: 2018-05-11 | End: 2018-05-27

## 2018-05-11 RX ORDER — HYDRALAZINE HYDROCHLORIDE 50 MG/1
100 TABLET, FILM COATED ORAL 4 TIMES DAILY
Status: DISCONTINUED | OUTPATIENT
Start: 2018-05-11 | End: 2018-05-12

## 2018-05-11 RX ORDER — AMLODIPINE BESYLATE 10 MG/1
10 TABLET ORAL DAILY
Qty: 30 TABLET | Refills: 2 | Status: ON HOLD | DISCHARGE
Start: 2018-05-11 | End: 2018-05-30

## 2018-05-11 RX ORDER — HYDRALAZINE HYDROCHLORIDE 100 MG/1
100 TABLET, FILM COATED ORAL 4 TIMES DAILY
Qty: 120 TABLET | Refills: 2 | DISCHARGE
Start: 2018-05-11 | End: 2018-05-15

## 2018-05-11 RX ORDER — ACETAMINOPHEN 325 MG/1
650 TABLET ORAL EVERY 4 HOURS PRN
Qty: 100 TABLET | Status: ON HOLD | DISCHARGE
Start: 2018-05-11 | End: 2018-05-30

## 2018-05-11 RX ORDER — QUETIAPINE FUMARATE 25 MG/1
12.5 TABLET, FILM COATED ORAL 3 TIMES DAILY PRN
Qty: 60 TABLET | DISCHARGE
Start: 2018-05-11 | End: 2018-05-27

## 2018-05-11 RX ORDER — FINASTERIDE 5 MG/1
5 TABLET, FILM COATED ORAL DAILY
Qty: 30 TABLET | Status: ON HOLD | DISCHARGE
Start: 2018-05-12 | End: 2018-05-30

## 2018-05-11 RX ADMIN — HYDRALAZINE HYDROCHLORIDE 100 MG: 50 TABLET ORAL at 21:07

## 2018-05-11 RX ADMIN — AMLODIPINE BESYLATE 10 MG: 10 TABLET ORAL at 10:47

## 2018-05-11 RX ADMIN — LEVETIRACETAM 250 MG: 250 TABLET, FILM COATED ORAL at 21:07

## 2018-05-11 RX ADMIN — HYDRALAZINE HYDROCHLORIDE 100 MG: 50 TABLET ORAL at 17:39

## 2018-05-11 RX ADMIN — FINASTERIDE 5 MG: 5 TABLET, FILM COATED ORAL at 10:47

## 2018-05-11 RX ADMIN — CITALOPRAM HYDROBROMIDE 20 MG: 20 TABLET ORAL at 10:47

## 2018-05-11 RX ADMIN — HYDRALAZINE HYDROCHLORIDE 100 MG: 50 TABLET ORAL at 06:53

## 2018-05-11 RX ADMIN — MEMANTINE 10 MG: 10 TABLET ORAL at 10:47

## 2018-05-11 RX ADMIN — LEVOTHYROXINE SODIUM 125 MCG: 125 TABLET ORAL at 06:53

## 2018-05-11 RX ADMIN — MEMANTINE 10 MG: 10 TABLET ORAL at 21:08

## 2018-05-11 RX ADMIN — DONEPEZIL HYDROCHLORIDE 10 MG: 10 TABLET ORAL at 21:08

## 2018-05-11 RX ADMIN — Medication 12.5 MG: at 01:23

## 2018-05-11 RX ADMIN — QUETIAPINE FUMARATE 25 MG: 25 TABLET ORAL at 21:08

## 2018-05-11 NOTE — PROGRESS NOTES
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      SARAHI on CKD  Previous Baseline 1.4? (stable in this range 2014 and 2016), Cr 2.0 in CareEverywhere 11/17.  Admission UA with > 182 RBC (possibly from traumatic cath in the ED) and >300 protein (also note UA 11/2017 with >300 protein and moderate blood).  CT stone negative for CT or hydronephrosis, but noted very large prostate.  Renal US negative for hydronephrosis.  No significant improvement in function following ware placement.  Suspect CKD due to uncontrolled HTN.  Had only been taking Norvasc for BP PTA. No ACE/ARB or diuretic or BB as had been prescribed by PCP. Denies NSAIDs.  - Cr had transiently decreased to 2.9 while on IVF; trending up once IVF stopped  - Cr stable at 3.5  - Nephrology recs regarding further work-up appreciated    BPH   Ware placed 5/3.  Urology recommends continuing ware until seen in f/u with Dr. Pizarro 5/15/18.  PTA Cardura stopped due to his falls and he was placed on Finasteride 5 mg daily this stay.  - continue ware and cardura    Essential hypertension:   Patient was only receiving Norvasc 5 mg/d at home. Was suppose to be on BB, ACE and diuretic but had not been filling these.   - continue Norvasc 10 mg daily  - blood pressure slightly improved with titration of hydralazine; increase to 100 mg qid on 5/11  - avoid BB due to bradycardia and Ace-I/ARB or diuretic due to CKD      Peripheral arterial disease:  History of AAA endograft repair about 4 years ago.    - Continue aspirin.      Sinus Bradycardia  EKG on admission with HR 40-50s  with Lukasz, 1.  Note drop to 40's on 5/2 after beta-blocker was re-introduced, now discontinued.  Echo with EF 45-50%, mild global hypokinesia of LV, dilated LA, 1-2+ MR and TR, Pulm HTN, mod AV stenosis (no prior for comparison). Patient does not appear to be symptomatic but difficult determine with advanced dementia.  SO states patient had not been on Metoprolol PTA.   - no further beta-blockers, rates have been stable      Mechanical Fall    No seizure activity reported. Witnessed by his wife and was reported that he tripped over the steps outside of his home and no LOC reported. Unable to determine from patient if dizzy prior to fall or any other sx's given dementia. No events other than bradycardia on tele.  TTE as above.  - therapies recommend TCU      Hypothyroidism:  - Continue levothyroxine 125 mcg daily      Seizure disorder:   Keppra level <2 on admit.  Likely due to non-compliance as was placed on PTA dose with therapeutic level noted 5/10.    - Continue Keppra 250 mg QHS     Dementia:  - continue PTA memantine 10 mg BID, donepezil 10 mg QHS and Celexa 20 mg daily.   - Seroquel 25 every evening and 12.5 mg Bid prn  - QTc on 5/11 is 474; monitor intermittently  ADDENDUM:  With prolongation of QTc, will discontinue Celexa    # Pain Assessment:  Current Pain Score 5/11/2018   Patient currently in pain? denies   Pain score (0-10) -   Yuriy knapp pain level was assessed and he currently denies pain.        DVT Prophylaxis: Pneumatic Compression Devices  Code Status: DNR/DNI     Disposition:  To TCU tomorrow.      Kike Knight       Interval History   Patient is feeling well, denies any chest pain/pressure, dyspnea or pain complaints.    -Data reviewed today: I reviewed all new labs and imaging results over the last 24 hours. I personally reviewed no images or EKG's today.    Physical Exam   Temp: 98.1  F (36.7  C) Temp src: Oral BP: 144/69 Pulse: 64 Heart Rate: 69 Resp: 16 SpO2: 95 % O2 Device: None (Room air)     Vitals:    05/01/18 1928 05/03/18 0157   Weight: 61.7 kg (136 lb) 65.8 kg (145 lb)     Vital Signs with Ranges  Temp:  [95.9  F (35.5  C)-98.2  F (36.8  C)] 98.1  F (36.7  C)  Pulse:  [64] 64  Heart Rate:  [58-69] 69  Resp:  [16-18] 16  BP: (143-167)/(65-72) 144/69  SpO2:  [94 %-97 %] 95 %  I/O last 3 completed shifts:  In: 600 [P.O.:600]  Out: 675 [Urine:675]    Gen: well developed, well nourished male in no acute distress, sleeping in bed  Heart:  S1S2+, regular rate and rhythm, + 2-3/6 Systolic murmur.  Lungs:  Clear to auscultation, no wheezing, no rales.   Abdomen:  Soft, bowel sounds positive.  Extremities:  Trace distal lower extremity edema.  Neuro:  Sleeping but arouses easily to voice, pleasantly confused, cranial nerves grossly intact    Medications       amLODIPine (NORVASC) tablet 10 mg  10 mg Oral Daily     citalopram (celeXA) tablet 20 mg  20 mg Oral Daily     donepezil (ARICEPT) tablet 10 mg  10 mg Oral At Bedtime     finasteride  5 mg Oral Daily     hydrALAZINE  100 mg Oral Q8H RAMIRO     levETIRAcetam  250 mg Oral At Bedtime     levothyroxine (SYNTHROID/LEVOTHROID) tablet 125 mcg  125 mcg Oral QAM AC     memantine  10 mg Oral BID     QUEtiapine  25 mg Oral At Bedtime       Data     Recent Labs  Lab 05/11/18  0615 05/10/18  0659 05/09/18  1312    143 144   POTASSIUM 3.9 4.3 3.5   CHLORIDE 112* 112* 113*   CO2 23 24 23   BUN 58* 50* 44*   CR 3.59* 3.55* 3.34*   ANIONGAP 7 7 8   TEMO 7.4* 7.6* 7.7*   * 93 116*       No results found for this or any previous visit (from the past 24 hour(s)).

## 2018-05-11 NOTE — PLAN OF CARE
Problem: Urine Elimination Impaired (Adult)  Goal: Effective Urinary Elimination  Patient will demonstrate the desired outcomes by discharge/transition of care.   Outcome: No Change  VSS. Orientated to self only. Calm and cooperative this shift, less impulsive. Rodriguez patent w/ clear maximiliano out put. Creat trending up, nephrology consulted. Dc plan pending, will need TCU.

## 2018-05-11 NOTE — PROGRESS NOTES
KIMBERLY  I: KIMBERLY spoke with Adult protection worker who would like an update on patient on Monday. KIMBERLY will need to call [phone].     P: KIMBERLY will continue to follow and assist as needed.    ADDENDUM  I: KIMBERLY confirmed [facility] can accept over w/e. Vonnie with admissions is willing to speak with SO if she has questions about facility. SO can contact Vonnie at [phone]. She will be available all weekend.     Lexus Solano, MADDY   *[phone]

## 2018-05-11 NOTE — PLAN OF CARE
Problem: Patient Care Overview  Goal: Plan of Care/Patient Progress Review  Outcome: No Change  A/O to self only. Baseline dementia. Pt impulsive at getting out of bed overnight. Gave prn seroquel x1-effective. VSS on RA. Denied pain throughout shift. Rodriguez with good output. No BM overnight. Ambulates with assist of 1 with gait belt and walker. Per report from evening nurse-significant other verbalized would like referrals for TCU's sent to places that are closer to hospital. Discharge pending. Continue to monitor.

## 2018-05-11 NOTE — PLAN OF CARE
Problem: Patient Care Overview  Goal: Plan of Care/Patient Progress Review  Discharge Planner PT   Patient plan for discharge: Not stated  Current status: Pt initially refusing all attempts to engage pt in participation. Therapist went to notify nursing and heard bed alarm sounding. Responded to bed alarm with RN. Pt standing EOB. Pt redirected and able to sit in chair safely. Pt stating he needs to use bathroom. Assisted pt on/off toilet with CGA for balance and assist for clothing management and pericares. Pt then agreeable to ambulate in hallway. Ambulates 100 ft x 1 with FWW and CGA for balance. Mildly unsteady, needs cues to stay within frame of walker. Pt sitting up in chair at end of session with all needs in reach and chair alarm on.  Barriers to return to prior living situation: High falls risk, needs assist with all mobility, confusion limiting safety.  Recommendations for discharge: TCU per plan established by the PT.    Rationale for recommendations: Pt requires assist with mobility and is unsteady during gait.  Continued PT needed to progress strength and independence with mobility.       Entered by: Yasmin Tovar 05/11/2018 3:29 PM

## 2018-05-11 NOTE — PROGRESS NOTES
Assessment and Plan:   CKD: uncertain etiology. Perhaps due to hypertension, vascular disease and obstruction. Now has ware in place. He is on norvasc and hydralazine for HT.     Ok for discharge.  Follow up in our office, Sherie Farrell NP, [facility], [phone], in 2-4 weeks.   Ware to stay in until F/U with Urology.            Interval History:   AAA repair  BPH  Dementia.                 Review of Systems:   Confused.          Medications:       amLODIPine (NORVASC) tablet 10 mg  10 mg Oral Daily     citalopram (celeXA) tablet 20 mg  20 mg Oral Daily     donepezil (ARICEPT) tablet 10 mg  10 mg Oral At Bedtime     finasteride  5 mg Oral Daily     hydrALAZINE  100 mg Oral 4x Daily     levETIRAcetam  250 mg Oral At Bedtime     levothyroxine (SYNTHROID/LEVOTHROID) tablet 125 mcg  125 mcg Oral QAM AC     memantine  10 mg Oral BID     QUEtiapine  25 mg Oral At Bedtime         Current active medications and PTA medications reviewed, see medication list for details.            Physical Exam:   Vitals were reviewed  Patient Vitals for the past 24 hrs:   BP Temp Temp src Pulse Heart Rate Resp SpO2   18 1205 144/69 98.1  F (36.7  C) Oral - - 16 95 %   18 0758 149/70 98  F (36.7  C) Oral - 69 16 95 %   18 0647 157/72 - - - 62 - -   05/10/18 2338 146/65 - - - - - -   05/10/18 2327 167/72 98.2  F (36.8  C) Oral 64 - 16 95 %   05/10/18 2100 143/68 96.7  F (35.9  C) Oral - 58 16 94 %   05/10/18 1644 162/65 95.9  F (35.5  C) Oral - 63 18 97 %       Temp:  [95.9  F (35.5  C)-98.2  F (36.8  C)] 98.1  F (36.7  C)  Pulse:  [64] 64  Heart Rate:  [58-69] 69  Resp:  [16-18] 16  BP: (143-167)/(65-72) 144/69  SpO2:  [94 %-97 %] 95 %    Temperatures:  Current - Temp: 98.1  F (36.7  C); Max - Temp  Av.4  F (36.3  C)  Min: 95.9  F (35.5  C)  Max: 98.2  F (36.8  C)  Respiration range: Resp  Av.4  Min: 16  Max: 18  Pulse range: Pulse  Av  Min: 64  Max: 64  Blood pressure range: Systolic (24hrs),  Av , Min:143 , Max:167   ; Diastolic (24hrs), Av, Min:65, Max:72    Pulse oximetry range: SpO2  Av.2 %  Min: 94 %  Max: 97 %    I/O last 3 completed shifts:  In: 600 [P.O.:600]  Out: 675 [Urine:675]      Intake/Output Summary (Last 24 hours) at 18 1451  Last data filed at 18 1330   Gross per 24 hour   Intake              600 ml   Output              425 ml   Net              175 ml       Alert, responsive  Lungs with clear BS  Cor RRR nl S1 S2 2/6 sys M  LE 1+ edema  ware       Wt Readings from Last 4 Encounters:   18 65.8 kg (145 lb)          Data:          Lab Results   Component Value Date     2018     05/10/2018     2018    Lab Results   Component Value Date    CHLORIDE 112 2018    CHLORIDE 112 05/10/2018    CHLORIDE 113 2018    Lab Results   Component Value Date    BUN 58 2018    BUN 50 05/10/2018    BUN 44 2018      Lab Results   Component Value Date    POTASSIUM 3.9 2018    POTASSIUM 4.3 05/10/2018    POTASSIUM 3.5 2018    Lab Results   Component Value Date    CO2 23 2018    CO2 24 05/10/2018    CO2 23 2018    Lab Results   Component Value Date    CR 3.59 2018    CR 3.55 05/10/2018    CR 3.34 2018        Recent Labs   Lab Test  18   0705  18   0627  18   1715   WBC   --   7.0  8.5   HGB  9.0*  9.0*  10.1*   HCT   --   26.7*  30.0*   MCV   --   88  88   PLT   --   109*  138*     Recent Labs   Lab Test  18   1715   AST  14   ALT  11   ALKPHOS  74   BILITOTAL  0.3       Recent Labs   Lab Test  18   0615   MAG  2.0     Recent Labs   Lab Test  18   0615   PHOS  3.0     Recent Labs   Lab Test  18   0615  05/10/18   0659  18   1312   TEMO  7.4*  7.6*  7.7*       Lab Results   Component Value Date    TEMO 7.4 (L) 2018     Lab Results   Component Value Date    WBC 7.0 2018    HGB 9.0 (L) 2018    HCT 26.7 (L) 2018    MCV 88  05/02/2018     (L) 05/02/2018     Lab Results   Component Value Date     05/11/2018    POTASSIUM 3.9 05/11/2018    CHLORIDE 112 (H) 05/11/2018    CO2 23 05/11/2018     (H) 05/11/2018     Lab Results   Component Value Date    BUN 58 (H) 05/11/2018    CR 3.59 (H) 05/11/2018     Lab Results   Component Value Date    MAG 2.0 05/11/2018     Lab Results   Component Value Date    PHOS 3.0 05/11/2018       Creatinine   Date Value Ref Range Status   05/11/2018 3.59 (H) 0.66 - 1.25 mg/dL Final   05/10/2018 3.55 (H) 0.66 - 1.25 mg/dL Final   05/09/2018 3.34 (H) 0.66 - 1.25 mg/dL Final   05/08/2018 3.27 (H) 0.66 - 1.25 mg/dL Final   05/07/2018 2.95 (H) 0.66 - 1.25 mg/dL Final   05/05/2018 2.93 (H) 0.66 - 1.25 mg/dL Final       Attestation:  I have reviewed today's vital signs, notes, medications, labs and imaging.     Kelvin Amaya MD

## 2018-05-11 NOTE — CONSULTS
Nephrology Consultation      Yuriy Ghosh MRN# [id number]   YOB: 1926 Age: 89 year old   Date of Admission: 5/1/2018     Reason for consult: I was asked by Dr. Knight to evaulate this patient for CKD. .           Assessment and Plan:   This is an elderly man with dementia and confusion who appears to have a progressive course of chronic kidney disease.  This is associated with diffuse vascular disease and hypertension.  He also has prostate enlargement and currently has a Rodriguez catheter in place.  He is on blood pressure medications with reasonable control and is on finasteride for his prostate.    There is no prior history of diabetes or urologic interventions.    I believe he most likely has chronic kidney disease based on vascular disease and hypertension.  He may have chronic obstruction as well that is now relieved with his Rodriguez catheter in place.  I would simply continue his current medications and he will need monitoring in the future once he leaves the hospital at our nephrology clinic.  We may want to get urology to see him to see if there is any intervention we need to do for the prostate.  We will follow him while he is in the hospital.             Chief Complaint:   Admitted with a fall. On 5/1/18.     History is obtained from the patient and electronic health record, pt is confused and has dementia.          History of Present Illness:   This patient is a 89 year old male who presents with a fall. He has a hx of hypertension and diffuse vascular disease including aortic aneurysm, S/P EVAR. He also has a hx of BPH. He has CKD but his baseline Cr in uncertain. On admission his creatinine was 3.38 and it improved over the next several days to 2.82.  However his creatinine has now been rising again and is now 3.59. His electrolytes are unremarkable with a potassium of 3.9 and a bicarbonate of 23.  His calcium is low at 7.4 and his magnesium and phosphorus are normal.  His uric acid is  normal. Urinalysis done yesterday showed 300 of protein moderate blood 21 white cells and 94 red cells.  This is from a Rodriguez catheter. The spot urine protein excretion was 7.68 g/g.  Imaging studies included a renal ultrasound which showed bilateral increased echogenicity without stone or mass.  There were numerous bilateral renal cysts but no hydronephrosis.  The right kidney was 11.9 cm and the left 11.4 cm.  The bladder was decompressed with a Rodriguez catheter. A CT scan of the abdomen without contrast showed no kidney stones.  There was no hydronephrosis.  The kidneys were normal in size and configuration.  There was a markedly enlarged prostate.  There are multiple bilateral renal cysts.    His creatinine on 11/17 was 2.0 with an estimated GFR of 38.  Reviewing past labs show that since 2009 his creatinine has been progressively getting worse.    His urine output for the last 3 days has been 800-1100 mL/day.    He was initially treated with IV fluids including saline and lactated Ringer's. I do not see any nephrotoxic exposure.        The patient is currently on amlodipine and hydralazine for his hypertension.  Currently his blood pressure shows a well-controlled diastolic pressure ranging from 65-77 and a mildly elevated systolic pressure ranging from 146-162.  His heart rate is in the 70 range.              Past Medical History:     Past Medical History:   Diagnosis Date     Alzheimer disease              Past Surgical History:   History reviewed. No pertinent surgical history.            Social History:     Social History   Substance Use Topics     Smoking status: Not on file     Smokeless tobacco: Not on file     Alcohol use No      Pt is  and lives with his wife. He does not smoke or drink. He is retired.        Family History:   No family history on file.          Allergies:   No Known Allergies          Medications:       amLODIPine (NORVASC) tablet 10 mg  10 mg Oral Daily     citalopram (celeXA)  tablet 20 mg  20 mg Oral Daily     donepezil (ARICEPT) tablet 10 mg  10 mg Oral At Bedtime     finasteride  5 mg Oral Daily     hydrALAZINE  100 mg Oral 4x Daily     levETIRAcetam  250 mg Oral At Bedtime     levothyroxine (SYNTHROID/LEVOTHROID) tablet 125 mcg  125 mcg Oral QAM AC     memantine  10 mg Oral BID     QUEtiapine  25 mg Oral At Bedtime     acetaminophen, acetaminophen, melatonin, naloxone, ondansetron **OR** ondansetron, opium-belladonna, polyethylene glycol, prochlorperazine **OR** prochlorperazine **OR** prochlorperazine, QUEtiapine          Review of Systems:   Pt confused.             Physical Exam:     Vitals were reviewed  Patient Vitals for the past 8 hrs:   BP Temp Temp src Heart Rate Resp SpO2   05/11/18 1205 144/69 98.1  F (36.7  C) Oral - 16 95 %   05/11/18 0758 149/70 98  F (36.7  C) Oral 69 16 95 %   05/11/18 0647 157/72 - - 62 - -     On physical exam the patient was alert but confused, sitting up in chair  Head showed no trauma, pupils round and reactive to light  Mouth shows good dentition, posterior pharynx is clear  Neck was supple  Lungs are clear bilateral breath sounds  Cardiac exam shows regular rhythm normal S1-S2 no murmur rub or gallop  Abdomen soft and nontender no bruit  Extremities show diminished pulses in the feet and no edema  Rodriguez catheter in place  Neurologic exam shows that he is confused but alert he is moving all 4 extremities is cranial nerves appear symmetric           Data:     Lab Results   Component Value Date    WBC 7.0 05/02/2018    WBC 8.5 05/01/2018    HGB 9.0 (L) 05/04/2018    HGB 9.0 (L) 05/02/2018    HGB 10.1 (L) 05/01/2018    HCT 26.7 (L) 05/02/2018    HCT 30.0 (L) 05/01/2018     (L) 05/02/2018     (L) 05/01/2018     05/11/2018     05/10/2018     05/09/2018    POTASSIUM 3.9 05/11/2018    POTASSIUM 4.3 05/10/2018    POTASSIUM 3.5 05/09/2018    CHLORIDE 112 (H) 05/11/2018    CHLORIDE 112 (H) 05/10/2018    CHLORIDE 113 (H)  05/09/2018    CO2 23 05/11/2018    CO2 24 05/10/2018    CO2 23 05/09/2018    BUN 58 (H) 05/11/2018    BUN 50 (H) 05/10/2018    BUN 44 (H) 05/09/2018    CR 3.59 (H) 05/11/2018    CR 3.55 (H) 05/10/2018    CR 3.34 (H) 05/09/2018     (H) 05/11/2018    GLC 93 05/10/2018     (H) 05/09/2018    TROPI 0.020 05/01/2018    AST 14 05/01/2018    ALT 11 05/01/2018    ALKPHOS 74 05/01/2018    BILITOTAL 0.3 05/01/2018    INR 1.15 (H) 05/01/2018

## 2018-05-12 ENCOUNTER — RESULTS ONLY (OUTPATIENT)
Dept: ENDOSCOPY | Facility: CLINIC | Age: 83
End: 2018-05-12

## 2018-05-12 LAB
ABO + RH BLD: NORMAL
ANION GAP SERPL CALCULATED.3IONS-SCNC: 10 MMOL/L (ref 3–14)
BLD GP AB SCN SERPL QL: NORMAL
BLD PROD TYP BPU: NORMAL
BLD UNIT ID BPU: 0
BLOOD BANK CMNT PATIENT-IMP: NORMAL
BLOOD PRODUCT CODE: NORMAL
BPU ID: NORMAL
BUN SERPL-MCNC: 76 MG/DL (ref 7–30)
CALCIUM SERPL-MCNC: 6.8 MG/DL (ref 8.5–10.1)
CHLORIDE SERPL-SCNC: 113 MMOL/L (ref 94–109)
CO2 SERPL-SCNC: 19 MMOL/L (ref 20–32)
CREAT SERPL-MCNC: 3.76 MG/DL (ref 0.66–1.25)
ERYTHROCYTE [DISTWIDTH] IN BLOOD BY AUTOMATED COUNT: 15.6 % (ref 10–15)
GFR SERPL CREATININE-BSD FRML MDRD: 15 ML/MIN/1.7M2
GLUCOSE SERPL-MCNC: 137 MG/DL (ref 70–99)
HCT VFR BLD AUTO: 16.8 % (ref 40–53)
HGB BLD-MCNC: 5.7 G/DL (ref 13.3–17.7)
HGB BLD-MCNC: 6 G/DL (ref 13.3–17.7)
HGB BLD-MCNC: NORMAL G/DL (ref 13.3–17.7)
INR PPP: 1.26 (ref 0.86–1.14)
MCH RBC QN AUTO: 29.5 PG (ref 26.5–33)
MCHC RBC AUTO-ENTMCNC: 33.9 G/DL (ref 31.5–36.5)
MCV RBC AUTO: 87 FL (ref 78–100)
NUM BPU REQUESTED: 5
PLATELET # BLD AUTO: 151 10E9/L (ref 150–450)
POTASSIUM SERPL-SCNC: 4.5 MMOL/L (ref 3.4–5.3)
RBC # BLD AUTO: 1.93 10E12/L (ref 4.4–5.9)
SODIUM SERPL-SCNC: 142 MMOL/L (ref 133–144)
SPECIMEN EXP DATE BLD: NORMAL
SPECIMEN EXP DATE BLD: NORMAL
TRANSFUSION STATUS PATIENT QL: NORMAL
UPPER GI ENDOSCOPY: NORMAL
WBC # BLD AUTO: 11.5 10E9/L (ref 4–11)

## 2018-05-12 PROCEDURE — 25000132 ZZH RX MED GY IP 250 OP 250 PS 637: Mod: GY | Performed by: HOSPITALIST

## 2018-05-12 PROCEDURE — 40000141 ZZH STATISTIC PERIPHERAL IV START W/O US GUIDANCE

## 2018-05-12 PROCEDURE — A9270 NON-COVERED ITEM OR SERVICE: HCPCS | Mod: GY | Performed by: INTERNAL MEDICINE

## 2018-05-12 PROCEDURE — 25000132 ZZH RX MED GY IP 250 OP 250 PS 637: Mod: GY | Performed by: INTERNAL MEDICINE

## 2018-05-12 PROCEDURE — 25000132 ZZH RX MED GY IP 250 OP 250 PS 637: Mod: GY

## 2018-05-12 PROCEDURE — 86850 RBC ANTIBODY SCREEN: CPT | Performed by: NURSE PRACTITIONER

## 2018-05-12 PROCEDURE — A9270 NON-COVERED ITEM OR SERVICE: HCPCS | Mod: GY | Performed by: HOSPITALIST

## 2018-05-12 PROCEDURE — 85018 HEMOGLOBIN: CPT | Performed by: NURSE PRACTITIONER

## 2018-05-12 PROCEDURE — 36415 COLL VENOUS BLD VENIPUNCTURE: CPT | Performed by: INTERNAL MEDICINE

## 2018-05-12 PROCEDURE — 80048 BASIC METABOLIC PNL TOTAL CA: CPT | Performed by: HOSPITALIST

## 2018-05-12 PROCEDURE — 25000125 ZZHC RX 250

## 2018-05-12 PROCEDURE — A9270 NON-COVERED ITEM OR SERVICE: HCPCS | Mod: GY

## 2018-05-12 PROCEDURE — 25000132 ZZH RX MED GY IP 250 OP 250 PS 637: Mod: GY | Performed by: PHYSICIAN ASSISTANT

## 2018-05-12 PROCEDURE — A9270 NON-COVERED ITEM OR SERVICE: HCPCS | Mod: GY | Performed by: UROLOGY

## 2018-05-12 PROCEDURE — 99231 SBSQ HOSP IP/OBS SF/LOW 25: CPT | Performed by: UROLOGY

## 2018-05-12 PROCEDURE — 86900 BLOOD TYPING SEROLOGIC ABO: CPT | Performed by: NURSE PRACTITIONER

## 2018-05-12 PROCEDURE — 86901 BLOOD TYPING SEROLOGIC RH(D): CPT | Performed by: NURSE PRACTITIONER

## 2018-05-12 PROCEDURE — 43255 EGD CONTROL BLEEDING ANY: CPT | Performed by: INTERNAL MEDICINE

## 2018-05-12 PROCEDURE — P9016 RBC LEUKOCYTES REDUCED: HCPCS | Performed by: NURSE PRACTITIONER

## 2018-05-12 PROCEDURE — 25000125 ZZHC RX 250: Performed by: UROLOGY

## 2018-05-12 PROCEDURE — 85018 HEMOGLOBIN: CPT | Performed by: INTERNAL MEDICINE

## 2018-05-12 PROCEDURE — 93010 ELECTROCARDIOGRAM REPORT: CPT | Performed by: INTERNAL MEDICINE

## 2018-05-12 PROCEDURE — 85027 COMPLETE CBC AUTOMATED: CPT | Performed by: NURSE PRACTITIONER

## 2018-05-12 PROCEDURE — 25000128 H RX IP 250 OP 636: Performed by: NURSE PRACTITIONER

## 2018-05-12 PROCEDURE — A9270 NON-COVERED ITEM OR SERVICE: HCPCS | Performed by: UROLOGY

## 2018-05-12 PROCEDURE — 80048 BASIC METABOLIC PNL TOTAL CA: CPT | Performed by: NURSE PRACTITIONER

## 2018-05-12 PROCEDURE — 93005 ELECTROCARDIOGRAM TRACING: CPT

## 2018-05-12 PROCEDURE — 86900 BLOOD TYPING SEROLOGIC ABO: CPT | Performed by: INTERNAL MEDICINE

## 2018-05-12 PROCEDURE — 25000132 ZZH RX MED GY IP 250 OP 250 PS 637: Mod: GY | Performed by: UROLOGY

## 2018-05-12 PROCEDURE — 99233 SBSQ HOSP IP/OBS HIGH 50: CPT | Performed by: INTERNAL MEDICINE

## 2018-05-12 PROCEDURE — 85610 PROTHROMBIN TIME: CPT | Performed by: NURSE PRACTITIONER

## 2018-05-12 PROCEDURE — 25000128 H RX IP 250 OP 636: Performed by: INTERNAL MEDICINE

## 2018-05-12 PROCEDURE — 36415 COLL VENOUS BLD VENIPUNCTURE: CPT | Performed by: NURSE PRACTITIONER

## 2018-05-12 PROCEDURE — A9270 NON-COVERED ITEM OR SERVICE: HCPCS | Mod: GY | Performed by: PHYSICIAN ASSISTANT

## 2018-05-12 PROCEDURE — 86923 COMPATIBILITY TEST ELECTRIC: CPT | Performed by: NURSE PRACTITIONER

## 2018-05-12 PROCEDURE — 25000125 ZZHC RX 250: Performed by: NURSE PRACTITIONER

## 2018-05-12 PROCEDURE — 12000000 ZZH R&B MED SURG/OB

## 2018-05-12 PROCEDURE — G0500 MOD SEDAT ENDO SERVICE >5YRS: HCPCS | Performed by: INTERNAL MEDICINE

## 2018-05-12 RX ORDER — FLUMAZENIL 0.1 MG/ML
0.2 INJECTION, SOLUTION INTRAVENOUS
Status: ACTIVE | OUTPATIENT
Start: 2018-05-12 | End: 2018-05-13

## 2018-05-12 RX ORDER — FENTANYL CITRATE 50 UG/ML
INJECTION, SOLUTION INTRAMUSCULAR; INTRAVENOUS PRN
Status: DISCONTINUED | OUTPATIENT
Start: 2018-05-12 | End: 2018-05-12 | Stop reason: HOSPADM

## 2018-05-12 RX ORDER — LIDOCAINE 40 MG/G
CREAM TOPICAL
Status: DISCONTINUED | OUTPATIENT
Start: 2018-05-12 | End: 2018-05-12 | Stop reason: HOSPADM

## 2018-05-12 RX ORDER — HYDRALAZINE HYDROCHLORIDE 50 MG/1
50 TABLET, FILM COATED ORAL EVERY 8 HOURS SCHEDULED
Status: DISCONTINUED | OUTPATIENT
Start: 2018-05-12 | End: 2018-05-15 | Stop reason: HOSPADM

## 2018-05-12 RX ORDER — NALOXONE HYDROCHLORIDE 0.4 MG/ML
.1-.4 INJECTION, SOLUTION INTRAMUSCULAR; INTRAVENOUS; SUBCUTANEOUS
Status: ACTIVE | OUTPATIENT
Start: 2018-05-12 | End: 2018-05-13

## 2018-05-12 RX ADMIN — FINASTERIDE 5 MG: 5 TABLET, FILM COATED ORAL at 10:38

## 2018-05-12 RX ADMIN — HYDRALAZINE HYDROCHLORIDE 50 MG: 50 TABLET ORAL at 21:24

## 2018-05-12 RX ADMIN — LEVETIRACETAM 250 MG: 250 TABLET, FILM COATED ORAL at 21:24

## 2018-05-12 RX ADMIN — MEMANTINE 10 MG: 10 TABLET ORAL at 10:41

## 2018-05-12 RX ADMIN — CITALOPRAM HYDROBROMIDE 20 MG: 20 TABLET ORAL at 10:39

## 2018-05-12 RX ADMIN — SODIUM CHLORIDE 8 MG/HR: 9 INJECTION, SOLUTION INTRAVENOUS at 08:38

## 2018-05-12 RX ADMIN — ATROPA BELLADONNA AND OPIUM 1 SUPPOSITORY: 16.2; 3 SUPPOSITORY RECTAL at 14:25

## 2018-05-12 RX ADMIN — LIDOCAINE HYDROCHLORIDE 10 ML: 20 JELLY TOPICAL at 09:18

## 2018-05-12 RX ADMIN — MEMANTINE 10 MG: 10 TABLET ORAL at 21:25

## 2018-05-12 RX ADMIN — SODIUM CHLORIDE 80 MG: 9 INJECTION, SOLUTION INTRAVENOUS at 08:18

## 2018-05-12 RX ADMIN — DONEPEZIL HYDROCHLORIDE 10 MG: 10 TABLET ORAL at 21:25

## 2018-05-12 RX ADMIN — QUETIAPINE FUMARATE 25 MG: 25 TABLET ORAL at 21:24

## 2018-05-12 RX ADMIN — LEVOTHYROXINE SODIUM 125 MCG: 125 TABLET ORAL at 10:40

## 2018-05-12 NOTE — CODE/RAPID RESPONSE
[facility]    RRT Note  5/12/2018   Time Called:0707    RRT called for: symptomatic anemia    Assessment & Plan   IMPRESSION & PLAN:    Presyncope, maroon stool in toilet, hemoglobin 5.7, normotensive, bradycardic in the 50s-60s, very pale    He denies shortness of breath, chest pain, heaviness, lightheadedness, dizziness, presyncope, abdominal pain    INTERVENTIONS:  CBC, type & screen, coags  Transfuse 1 RBC, can reevaluate posttransfusion in light of his AK I to monitor for any volume overload  Serial hgb q6h ×3  protonix bolus & infusion  Gi consult  POA/common law wife Eva updated by phone and blood consent obtained    Discussed with and defer further cares to rounding hospitalist Dr. Crooks  Interval History     Yuriy Ghosh is a 89 year old male who was admitted on 5/1/2018 for SARAHI and falls and bradycardia    Medical history significant for:   Past Medical History:   Diagnosis Date     Alzheimer disease    hypertension, peripheral arterial disease with prior abdominal aortic aneurysm endograft repair, seizure disorder, dyslipidemia, hypothyroidism, BPH and dementia      Code Status: Prior    Diana Faith    Allergies   No Known Allergies    Physical Exam   Vital Signs with Ranges:  Temp:  [96.4  F (35.8  C)-98.1  F (36.7  C)] 96.4  F (35.8  C)  Heart Rate:  [57-64] 57  Resp:  [16-20] 20  BP: (116-152)/(56-85) 133/65  SpO2:  [95 %-100 %] 100 %  I/O last 3 completed shifts:  In: 990 [P.O.:990]  Out: 825 [Urine:825]      Constitutional:no acute distress, very pale  ENT: pale conjunctiva  Neck: supple  Pulmonary: CTAB upper & lower lobes  Cardiovascular: S1S2, normotensive  GI: NABS/s/NT/ND, visual rectal exam showed 1 external hemorrhoid  Skin/Integumen: Pale and no mottling warm  Neuro: Follows commands to show 2 fingers wiggle feet bilaterally, speech is fluent  Psych: Oriented to person only disoriented to place time  Extremities: As above    Data     EKG:  Interpreted by Diana CAMARILLO  Marcell  Time reviewed:0850  Symptoms at time of EKG: None   Rhythm: 2 degree AV block - type I versus slow A. fib  Rate: 50-60  Axis: Normal  Ectopy: none  Conduction: 2nd degree AV block- type I  ST Segments/ T Waves: No ST-T wave changes  Q Waves: none  Comparison to prior: Unchanged from prior    Clinical Impression: bradyarrhythmia, unclear if afib, no prior hx     ABG:  -No lab results found in last 7 days.    Troponin:    Recent Labs   Lab Test  05/01/18   1715   TROPI  0.020       IMAGING: (X-ray/CT/MRI)   No results found for this or any previous visit (from the past 24 hour(s)).    CBC with Diff:  Recent Labs   Lab Test  05/12/18   0625   WBC  11.5*   HGB  5.7*  Canceled, Test credited   MCV  87   PLT  151   INR  1.26*        Comprehensive Metabolic Panel:    Recent Labs  Lab 05/12/18  0625 05/11/18  0615    142   POTASSIUM 4.5 3.9   CHLORIDE 113* 112*   CO2 19* 23   ANIONGAP 10 7   * 105*   BUN 76* 58*   CR 3.76* 3.59*   GFRESTIMATED 15* 16*   GFRESTBLACK 18* 19*   TEMO 6.8* 7.4*   MAG  --  2.0   PHOS  --  3.0       INR:    Recent Labs   Lab Test  05/12/18   0625   INR  1.26*       UA:    Recent Labs  Lab 05/10/18  1845   COLOR Yellow   APPEARANCE Slightly Cloudy   URINEGLC 70*   URINEBILI Negative   URINEKETONE Negative   SG 1.014   UBLD Moderate*   URINEPH 6.0   PROTEIN 300*   NITRITE Negative   LEUKEST Small*   RBCU 94*   WBCU 21*       Time Spent on this Encounter   I spent 3409-0545 (40) minutes on the unit/floor managing the care of Yuriy Ghosh. Over 50% of my time was spent counseling the patient and/or coordinating care regarding services listed in this note.

## 2018-05-12 NOTE — PLAN OF CARE
"Problem: Patient Care Overview  Goal: Plan of Care/Patient Progress Review  Outcome: Declining  7a-7p shift report  At approx 0600 pt reportedly had large maroon stool in toilet, hemoglobin 5.7, normotensive, bradycardic in the 50s-60s, very pale and c/o vertigo when up. No IV access. Writer enforced strict bedrest/bedpan until hgb/sx improve.  Denies n/v. Denied pain, abd soft non-tender. Hx hemorrhoidectomy approx 10 years ago. RRT called at 0700. Orders received.  EKG done showing a-fib CVR HR 60's. Pt was given Protonix bolUs at 0818, then protonix drip started at 0838. By 0850 protonix drip was stopped due to sudden pt c/o throat pain, and \"sensation\" of tongue swelling (not seen), and jaw swelling (not seen).  Writer marked Protonix as possible allergy.  1 unit pRBC transfusion was started at approx 0910 rate adjusted to 100cc/hr. Hgb check ordered q 6hr - next one to be after blood unit complete. Pt seen at bedside by gastroenterologist Dr. Alcantar and left floor at approx 1130 for EGD procedure. Writer updated endo staff and asked them to inform Dr Alcantar of possible allergy sx to this am's Protonix and his hemorrhoidal history. Morning BP meds held since BP soft.    Ware catheter became occluded at approx 0830 and several attempts to flush ware by charge nurse, Elba,  were unsuccessful. Uruologist Dr. Escalante notified. Orders received. Ware replaced by  Elba who reported very large blood clot found at tip of ware catheter when it was removed. New coude 20g/2 way ware was placed successfully by charge nurse with clear yellow returns.   Pt remains very unsteady, forgetful disorientated to situation.  PTrecommends TCU. Previous to this am pt usually required assist of 1/GB/walker/with tranfers   Disposition:PTA, pt was living with his \"common-law\" wife (i.e. significant other Velasquez who's lived with him for past 40 years). SW reports a current vulnerable adult order filed against pt's SO. Pt remains " "very weak, forgetful, and unable to care for self,  thus pt will be unable to return home. Plan is for dc to [facility] (if bed remains available) when medically stable for discharge     1330 Addendum :   Actively bleeding duodenal Ulcer found on EGD -treated by Dr. Alcantar  Writer concerned of scheduled BP meds and his persistent soft BP's and jessica due to present GI bleed. Notified Dr. Crooks via phone at 1330 regarding EGD results and vitals. He requested to hold all BP meds for remainder of day. But Ok to restart hydralazine if SBP>130    1400 ADDENDUM: Dr. Crooks doubts that pt sx after Protonix given were allergy related. States that :\"no swelling was seen by nurse or PA ... pt has Delirium and doesn't clearly identify/report his sx\". Thus \"the  Protonix will resume in am\". Writer deleted protonix from allergy list    1500 addendum: pt very anxious, with urinary urgency despite ware in place. Uine flowing out via penis around indwelling cath insertion site. Writer assumed possible clots and hand irrigated for 15 minutes without success. Irrigation fluid was able to flowing but not out. No clots seen. Writer medicated with B&O supp and called  Urologist - order obtained to remove ware. Pt was able to void since ware removed (incontinence in adult brief- amt thus not measurable) .  "

## 2018-05-12 NOTE — PROGRESS NOTES
[facility]    Hospitalist Progress Note    Assessment & Plan   Yuriy Ghosh is a 89 year old male with history including hypertension, 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    Bleeding duodenal ulcer  Acute on chronic blood loss anemia   Near syncope  On the morning of 5/12 the patient had a bowel movement with maroon colored stools and then had a near syncopal episode.  A RRT was called and the patient appeared pale and a CBC was obtained.  His hemoglobin had dropped significantly from admission and was down to 5.7.  Baseline appears to be 9-10 which was where he was on presentation.  Patient received 1 unit of PRBCs on 5/12.  GI was consulted and Dr. Alcantar took the patient for an EGD on 5/12 as well and found a bleeding duodenal ulcer  - GI following and appreciate their recommendations'  - Hgb checks q6hr with conditional units of blood ordered to transfuse if <7  - Protonix 40 mg IV daily starting tomorrow.  Of note, patient received 80 mg Protonix followed by Protonix drip and complained of some throat soreness/burning.  The house staff officer who saw the patient earlier during the RRT re-evaluated the patient and did not feel his tongue was swollen and the patient had no signs of anaphylaxis.  I do not suspect this was an allergic reaction as I found no prior history of receiving Protonix or other PPI making him susceptible to allergic reaction.  Also the patient had no throat tightening, swelling or hives       SARAHI on CKD  Previous Baseline 1.4? (stable in this range 2014 and 2016), Cr 2.0 in CareEverywhere 11/17.  Admission UA with > 182 RBC (possibly from traumatic cath in the ED) and >300 protein (also note UA 11/2017 with >300 protein and moderate blood).  CT stone  negative for CT or hydronephrosis, but noted very large prostate.  Renal US negative for hydronephrosis.  No significant improvement in function following ware placement.  Suspect CKD due to uncontrolled HTN.  Had only been taking Norvasc for BP PTA. No ACE/ARB or diuretic or BB as had been prescribed by PCP. Denies NSAIDs.  - Cr had transiently decreased to 2.9 while on IVF; trending up once IVF stopped  - Cr stable at 3.76  - Nephrology was following and appreciate their recommendations.  Plan to follow-up with patient as an outpatient      BPH   Ware placed 5/3.  Urology recommends continuing ware until seen in f/u with Dr. Pizarro 5/15/18.  PTA Cardura stopped due to his falls and he was placed on Finasteride 5 mg daily this stay.  - Continue ware and cardura     Essential hypertension  Patient was only receiving Norvasc 5 mg/d at home. Was suppose to be on BB, ACE and diuretic but had not been filling these.   - Continue Norvasc 10 mg daily  - Decreased Hydralazine down to 50 mg TID as blood pressures have been soft with the GI bleed  - Avoid BB due to bradycardia and Ace-I/ARB or diuretic due to CKD      Peripheral arterial disease  History of AAA endograft repair about 4 years ago.    - Continue aspirin      Sinus Bradycardia  EKG on admission with HR 40-50s with Lukasz, 1.  Note drop to 40's on 5/2 after beta-blocker was re-introduced, now discontinued.  Echo with EF 45-50%, mild global hypokinesia of LV, dilated LA, 1-2+ MR and TR, Pulm HTN, mod AV stenosis (no prior for comparison). Patient does not appear to be symptomatic but difficult determine with advanced dementia.  SO states patient had not been on Metoprolol PTA.   - No further beta-blockers, rates have been stable      Mechanical Fall    No seizure activity reported. Witnessed by his wife and was reported that he tripped over the steps outside of his home and no LOC reported. Unable to determine from patient if dizzy prior to fall or any other  sx's given dementia. No events other than bradycardia on tele.  TTE as above.  - Therapies recommend TCU      Hypothyroidism  - Continue levothyroxine 125 mcg daily      Seizure disorder  Keppra level <2 on admit.  Likely due to non-compliance as was placed on PTA dose with therapeutic level noted 5/10.    - Continue Keppra 250 mg QHS      Dementia  - Continue PTA memantine 10 mg BID, donepezil 10 mg QHS  - Seroquel 25 every evening and 12.5 mg Bid prn  - Celexa has been discontinued due to prolonged QTc      # Pain Assessment:  Current Pain Score 5/12/2018   Patient currently in pain? denies   Pain score (0-10) -   Yuriy knapp pain level was assessed and he currently denies pain.      DVT Prophylaxis: Pneumatic Compression Devices  Code Status: Prior    Disposition: Expected discharge in 2-3 days once GI bleed stable.  Still plan to go to memory care unit    Tutu Crooks, DO  Text Page (7am to 6pm)    Interval History   Patient seen and examined.  This AM a RRT was called as the patient had a near syncopal episode after having a bowel movement with maroon colored stool.  No chest pain, SOB or fevers.  No other issues.     -Data reviewed today: I reviewed all new labs and imaging results over the last 24 hours. I personally reviewed no images or EKG's today.    Physical Exam   Temp: 96.3  F (35.7  C) Temp src: Oral BP: 127/51 Pulse: 58 Heart Rate: 57 Resp: 14 SpO2: 100 % O2 Device: Nasal cannula Oxygen Delivery: 2 LPM  Vitals:    05/01/18 1928 05/03/18 0157 05/12/18 0230   Weight: 61.7 kg (136 lb) 65.8 kg (145 lb) 64.9 kg (143 lb 1.6 oz)     Vital Signs with Ranges  Temp:  [96.3  F (35.7  C)-98.1  F (36.7  C)] 96.3  F (35.7  C)  Pulse:  [58] 58  Heart Rate:  [57-64] 57  Resp:  [14-20] 14  BP: (116-152)/(51-85) 127/51  SpO2:  [95 %-100 %] 100 %  I/O last 3 completed shifts:  In: 990 [P.O.:990]  Out: 825 [Urine:825]    Constitutional: Awake, alert, cooperative, no apparent distress.  Pleasantly  demented  Respiratory: Clear to auscultation bilaterally, no crackles or wheezing  Cardiovascular: Bradycardiac.  Distant heart sounds so difficult to assess for murmur  GI: Normal bowel sounds, soft, non-distended, non-tender  Skin/Integumen: No rashes, no cyanosis.  No hives  MSK: No edema     Medications       amLODIPine (NORVASC) tablet 10 mg  10 mg Oral Daily     citalopram (celeXA) tablet 20 mg  20 mg Oral Daily     donepezil (ARICEPT) tablet 10 mg  10 mg Oral At Bedtime     finasteride  5 mg Oral Daily     hydrALAZINE  100 mg Oral 4x Daily     levETIRAcetam  250 mg Oral At Bedtime     levothyroxine (SYNTHROID/LEVOTHROID) tablet 125 mcg  125 mcg Oral QAM AC     memantine  10 mg Oral BID     QUEtiapine  25 mg Oral At Bedtime       Data     Recent Labs  Lab 05/12/18  0625 05/11/18  0615 05/10/18  0659   WBC 11.5*  --   --    HGB 5.7*  Canceled, Test credited  --   --    MCV 87  --   --      --   --    INR 1.26*  --   --     142 143   POTASSIUM 4.5 3.9 4.3   CHLORIDE 113* 112* 112*   CO2 19* 23 24   BUN 76* 58* 50*   CR 3.76* 3.59* 3.55*   ANIONGAP 10 7 7   TEMO 6.8* 7.4* 7.6*   * 105* 93       Imaging:   No results found for this or any previous visit (from the past 24 hour(s)). 6

## 2018-05-12 NOTE — CONSULTS
[facility]  Gastroenterology Consultation         Yuriy Ghosh  [address] [place] [address]  89 year old male    Admission Date/Time: 5/1/2018  Primary Care Provider: Kal Guadalupe  Referring / Attending Physician:  Dr. Crooks    We were asked to see the patient in consultation by Dr. Crooks for evaluation of acute GI bleed.      CC: Acute GI bleed and pre syncope    HPI:  Yuriy Ghosh is a 89 year old male who was admitted with h/O SARAHI and fall along with bradycardia. 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  Patient was being discharged today. Patient developed acute onset of dark maroon stools with hemoglobin drop from 9 to 5.7 with weakness and lightheadedness. Patient denied h/o chest pain, SOB. Patient was started on Protonix. Patient is getting I/V Protonix. Patient is getting transfused Patient is c/o abdominal pain.   Patient is laying comfortably    ROS: A comprehensive ten point review of systems was negative aside from those in mentioned in the HPI.      PAST MED HX:  I have reviewed this patient's medical history and updated it with pertinent information if needed.   Past Medical History:   Diagnosis Date     Alzheimer disease        MEDICATIONS:   Prior to Admission Medications   Prescriptions Last Dose Informant Patient Reported? Taking?   ASPIRIN PO Past Week at Unknown time Spouse/Significant Other Yes Yes   Sig: Take 81 mg by mouth daily   Ascorbic Acid (VITAMIN C PO) Past Week at Unknown time Spouse/Significant Other Yes No   Sig: Take 1,000 mg by mouth daily With rosehips   Biotin 5000 MCG CAPS Past Week at Unknown time Spouse/Significant Other Yes No   Sig: Take 5,000 mcg by mouth daily   CITALOPRAM HYDROBROMIDE PO Past Week at  Unknown time Spouse/Significant Other Yes No   Sig: Take 20 mg by mouth daily   CYANOCOBALAMIN PO Past Week at Unknown time Spouse/Significant Other Yes Yes   Sig: Take 2,500 mcg by mouth daily   DONEPEZIL HCL PO Past Week at Unknown time Spouse/Significant Other Yes Yes   Sig: Take 10 mg by mouth At Bedtime   DOXAZOSIN MESYLATE PO Past Week at Unknown time Spouse/Significant Other Yes No   Sig: Take 8 mg by mouth At Bedtime   FOLIC ACID PO Past Week at Unknown time Spouse/Significant Other Yes Yes   Sig: Take 400 mcg by mouth daily   GINKGO BILOBA MEMORY ENHANCER PO Past Week at Unknown time Spouse/Significant Other Yes No   Sig: Take 60 mg by mouth daily   Garlic 1000 MG CAPS Past Week at Unknown time Spouse/Significant Other Yes No   Sig: Take 1,000 mg by mouth daily   Glucosamine-Chondroitin 750-600 MG CHEW Past Week at Unknown time Spouse/Significant Other Yes No   Sig: Take 1 tablet by mouth 2 times daily   LEVOTHYROXINE SODIUM PO Past Week at Unknown time Spouse/Significant Other Yes Yes   Sig: Take 125 mcg by mouth daily   LISINOPRIL PO  Spouse/Significant Other Yes No   Sig: Take 20 mg by mouth daily   LevETIRAcetam (KEPPRA PO) Past Week at Unknown time Spouse/Significant Other Yes Yes   Sig: Take 250 mg by mouth At Bedtime   MEMANTINE HCL PO Past Week at Unknown time Spouse/Significant Other Yes Yes   Sig: Take 10 mg by mouth 2 times daily   METOPROLOL SUCCINATE ER PO  Spouse/Significant Other Yes No   Sig: Take 50 mg by mouth daily   Multiple Vitamins-Minerals (MULTIVITAMIN & MINERAL PO) Past Week at Unknown time Spouse/Significant Other Yes No   Sig: Take 1 tablet by mouth daily   PRAVASTATIN SODIUM PO  Spouse/Significant Other Yes No   Sig: Take 20 mg by mouth every evening   Turmeric 450 MG CAPS Past Week at Unknown time Spouse/Significant Other Yes No   Sig: Take 450 mg by mouth daily   VITAMIN D, CHOLECALCIFEROL, PO Past Week at Unknown time Spouse/Significant Other Yes Yes   Sig: Take 1,000 Units by  mouth daily   calcium citrate-vitamin D (CITRACAL) 315-250 MG-UNIT TABS per tablet Past Week at Unknown time Spouse/Significant Other Yes Yes   Sig: Take 1 tablet by mouth 2 times daily   fish oil-omega-3 fatty acids 1000 MG capsule Past Week at Unknown time Spouse/Significant Other Yes No   Sig: Take 1 g by mouth daily   olmesartan-hydrochlorothiazide (BENICAR) 20-12.5 MG per tablet  Spouse/Significant Other Yes No   Sig: Take 1 tablet by mouth daily      Facility-Administered Medications: None       ALLERGIES: No Known Allergies    SOCIAL HISTORY:  Social History   Substance Use Topics     Smoking status: Not on file     Smokeless tobacco: Not on file     Alcohol use No       FAMILY HISTORY:  No family history on file.    PHYSICAL EXAM:   General  Awake, comfortable alert.  Vital Signs with Ranges  Temp: 96.3  F (35.7  C) Temp src: Oral BP: 127/51 Pulse: 58 Heart Rate: 57 Resp: 14 SpO2: 100 % O2 Device: None (Room air) Oxygen Delivery: 2 LPM  I/O last 3 completed shifts:  In: 990 [P.O.:990]  Out: 825 [Urine:825]    Constitutional: healthy, alert and no distress   Cardiovascular: negative, PMI normal. No lifts, heaves, or thrills. RRR. No murmurs, clicks gallops or rub  Respiratory: negative, Percussion normal. Good diaphragmatic excursion. Lungs clear  Head: Normocephalic. No masses, lesions, tenderness or abnormalities  Neck: Neck supple. No adenopathy. Thyroid symmetric, normal size,, Carotids without bruits.  Abdomen: Abdomen soft, non-tender. BS normal. No masses, organomegaly  SKIN: no suspicious lesions or rashes          ADDITIONAL COMMENTS:   I reviewed the patient's new clinical lab test results.   Recent Labs   Lab Test  05/12/18 0625 05/04/18   0705  05/02/18   0627  05/01/18   1715   WBC  11.5*   --   7.0  8.5   HGB  5.7*  Canceled, Test credited  9.0*  9.0*  10.1*   MCV  87   --   88  88   PLT  151   --   109*  138*   INR  1.26*   --    --   1.15*     Recent Labs   Lab Test  05/12/18   0625   05/11/18   0615  05/10/18   0659   POTASSIUM  4.5  3.9  4.3   CHLORIDE  113*  112*  112*   CO2  19*  23  24   BUN  76*  58*  50*   ANIONGAP  10  7  7     Recent Labs   Lab Test  05/10/18   1845  05/02/18   0130  05/01/18   1715   ALBUMIN   --    --   2.6*   BILITOTAL   --    --   0.3   ALT   --    --   11   AST   --    --   14   PROTEIN  300*  300*   --        I reviewed the patient's new imaging results.        CONSULTATION ASSESSMENT AND PLAN:    Active Problems:    Fall    Assessment:  89 year old male with complicated past medical history with acute onset of abdominal discomfort and significant GI bleed. D/D would include acute upper GI bleed from DU. Other D/D would include diverticular bleed, ischemic colitis.  Will plan for Urgent EGD today. If negative than plan for colonoscopy.  Agree with current plan of I/V Protonix, serial Hbg, Transfusion of PRBc. Close monitoring.    Plan D/W patient and his girl friend.  Thank you letting me participate in his care.            Alex Alcantar MD, FACP  [facility].  Office: [phone]  Cell : [phone]

## 2018-05-12 NOTE — CONSULTS
Acute GI bleed with pre syncope  Plan for urgent EGD this AM  D/W patient and Dr. Valeriy Alcantar MD

## 2018-05-12 NOTE — PROVIDER NOTIFICATION
Hospitalist paged d/t patient's complaints of throat hurting, tongue swelling (not seen), and jaw swelling. Received orders to stop protonix gtt

## 2018-05-12 NOTE — PROVIDER NOTIFICATION
Notified hospitalist Dr. Crooks of the following:  Hgb drawn at 1435 was 6.0 (This was after pt returned to unit post EGD and treatment of active bleeding duodenal ulcer).   Order received to give 2 uniits pRBC this carrie, to hold the 6pm Hgb check,  and draw next Hgb 1 hr post completion of the 2nd unit of pRBC.   Pt remains very pale, lethargic, BP improving post procedure. Denies SOB.

## 2018-05-12 NOTE — PROGRESS NOTES
EGD: Actively bleeding duodenal Ulcer. Treated With BICAP and Epi.  Clear liquid diet    Alex shaw MD

## 2018-05-12 NOTE — PROGRESS NOTES
Urology    I was contacted by nurse this AM because patient's ware catheter became clogged, unable to irrigate  The nurse replaced ware with a new 20Fr ware. There was 1 blood clot in the old ware, otherwise urine has been clear and remains clear at this time    A/P: Ware replaced because of patient's upcoming EGD today and anemia  However, creatinine did not improve with ware placement and he was not in retention when I initially placed it, so I do not feel he needs a ware long term  D/C ware once current medical issues resolve, please call if future questions thanks

## 2018-05-13 LAB
ALBUMIN UR-MCNC: 100 MG/DL
ANION GAP SERPL CALCULATED.3IONS-SCNC: 8 MMOL/L (ref 3–14)
APPEARANCE UR: ABNORMAL
BACTERIA #/AREA URNS HPF: ABNORMAL /HPF
BILIRUB UR QL STRIP: NEGATIVE
BLD PROD TYP BPU: NORMAL
BLD PROD TYP BPU: NORMAL
BLD UNIT ID BPU: 0
BLD UNIT ID BPU: 0
BLOOD PRODUCT CODE: NORMAL
BLOOD PRODUCT CODE: NORMAL
BPU ID: NORMAL
BPU ID: NORMAL
BUN SERPL-MCNC: 85 MG/DL (ref 7–30)
CALCIUM SERPL-MCNC: 7 MG/DL (ref 8.5–10.1)
CHLORIDE SERPL-SCNC: 113 MMOL/L (ref 94–109)
CO2 SERPL-SCNC: 21 MMOL/L (ref 20–32)
COLOR UR AUTO: ABNORMAL
CREAT SERPL-MCNC: 4.22 MG/DL (ref 0.66–1.25)
CREAT UR-MCNC: 60 MG/DL
ERYTHROCYTE [DISTWIDTH] IN BLOOD BY AUTOMATED COUNT: 15.1 % (ref 10–15)
GFR SERPL CREATININE-BSD FRML MDRD: 13 ML/MIN/1.7M2
GLUCOSE SERPL-MCNC: 104 MG/DL (ref 70–99)
GLUCOSE UR STRIP-MCNC: 30 MG/DL
HCT VFR BLD AUTO: 19.8 % (ref 40–53)
HGB BLD-MCNC: 6.9 G/DL (ref 13.3–17.7)
HGB BLD-MCNC: 7.4 G/DL (ref 13.3–17.7)
HGB BLD-MCNC: 7.9 G/DL (ref 13.3–17.7)
HGB BLD-MCNC: 7.9 G/DL (ref 13.3–17.7)
HGB UR QL STRIP: ABNORMAL
KETONES UR STRIP-MCNC: NEGATIVE MG/DL
LEUKOCYTE ESTERASE UR QL STRIP: ABNORMAL
MCH RBC QN AUTO: 30.4 PG (ref 26.5–33)
MCHC RBC AUTO-ENTMCNC: 34.8 G/DL (ref 31.5–36.5)
MCV RBC AUTO: 87 FL (ref 78–100)
MUCOUS THREADS #/AREA URNS LPF: PRESENT /LPF
NITRATE UR QL: NEGATIVE
PH UR STRIP: 5.5 PH (ref 5–7)
PLATELET # BLD AUTO: 116 10E9/L (ref 150–450)
POTASSIUM SERPL-SCNC: 4.7 MMOL/L (ref 3.4–5.3)
PROT UR-MCNC: 2.31 G/L
PROT/CREAT 24H UR: 3.82 G/G CR (ref 0–0.2)
RBC # BLD AUTO: 2.27 10E12/L (ref 4.4–5.9)
RBC #/AREA URNS AUTO: 17 /HPF (ref 0–2)
SODIUM SERPL-SCNC: 142 MMOL/L (ref 133–144)
SOURCE: ABNORMAL
SP GR UR STRIP: 1.01 (ref 1–1.03)
SQUAMOUS #/AREA URNS AUTO: 1 /HPF (ref 0–1)
TRANSFUSION STATUS PATIENT QL: NORMAL
UROBILINOGEN UR STRIP-MCNC: NORMAL MG/DL (ref 0–2)
WBC # BLD AUTO: 12 10E9/L (ref 4–11)
WBC #/AREA URNS AUTO: 41 /HPF (ref 0–5)

## 2018-05-13 PROCEDURE — A9270 NON-COVERED ITEM OR SERVICE: HCPCS | Mod: GY | Performed by: HOSPITALIST

## 2018-05-13 PROCEDURE — 81001 URINALYSIS AUTO W/SCOPE: CPT | Performed by: INTERNAL MEDICINE

## 2018-05-13 PROCEDURE — 87086 URINE CULTURE/COLONY COUNT: CPT | Performed by: INTERNAL MEDICINE

## 2018-05-13 PROCEDURE — 25000128 H RX IP 250 OP 636: Performed by: INTERNAL MEDICINE

## 2018-05-13 PROCEDURE — A9270 NON-COVERED ITEM OR SERVICE: HCPCS | Mod: GY | Performed by: PHYSICIAN ASSISTANT

## 2018-05-13 PROCEDURE — 25000132 ZZH RX MED GY IP 250 OP 250 PS 637: Mod: GY | Performed by: INTERNAL MEDICINE

## 2018-05-13 PROCEDURE — 99231 SBSQ HOSP IP/OBS SF/LOW 25: CPT | Performed by: UROLOGY

## 2018-05-13 PROCEDURE — 36415 COLL VENOUS BLD VENIPUNCTURE: CPT | Performed by: INTERNAL MEDICINE

## 2018-05-13 PROCEDURE — 85018 HEMOGLOBIN: CPT | Performed by: INTERNAL MEDICINE

## 2018-05-13 PROCEDURE — 85018 HEMOGLOBIN: CPT | Performed by: NURSE PRACTITIONER

## 2018-05-13 PROCEDURE — 25000125 ZZHC RX 250: Performed by: INTERNAL MEDICINE

## 2018-05-13 PROCEDURE — P9016 RBC LEUKOCYTES REDUCED: HCPCS | Performed by: NURSE PRACTITIONER

## 2018-05-13 PROCEDURE — 12000000 ZZH R&B MED SURG/OB

## 2018-05-13 PROCEDURE — 25000132 ZZH RX MED GY IP 250 OP 250 PS 637: Mod: GY | Performed by: UROLOGY

## 2018-05-13 PROCEDURE — A9270 NON-COVERED ITEM OR SERVICE: HCPCS | Mod: GY

## 2018-05-13 PROCEDURE — A9270 NON-COVERED ITEM OR SERVICE: HCPCS | Mod: GY | Performed by: INTERNAL MEDICINE

## 2018-05-13 PROCEDURE — 85027 COMPLETE CBC AUTOMATED: CPT | Performed by: INTERNAL MEDICINE

## 2018-05-13 PROCEDURE — 25000132 ZZH RX MED GY IP 250 OP 250 PS 637: Mod: GY

## 2018-05-13 PROCEDURE — 25000132 ZZH RX MED GY IP 250 OP 250 PS 637: Mod: GY | Performed by: HOSPITALIST

## 2018-05-13 PROCEDURE — 36415 COLL VENOUS BLD VENIPUNCTURE: CPT | Performed by: NURSE PRACTITIONER

## 2018-05-13 PROCEDURE — 25000132 ZZH RX MED GY IP 250 OP 250 PS 637: Mod: GY | Performed by: PHYSICIAN ASSISTANT

## 2018-05-13 PROCEDURE — 99233 SBSQ HOSP IP/OBS HIGH 50: CPT | Performed by: INTERNAL MEDICINE

## 2018-05-13 PROCEDURE — 80048 BASIC METABOLIC PNL TOTAL CA: CPT | Performed by: INTERNAL MEDICINE

## 2018-05-13 PROCEDURE — A9270 NON-COVERED ITEM OR SERVICE: HCPCS | Mod: GY | Performed by: UROLOGY

## 2018-05-13 RX ORDER — SODIUM CHLORIDE 9 MG/ML
INJECTION, SOLUTION INTRAVENOUS CONTINUOUS
Status: ACTIVE | OUTPATIENT
Start: 2018-05-13 | End: 2018-05-14

## 2018-05-13 RX ADMIN — AMLODIPINE BESYLATE 10 MG: 10 TABLET ORAL at 08:40

## 2018-05-13 RX ADMIN — PANTOPRAZOLE SODIUM 40 MG: 40 INJECTION, POWDER, FOR SOLUTION INTRAVENOUS at 08:40

## 2018-05-13 RX ADMIN — DONEPEZIL HYDROCHLORIDE 10 MG: 10 TABLET ORAL at 21:02

## 2018-05-13 RX ADMIN — MEMANTINE 10 MG: 10 TABLET ORAL at 21:02

## 2018-05-13 RX ADMIN — MEMANTINE 10 MG: 10 TABLET ORAL at 08:41

## 2018-05-13 RX ADMIN — QUETIAPINE FUMARATE 25 MG: 25 TABLET ORAL at 21:02

## 2018-05-13 RX ADMIN — FINASTERIDE 5 MG: 5 TABLET, FILM COATED ORAL at 08:40

## 2018-05-13 RX ADMIN — LEVETIRACETAM 250 MG: 250 TABLET, FILM COATED ORAL at 21:02

## 2018-05-13 RX ADMIN — HYDRALAZINE HYDROCHLORIDE 50 MG: 50 TABLET ORAL at 21:02

## 2018-05-13 RX ADMIN — HYDRALAZINE HYDROCHLORIDE 50 MG: 50 TABLET ORAL at 13:35

## 2018-05-13 RX ADMIN — CITALOPRAM HYDROBROMIDE 20 MG: 20 TABLET ORAL at 08:41

## 2018-05-13 RX ADMIN — HYDRALAZINE HYDROCHLORIDE 50 MG: 50 TABLET ORAL at 06:03

## 2018-05-13 RX ADMIN — SODIUM CHLORIDE: 9 INJECTION, SOLUTION INTRAVENOUS at 15:09

## 2018-05-13 RX ADMIN — LEVOTHYROXINE SODIUM 125 MCG: 125 TABLET ORAL at 06:03

## 2018-05-13 NOTE — PROGRESS NOTES
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     Bleeding duodenal ulcer  Acute on chronic blood loss anemia   Near syncope  On the morning of 5/12 the patient had a bowel movement with maroon colored stools and then had a near syncopal episode.  A RRT was called and the patient appeared pale and a CBC was obtained.  His hemoglobin had dropped significantly from admission and was down to 5.7.  Baseline appears to be 9-10 which was where he was on presentation.  Patient received 1 unit of PRBCs on 5/12.  GI was consulted and Dr. Alcantar took the patient for an EGD on 5/12 as well and found a bleeding duodenal ulcer.  On rechecks patient has required an additional 3 units of blood for a total of 4 units now   - GI following and appreciate their recommendations  - Will continue Hgb checks q6hr with conditional units of blood ordered to transfuse if <7  - Protonix 40 mg IV daily        SARAHI on CKD  Previous Baseline 1.4? (stable in this range 2014 and 2016), Cr 2.0 in CareEverywhere 11/17.  Admission UA with > 182 RBC (possibly from traumatic cath in the ED) and >300 protein (also note UA 11/2017 with >300 protein and moderate blood).  CT stone negative for CT or hydronephrosis, but noted very large prostate.  Renal US negative for hydronephrosis.  No significant improvement in function following ware placement.  Suspect CKD due to uncontrolled HTN.  Had only been taking Norvasc for BP PTA. No ACE/ARB or diuretic or BB as had been prescribed by PCP. Denies NSAIDs.  - Cr had transiently decreased to 2.9 while on IVF.  On 5/11 patient began to have steadily worsening  renal function and was up to 4.22 on 5/13  - Nephrology re-consulted given the worsening Cr and appreciate their recommendations       BPH   Ware placed 5/3.  Urology recommended continuing ware until seen in f/u with Dr. Pizarro 5/15/18.  PTA Cardura stopped due to his falls and he was placed on Finasteride 5 mg daily this stay.  - Continue Cardura      Essential hypertension  Patient was only receiving Norvasc 5 mg/d at home. Was suppose to be on BB, ACE and diuretic but had not been filling these.   - Continue Norvasc 10 mg daily  - Decreased Hydralazine down to 50 mg TID as blood pressures have been soft with the GI bleed  - Avoid BB due to bradycardia and Ace-I/ARB or diuretic due to CKD      Peripheral arterial disease  History of AAA endograft repair about 4 years ago.    - Holding aspirin due to above     Sinus Bradycardia  EKG on admission with HR 40-50s with Lukasz, 1.  Note drop to 40's on 5/2 after beta-blocker was re-introduced, now discontinued.  Echo with EF 45-50%, mild global hypokinesia of LV, dilated LA, 1-2+ MR and TR, Pulm HTN, mod AV stenosis (no prior for comparison). Patient does not appear to be symptomatic but difficult determine with advanced dementia.  SO states patient had not been on Metoprolol PTA.   - No further beta-blockers, rates have been stable      Mechanical Fall    No seizure activity reported. Witnessed by his wife and was reported that he tripped over the steps outside of his home and no LOC reported. Unable to determine from patient if dizzy prior to fall or any other sx's given dementia. No events other than bradycardia on tele.  TTE as above.  - Therapies recommend TCU      Hypothyroidism  - Continue levothyroxine 125 mcg daily      Seizure disorder  Keppra level <2 on admit.  Likely due to non-compliance as was placed on PTA dose with therapeutic level noted 5/10.    - Continue Keppra 250 mg QHS      Dementia  - Continue PTA memantine 10 mg BID, donepezil 10 mg QHS  -  Seroquel 25 every evening and 12.5 mg Bid prn  - Celexa has been discontinued due to prolonged QTc    # Pain Assessment:  Current Pain Score 5/13/2018   Patient currently in pain? denies   Pain score (0-10) -   Yuriy knapp pain level was assessed and he currently denies pain.        DVT Prophylaxis: Pneumatic Compression Devices  Code Status: Prior    Disposition: Expected discharge in 1-2 days once hemoglobins stable and Cr improving.  Will still plan on TCU as previously planned    Tutu Crooks,   Text Page (7am to 6pm)    Interval History   Patient seen and examined.  No pain currently.  No difficulty breathing.  No further bloody stools per nursing.      -Data reviewed today: I reviewed all new labs and imaging results over the last 24 hours. I personally reviewed no images or EKG's today.    Physical Exam   Temp: 98.6  F (37  C) Temp src: Oral BP: 129/50 Pulse: 50 Heart Rate: 70 Resp: 16 SpO2: 95 % O2 Device: None (Room air)    Vitals:    05/01/18 1928 05/03/18 0157 05/12/18 0230   Weight: 61.7 kg (136 lb) 65.8 kg (145 lb) 64.9 kg (143 lb 1.6 oz)     Vital Signs with Ranges  Temp:  [96.8  F (36  C)-99.3  F (37.4  C)] 98.6  F (37  C)  Pulse:  [50-54] 50  Heart Rate:  [51-71] 70  Resp:  [12-21] 16  BP: ()/(46-84) 129/50  SpO2:  [95 %-100 %] 95 %  I/O last 3 completed shifts:  In: 775 [P.O.:420]  Out: 350 [Urine:250; Stool:100]    Constitutional: Pale.  Awake, alert, cooperative  Respiratory: Clear to auscultation bilaterally, no crackles or wheezing  Cardiovascular: Regular rate and rhythm, normal S1 and S2, and systolic murmur noted  GI: Normal bowel sounds, soft, non-distended, non-tender  Skin/Integumen: No rashes, no cyanosis  MSK: No edema     Medications     - MEDICATION INSTRUCTIONS -         amLODIPine (NORVASC) tablet 10 mg  10 mg Oral Daily     citalopram (celeXA) tablet 20 mg  20 mg Oral Daily     donepezil (ARICEPT) tablet 10 mg  10 mg Oral At Bedtime     finasteride  5 mg Oral Daily      hydrALAZINE  50 mg Oral Q8H RAMIRO     levETIRAcetam  250 mg Oral At Bedtime     levothyroxine (SYNTHROID/LEVOTHROID) tablet 125 mcg  125 mcg Oral QAM AC     memantine  10 mg Oral BID     pantoprazole (PROTONIX) IV  40 mg Intravenous Daily with breakfast     QUEtiapine  25 mg Oral At Bedtime       Data     Recent Labs  Lab 05/13/18  0709 05/13/18  0001 05/12/18  1435 05/12/18  0625  05/11/18  0615   WBC 12.0*  --   --  11.5*  --   --    HGB 6.9* 7.4* 6.0* 5.7*  Canceled, Test credited  < >  --    MCV 87  --   --  87  --   --    *  --   --  151  --   --    INR  --   --   --  1.26*  --   --      --   --  142  --  142   POTASSIUM 4.7  --   --  4.5  --  3.9   CHLORIDE 113*  --   --  113*  --  112*   CO2 21  --   --  19*  --  23   BUN 85*  --   --  76*  --  58*   CR 4.22*  --   --  3.76*  --  3.59*   ANIONGAP 8  --   --  10  --  7   TEMO 7.0*  --   --  6.8*  --  7.4*   *  --   --  137*  --  105*   < > = values in this interval not displayed.    Imaging:   No results found for this or any previous visit (from the past 24 hour(s)).

## 2018-05-13 NOTE — PLAN OF CARE
Problem: Patient Care Overview  Goal: Plan of Care/Patient Progress Review  Outcome: No Change  Alert to self only, impulsive at times, assist of 2, tolerating clear liquids, one melena stool last evening, no BM over night, received total of three units of blood yesterday, [place] Hgb 7.4, recheck this am, EGD yesterday, One spurting duodenal ulcer with a visible vessel was found in the first portion of the duodenum. The lesion was 10 mm in largest dimension. Area was unsuccessfully injected with 1 mL of a 1:10,000 solution of epinephrine for hemostasis. Coagulation for hemostasis using bipolar probe was successful, ware for retention however ware became clogged and was replaced, pt very uncomfortable and urine leaking around the insertion site, urology contacted and ordered to remove ware, pt incont urine, urine clear yellow, bladder scan unremarkable, creat 3.76 yesterday with recheck this am

## 2018-05-13 NOTE — PROGRESS NOTES
Urology    Catheter was removed yesterday and he has been voiding normally  He has urinary incontinence, which is his baseline    A/P: plan is to leave the catheter out  Please call me if any further questions during this hospitalization, thanks

## 2018-05-13 NOTE — PROVIDER NOTIFICATION
Hospitalist paged with critical low Hgb 6.9, rise in Cr to 4.22    Addendum: Orders received to transfuse 1 unit blood, recheck Hgb 1 hour post transfusion, then follow conditional transfusion orders. Will contact nephrology re Cr increase.

## 2018-05-13 NOTE — PROGRESS NOTES
Assessment and Plan:   SARAHI: CKD. Asked to see pt again due to rising Cr. Felt to have CKD due to hypertension, vascular disease and prostatic obstruction. Rodriguez is out and he has been seen by Urology.   Labs show K 4.7, HCO3 21, Cr up to 4.22. It has steadily increased over the last week.   UprV 7.68 gm/gm, . Imaging does not show obstruction, and is consistent with BPH and CKD.   His wt is up 3.2 kg since adm. I/O inaccurate.   No obvious nephrotoxic exposure.     Will repeat U/A and UprV. Trial of IVF. Follow labs.             Interval History:   AAA repair  Hypertension: norvasc, hydralazine  BPH: on finasteride.   Dementia       Low Hgb: getting 1 U PRBC today. On IV protonix.  Found to have bleeding DU on EGD yesterday.  Hgb today 6.9.             Review of Systems:   No SOB or CP. Taking po well. Confused.           Medications:       amLODIPine (NORVASC) tablet 10 mg  10 mg Oral Daily     citalopram (celeXA) tablet 20 mg  20 mg Oral Daily     donepezil (ARICEPT) tablet 10 mg  10 mg Oral At Bedtime     finasteride  5 mg Oral Daily     hydrALAZINE  50 mg Oral Q8H RAMIRO     levETIRAcetam  250 mg Oral At Bedtime     levothyroxine (SYNTHROID/LEVOTHROID) tablet 125 mcg  125 mcg Oral QAM AC     memantine  10 mg Oral BID     pantoprazole (PROTONIX) IV  40 mg Intravenous Daily with breakfast     QUEtiapine  25 mg Oral At Bedtime       - MEDICATION INSTRUCTIONS -       Current active medications and PTA medications reviewed, see medication list for details.            Physical Exam:   Vitals were reviewed  Patient Vitals for the past 24 hrs:   BP Temp Temp src Pulse Heart Rate Resp SpO2   05/13/18 1210 142/63 96.9  F (36.1  C) Oral 56 110 16 96 %   05/13/18 1041 126/49 - - 55 99 14 96 %   05/13/18 0941 129/50 98.6  F (37  C) Oral 50 - 16 95 %   05/13/18 0918 130/55 98.2  F (36.8  C) Oral 51 - 16 95 %   05/13/18 0806 133/53 98.1  F (36.7  C) Oral 54 70 16 96 %   05/13/18 0600 142/68 - - - 62 - -   05/13/18  0019 130/60 98.9  F (37.2  C) Oral - 58 16 97 %   18 139/62 99.3  F (37.4  C) Oral - 54 - 97 %   18 149/67 99  F (37.2  C) Oral - 56 16 96 %   18 139/53 98.9  F (37.2  C) Oral - 67 16 97 %   18 140/54 98  F (36.7  C) Oral - 57 16 97 %   18 191 153/73 98.5  F (36.9  C) Axillary - 58 16 97 %   18 1736 133/67 97.1  F (36.2  C) Axillary - 59 17 98 %   18 1627 144/57 98.8  F (37.1  C) Axillary - - 17 98 %   18 1555 134/54 98.8  F (37.1  C) Axillary - 60 15 97 %   18 1321 113/49 96.8  F (36  C) Axillary - 51 16 96 %       Temp:  [96.8  F (36  C)-99.3  F (37.4  C)] 96.9  F (36.1  C)  Pulse:  [50-56] 56  Heart Rate:  [] 110  Resp:  [14-17] 16  BP: (113-153)/(49-73) 142/63  SpO2:  [95 %-98 %] 96 %    Temperatures:  Current - Temp: 96.9  F (36.1  C); Max - Temp  Av.3  F (36.8  C)  Min: 96.8  F (36  C)  Max: 99.3  F (37.4  C)  Respiration range: Resp  Avg: 15.9  Min: 14  Max: 17  Pulse range: Pulse  Av.2  Min: 50  Max: 56  Blood pressure range: Systolic (24hrs), Av , Min:113 , Max:153   ; Diastolic (24hrs), Av, Min:49, Max:73    Pulse oximetry range: SpO2  Av.5 %  Min: 95 %  Max: 98 %    I/O last 3 completed shifts:  In: 775 [P.O.:420]  Out: 350 [Urine:250; Stool:100]      Intake/Output Summary (Last 24 hours) at 18 1304  Last data filed at 18 0807   Gross per 24 hour   Intake             1040 ml   Output              250 ml   Net              790 ml       Alert, responsive  Lungs with clear BS  Cor RRR nl S1 S2 2/6 sys M  LE 1-2+ edema       Wt Readings from Last 4 Encounters:   18 64.9 kg (143 lb 1.6 oz)          Data:          Lab Results   Component Value Date     2018     2018     2018    Lab Results   Component Value Date    CHLORIDE 113 2018    CHLORIDE 113 2018    CHLORIDE 112 2018    Lab Results   Component Value Date    BUN 85 2018     BUN 76 05/12/2018    BUN 58 05/11/2018      Lab Results   Component Value Date    POTASSIUM 4.7 05/13/2018    POTASSIUM 4.5 05/12/2018    POTASSIUM 3.9 05/11/2018    Lab Results   Component Value Date    CO2 21 05/13/2018    CO2 19 05/12/2018    CO2 23 05/11/2018    Lab Results   Component Value Date    CR 4.22 05/13/2018    CR 3.76 05/12/2018    CR 3.59 05/11/2018        Recent Labs   Lab Test  05/13/18   0709  05/13/18   0001  05/12/18   1435  05/12/18   0625   05/02/18   0627   WBC  12.0*   --    --   11.5*   --   7.0   HGB  6.9*  7.4*  6.0*  5.7*  Canceled, Test credited   < >  9.0*   HCT  19.8*   --    --   16.8*   --   26.7*   MCV  87   --    --   87   --   88   PLT  116*   --    --   151   --   109*    < > = values in this interval not displayed.     Recent Labs   Lab Test  05/01/18   1715   AST  14   ALT  11   ALKPHOS  74   BILITOTAL  0.3       Recent Labs   Lab Test  05/11/18   0615   MAG  2.0     Recent Labs   Lab Test  05/11/18 0615   PHOS  3.0     Recent Labs   Lab Test  05/13/18   0709  05/12/18   0625  05/11/18   0615   TEMO  7.0*  6.8*  7.4*       Lab Results   Component Value Date    TEMO 7.0 (L) 05/13/2018     Lab Results   Component Value Date    WBC 12.0 (H) 05/13/2018    HGB 6.9 (LL) 05/13/2018    HCT 19.8 (L) 05/13/2018    MCV 87 05/13/2018     (L) 05/13/2018     Lab Results   Component Value Date     05/13/2018    POTASSIUM 4.7 05/13/2018    CHLORIDE 113 (H) 05/13/2018    CO2 21 05/13/2018     (H) 05/13/2018     Lab Results   Component Value Date    BUN 85 (H) 05/13/2018    CR 4.22 (H) 05/13/2018     Lab Results   Component Value Date    MAG 2.0 05/11/2018     Lab Results   Component Value Date    PHOS 3.0 05/11/2018       Creatinine   Date Value Ref Range Status   05/13/2018 4.22 (H) 0.66 - 1.25 mg/dL Final   05/12/2018 3.76 (H) 0.66 - 1.25 mg/dL Final   05/11/2018 3.59 (H) 0.66 - 1.25 mg/dL Final   05/10/2018 3.55 (H) 0.66 - 1.25 mg/dL Final   05/09/2018 3.34 (H) 0.66 -  1.25 mg/dL Final   05/08/2018 3.27 (H) 0.66 - 1.25 mg/dL Final       Attestation:  I have reviewed today's vital signs, notes, medications, labs and imaging.     Kelvin Amaya MD

## 2018-05-13 NOTE — PROGRESS NOTES
Clinically stable.   Hbg stable. Agree with transfusing 1 unit PRBC today.  Advance diet.  Continue on protonix I/V    Alex Alcantar MD  [phone]

## 2018-05-13 NOTE — PROVIDER NOTIFICATION
Hospitalist paged to see UA result  Response: Add-on reflex to culture, do not treat at this time.

## 2018-05-13 NOTE — PLAN OF CARE
Problem: Patient Care Overview  Goal: Plan of Care/Patient Progress Review  PT: Noting pt with RRT yesterday d/t acute symptomatic anemia secondary to actively bleeding duodenal ulcer with hgb down to 5.7 on 5/12, s/p urgent EGD 5/12. Most recent draw 6.9, pt just finished receiving 1 unit PRBC per discussion with RN who is in agreement for trial of PT. Two attempts to see pt for PT this PM. Pt adamantly refusing any activity, becoming somewhat agitated clenching fists and teeth. Discussed with RN.

## 2018-05-14 LAB
ANION GAP SERPL CALCULATED.3IONS-SCNC: 8 MMOL/L (ref 3–14)
BACTERIA SPEC CULT: NORMAL
BUN SERPL-MCNC: 74 MG/DL (ref 7–30)
CALCIUM SERPL-MCNC: 7 MG/DL (ref 8.5–10.1)
CHLORIDE SERPL-SCNC: 114 MMOL/L (ref 94–109)
CO2 SERPL-SCNC: 20 MMOL/L (ref 20–32)
CREAT SERPL-MCNC: 3.9 MG/DL (ref 0.66–1.25)
GFR SERPL CREATININE-BSD FRML MDRD: 15 ML/MIN/1.7M2
GLUCOSE SERPL-MCNC: 88 MG/DL (ref 70–99)
HGB BLD-MCNC: 7.5 G/DL (ref 13.3–17.7)
HGB BLD-MCNC: 7.5 G/DL (ref 13.3–17.7)
Lab: NORMAL
MAGNESIUM SERPL-MCNC: 2 MG/DL (ref 1.6–2.3)
PHOSPHATE SERPL-MCNC: 3.6 MG/DL (ref 2.5–4.5)
POTASSIUM SERPL-SCNC: 4.2 MMOL/L (ref 3.4–5.3)
SODIUM SERPL-SCNC: 142 MMOL/L (ref 133–144)
SPECIMEN SOURCE: NORMAL

## 2018-05-14 PROCEDURE — 25000132 ZZH RX MED GY IP 250 OP 250 PS 637: Mod: GY | Performed by: INTERNAL MEDICINE

## 2018-05-14 PROCEDURE — A9270 NON-COVERED ITEM OR SERVICE: HCPCS | Mod: GY | Performed by: INTERNAL MEDICINE

## 2018-05-14 PROCEDURE — 36415 COLL VENOUS BLD VENIPUNCTURE: CPT | Performed by: INTERNAL MEDICINE

## 2018-05-14 PROCEDURE — 84100 ASSAY OF PHOSPHORUS: CPT | Performed by: INTERNAL MEDICINE

## 2018-05-14 PROCEDURE — 25000125 ZZHC RX 250: Performed by: INTERNAL MEDICINE

## 2018-05-14 PROCEDURE — 99232 SBSQ HOSP IP/OBS MODERATE 35: CPT | Performed by: INTERNAL MEDICINE

## 2018-05-14 PROCEDURE — 25000132 ZZH RX MED GY IP 250 OP 250 PS 637: Mod: GY

## 2018-05-14 PROCEDURE — 83735 ASSAY OF MAGNESIUM: CPT | Performed by: INTERNAL MEDICINE

## 2018-05-14 PROCEDURE — A9270 NON-COVERED ITEM OR SERVICE: HCPCS | Mod: GY | Performed by: HOSPITALIST

## 2018-05-14 PROCEDURE — A9270 NON-COVERED ITEM OR SERVICE: HCPCS | Mod: GY | Performed by: UROLOGY

## 2018-05-14 PROCEDURE — A9270 NON-COVERED ITEM OR SERVICE: HCPCS | Mod: GY | Performed by: PHYSICIAN ASSISTANT

## 2018-05-14 PROCEDURE — 25000132 ZZH RX MED GY IP 250 OP 250 PS 637: Mod: GY | Performed by: UROLOGY

## 2018-05-14 PROCEDURE — 85018 HEMOGLOBIN: CPT | Performed by: INTERNAL MEDICINE

## 2018-05-14 PROCEDURE — 25000132 ZZH RX MED GY IP 250 OP 250 PS 637: Mod: GY | Performed by: PHYSICIAN ASSISTANT

## 2018-05-14 PROCEDURE — 12000000 ZZH R&B MED SURG/OB

## 2018-05-14 PROCEDURE — 80048 BASIC METABOLIC PNL TOTAL CA: CPT | Performed by: INTERNAL MEDICINE

## 2018-05-14 PROCEDURE — A9270 NON-COVERED ITEM OR SERVICE: HCPCS | Mod: GY

## 2018-05-14 PROCEDURE — 25000128 H RX IP 250 OP 636: Performed by: INTERNAL MEDICINE

## 2018-05-14 PROCEDURE — 25000132 ZZH RX MED GY IP 250 OP 250 PS 637: Mod: GY | Performed by: HOSPITALIST

## 2018-05-14 RX ADMIN — AMLODIPINE BESYLATE 10 MG: 10 TABLET ORAL at 08:30

## 2018-05-14 RX ADMIN — LEVETIRACETAM 250 MG: 250 TABLET, FILM COATED ORAL at 21:28

## 2018-05-14 RX ADMIN — CITALOPRAM HYDROBROMIDE 20 MG: 20 TABLET ORAL at 08:30

## 2018-05-14 RX ADMIN — HYDRALAZINE HYDROCHLORIDE 50 MG: 50 TABLET ORAL at 21:28

## 2018-05-14 RX ADMIN — FINASTERIDE 5 MG: 5 TABLET, FILM COATED ORAL at 08:30

## 2018-05-14 RX ADMIN — HYDRALAZINE HYDROCHLORIDE 50 MG: 50 TABLET ORAL at 05:59

## 2018-05-14 RX ADMIN — QUETIAPINE FUMARATE 25 MG: 25 TABLET ORAL at 20:02

## 2018-05-14 RX ADMIN — HYDRALAZINE HYDROCHLORIDE 50 MG: 50 TABLET ORAL at 13:35

## 2018-05-14 RX ADMIN — PANTOPRAZOLE SODIUM 40 MG: 40 INJECTION, POWDER, FOR SOLUTION INTRAVENOUS at 08:30

## 2018-05-14 RX ADMIN — MEMANTINE 10 MG: 10 TABLET ORAL at 20:02

## 2018-05-14 RX ADMIN — DONEPEZIL HYDROCHLORIDE 10 MG: 10 TABLET ORAL at 21:28

## 2018-05-14 RX ADMIN — LEVOTHYROXINE SODIUM 125 MCG: 125 TABLET ORAL at 05:59

## 2018-05-14 RX ADMIN — MEMANTINE 10 MG: 10 TABLET ORAL at 09:45

## 2018-05-14 RX ADMIN — SODIUM CHLORIDE: 9 INJECTION, SOLUTION INTRAVENOUS at 00:46

## 2018-05-14 NOTE — PLAN OF CARE
Problem: Patient Care Overview  Goal: Discharge Needs Assessment  Outcome: No Change  Oriented to self per baseline. Calm and cooperative. VSS. Hgb check q6hr. Denies pain. Incontinent of urine. Up with assist of 2 to bedside commode. No melena noted. Tolerating full liquid diet. Plan to discharge to TCU when able.

## 2018-05-14 NOTE — PLAN OF CARE
Problem: Patient Care Overview  Goal: Plan of Care/Patient Progress Review  Outcome: No Change  Bradycardia. All other VSS. Alert & oriented to self only. Pleasant and cooperative. No complaints. Incontinent of urine this AM. Bladder scan this afternoon showing only 236mL. Needs encouragement to take po fluids. No IVF today per nephrology note. Up to  Chair for breakfast. Ambulated to BR with assist. Plan to d/c to TCU when stable. Possibly tomorrow pending kidney function and stable Hgb. Continue to monitor.

## 2018-05-14 NOTE — PROGRESS NOTES
Renal Medicine Progress Note                                Yuriy Ghosh MRN# [id number]   Age: 89 year old YOB: 1926   Date of Admission: 5/1/2018 Hospital LOS: 11                  Assessment/Plan:     Admitted post fall at home.  Course complicated by acute on chronic renal failure.  Evaluated in that context.    1.  CKD  III   -creatinine 2.0 last fall   -review of available creatinine raise concern for progressive decline in GFR  2.  Proteinuria   -dipstick positive  3.  ARF   -nonoliguric   -workup non diagnostic  4.  BPH   -continue ware  5.  HTN      Modest improvement in renal function today  Follow labs  No IVF today          Interval History:     In bed.  Follows.  IO and UO reviewed.  No complaints        ROS:     GENERAL: NAD, No fever,chills  R: NEGATIVE for significant cough or SOB  CV: NEGATIVE for chest pain, palpitations  EXT: no change edema  ROS otherwise negative    Medications and Allergies:     Reviewed    Physical Exam:     Vitals were reviewed  Patient Vitals for the past 8 hrs:   BP Temp Temp src Heart Rate Resp SpO2   05/14/18 1334 139/55 - - 58 - -   05/14/18 1126 148/57 96.7  F (35.9  C) Oral 53 16 95 %   05/14/18 0946 - - - 56 - -   05/14/18 0827 147/55 98  F (36.7  C) Oral - 18 96 %     I/O last 3 completed shifts:  In: 1957 [P.O.:1360; I.V.:297]  Out: 190 [Urine:190]    Vitals:    05/01/18 1928 05/03/18 0157 05/12/18 0230   Weight: 61.7 kg (136 lb) 65.8 kg (145 lb) 64.9 kg (143 lb 1.6 oz)         GENERAL: awake, alert, follows  HEENT: NC/AT, PERRLA, EOMI, non icteric, pharynx moist without lesion  RESP: symmetric excursion, good effort, lungs clear - no rales, rhonchi or wheezes  RESP:  clear anteriorly  CV: RRR, normal S1 S2  ABDOMEN: soft, nontender, no HSM or masses and bowel sounds normal  MS: no clubbing, cyanosis   SKIN: clear without significant rashes or lesions  NEURO: speech normal and cranial nerves 2-12 intact  EXT: warm, no edema    Data:       Recent  Labs  Lab 05/14/18  0700 05/13/18  0709 05/12/18  0625 05/11/18  0615    142 142 142   POTASSIUM 4.2 4.7 4.5 3.9   CHLORIDE 114* 113* 113* 112*   CO2 20 21 19* 23   ANIONGAP 8 8 10 7   GLC 88 104* 137* 105*   BUN 74* 85* 76* 58*   CR 3.90* 4.22* 3.76* 3.59*   GFRESTIMATED 15* 13* 15* 16*   GFRESTBLACK 18* 16* 18* 19*   TEMO 7.0* 7.0* 6.8* 7.4*         Recent Labs   Lab Test  05/14/18   0700  05/13/18   0709  05/12/18   0625  05/11/18   0615  05/10/18   0659  05/09/18   1312  05/08/18   0829  05/07/18   1042  05/05/18   0700  05/04/18   1637   CR  3.90*  4.22*  3.76*  3.59*  3.55*  3.34*  3.27*  2.95*  2.93*  2.88*       Recent Labs  Lab 05/14/18  0700 05/13/18  2355 05/13/18  1820 05/13/18  1405  05/11/18  0615   PHOS 3.6  --   --   --   --  3.0   HGB 7.5* 7.5* 7.9* 7.9*  < >  --    < > = values in this interval not displayed.      SHYLA Pemberton    [facility] - Nephrology  [phone]

## 2018-05-14 NOTE — PROGRESS NOTES
[facility]    Hospitalist Progress Note    Assessment & Plan   Yuriy Ghosh is a 89 year old male with history including hypertension, 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      Bleeding duodenal ulcer  Acute on chronic blood loss anemia   Near syncope  On the morning of 5/12 the patient had a bowel movement with maroon colored stools and then had a near syncopal episode.  A RRT was called and the patient appeared pale and a CBC was obtained.  His hemoglobin had dropped significantly from admission and was down to 5.7.  Baseline appears to be 9-10 which was where he was on presentation.  Patient received 1 unit of PRBCs on 5/12.  GI was consulted and Dr. Alcantar took the patient for an EGD on 5/12 as well and found a bleeding duodenal ulcer.  On rechecks patient has required an additional 3 units of blood for a total of 4 units now   - GI following and appreciate their recommendations  - Will continue Hgb checks q6hr with conditional units of blood ordered to transfuse if <7  - Protonix 40 mg IV daily       SARAHI on CKD  Previous Baseline 1.4? (stable in this range 2014 and 2016), Cr 2.0 in CareEverywhere 11/17.  Admission UA with > 182 RBC (possibly from traumatic cath in the ED) and >300 protein (also note UA 11/2017 with >300 protein and moderate blood).  CT stone negative for CT or hydronephrosis, but noted very large prostate.  Renal US negative for hydronephrosis.  No significant improvement in function following ware placement.  Suspect CKD due to uncontrolled HTN.  Had only been taking Norvasc for BP PTA. No ACE/ARB or diuretic or BB as had been prescribed by PCP. Denies NSAIDs.  - Cr had transiently decreased to 2.9 while on IVF.  On 5/11 patient began to have steadily worsening  renal function and was up to 4.22 on 5/13 but back to 3.9 after IVF  - Nephrology re-consulted given the worsening Cr and appreciate their recommendations       BPH   Ware placed 5/3.  Urology recommended continuing ware until seen in f/u with Dr. Pizarro 5/15/18.  PTA Cardura stopped due to his falls and he was placed on Finasteride 5 mg daily this stay.  - Continue Cardura      Essential hypertension  Patient was only receiving Norvasc 5 mg/d at home. Was suppose to be on BB, ACE and diuretic but had not been filling these.   - Continue Norvasc 10 mg daily  - Decreased Hydralazine down to 50 mg TID as blood pressures have been soft with the GI bleed  - Avoid BB due to bradycardia and Ace-I/ARB or diuretic due to CKD      Peripheral arterial disease  History of AAA endograft repair about 4 years ago.    - Holding aspirin due to above      Sinus Bradycardia  EKG on admission with HR 40-50s with Mobitz, 1.  Note drop to 40's on 5/2 after beta-blocker was re-introduced, now discontinued.  Echo with EF 45-50%, mild global hypokinesia of LV, dilated LA, 1-2+ MR and TR, Pulm HTN, mod AV stenosis (no prior for comparison). Patient does not appear to be symptomatic but difficult determine with advanced dementia.  SO states patient had not been on Metoprolol PTA.   - No further beta-blockers, rates have been stable      Mechanical Fall    No seizure activity reported. Witnessed by his wife and was reported that he tripped over the steps outside of his home and no LOC reported. Unable to determine from patient if dizzy prior to fall or any other sx's given dementia. No events other than bradycardia on tele.  TTE as above.  - Therapies recommend TCU      Hypothyroidism  - Continue levothyroxine 125 mcg daily      Seizure disorder  Keppra level <2 on admit.  Likely due to non-compliance as was placed on PTA dose with therapeutic level noted 5/10.    - Continue Keppra 250 mg QHS      Dementia  - Continue PTA memantine 10 mg  BID, donepezil 10 mg QHS  - Seroquel 25 every evening and 12.5 mg Bid prn  - Celexa has been discontinued due to prolonged QTc    # Pain Assessment:  Current Pain Score 5/14/2018   Patient currently in pain? denies   Pain score (0-10) -   Yuriy knapp pain level was assessed and he currently denies pain.        DVT Prophylaxis: Pneumatic Compression Devices  Code Status: Prior    Disposition: Expected discharge in 1-2 days once cleared by GI and nephrology.  Should go to TCU    Tutu Crooks,   Text Page (7am to 6pm)    Interval History   Patient seen and examined.  No pain currently.  Denies and difficulty breathing.  Had a bowel movement this AM without evidence of bleeding.     -Data reviewed today: I reviewed all new labs and imaging results over the last 24 hours. I personally reviewed no images or EKG's today.    Physical Exam   Temp: 98  F (36.7  C) Temp src: Oral BP: 147/55 Pulse: 54 Heart Rate: 56 Resp: 18 SpO2: 96 % O2 Device: None (Room air)    Vitals:    05/01/18 1928 05/03/18 0157 05/12/18 0230   Weight: 61.7 kg (136 lb) 65.8 kg (145 lb) 64.9 kg (143 lb 1.6 oz)     Vital Signs with Ranges  Temp:  [96.1  F (35.6  C)-98.2  F (36.8  C)] 98  F (36.7  C)  Pulse:  [54-56] 54  Heart Rate:  [] 56  Resp:  [14-18] 18  BP: (126-147)/(49-74) 147/55  SpO2:  [96 %-97 %] 96 %  I/O last 3 completed shifts:  In: 1957 [P.O.:1360; I.V.:297]  Out: 190 [Urine:190]    Constitutional: Awake, alert, cooperative, no apparent distress  Respiratory: Clear to auscultation bilaterally, no crackles or wheezing  Cardiovascular: Bradycardia, normal S1 and S2, and murmur noted  GI: Normal bowel sounds, soft, non-distended, non-tender  Skin/Integumen: No rashes, no cyanosis  MSK: No edema     Medications     - MEDICATION INSTRUCTIONS -         amLODIPine (NORVASC) tablet 10 mg  10 mg Oral Daily     citalopram (celeXA) tablet 20 mg  20 mg Oral Daily     donepezil (ARICEPT) tablet 10 mg  10 mg Oral At Bedtime     finasteride  5 mg  Oral Daily     hydrALAZINE  50 mg Oral Q8H RAMIRO     levETIRAcetam  250 mg Oral At Bedtime     levothyroxine (SYNTHROID/LEVOTHROID) tablet 125 mcg  125 mcg Oral QAM AC     memantine  10 mg Oral BID     pantoprazole (PROTONIX) IV  40 mg Intravenous Daily with breakfast     QUEtiapine  25 mg Oral At Bedtime       Data     Recent Labs  Lab 05/14/18  0700 05/13/18  2355 05/13/18  1820  05/13/18  0709  05/12/18  0625   WBC  --   --   --   --  12.0*  --  11.5*   HGB 7.5* 7.5* 7.9*  < > 6.9*  < > 5.7*  Canceled, Test credited   MCV  --   --   --   --  87  --  87   PLT  --   --   --   --  116*  --  151   INR  --   --   --   --   --   --  1.26*     --   --   --  142  --  142   POTASSIUM 4.2  --   --   --  4.7  --  4.5   CHLORIDE 114*  --   --   --  113*  --  113*   CO2 20  --   --   --  21  --  19*   BUN 74*  --   --   --  85*  --  76*   CR 3.90*  --   --   --  4.22*  --  3.76*   ANIONGAP 8  --   --   --  8  --  10   TEMO 7.0*  --   --   --  7.0*  --  6.8*   GLC 88  --   --   --  104*  --  137*   < > = values in this interval not displayed.    Imaging:   No results found for this or any previous visit (from the past 24 hour(s)).

## 2018-05-14 NOTE — PROGRESS NOTES
KIMBERLY  I: KIMBERLY updated Tamara at [facility] Adult protection and they will no longer be following at this time. If patient d/c'es home another Vulnerable adult report will need to be made.     P: SW will continue to follow and assist as needed.    Lexus Solano, MADDY   [phone]

## 2018-05-15 VITALS
HEIGHT: 67 IN | WEIGHT: 143.1 LBS | TEMPERATURE: 95.9 F | RESPIRATION RATE: 16 BRPM | BODY MASS INDEX: 22.46 KG/M2 | OXYGEN SATURATION: 96 % | HEART RATE: 54 BPM | SYSTOLIC BLOOD PRESSURE: 156 MMHG | DIASTOLIC BLOOD PRESSURE: 66 MMHG

## 2018-05-15 LAB
ANION GAP SERPL CALCULATED.3IONS-SCNC: 8 MMOL/L (ref 3–14)
BUN SERPL-MCNC: 68 MG/DL (ref 7–30)
CALCIUM SERPL-MCNC: 7.3 MG/DL (ref 8.5–10.1)
CHLORIDE SERPL-SCNC: 112 MMOL/L (ref 94–109)
CO2 SERPL-SCNC: 21 MMOL/L (ref 20–32)
CREAT SERPL-MCNC: 3.98 MG/DL (ref 0.66–1.25)
GFR SERPL CREATININE-BSD FRML MDRD: 14 ML/MIN/1.7M2
GLUCOSE SERPL-MCNC: 89 MG/DL (ref 70–99)
HGB BLD-MCNC: 7.8 G/DL (ref 13.3–17.7)
INTERPRETATION ECG - MUSE: NORMAL
POTASSIUM SERPL-SCNC: 4.1 MMOL/L (ref 3.4–5.3)
SODIUM SERPL-SCNC: 141 MMOL/L (ref 133–144)

## 2018-05-15 PROCEDURE — 36415 COLL VENOUS BLD VENIPUNCTURE: CPT | Performed by: INTERNAL MEDICINE

## 2018-05-15 PROCEDURE — A9270 NON-COVERED ITEM OR SERVICE: HCPCS | Mod: GY | Performed by: UROLOGY

## 2018-05-15 PROCEDURE — 25000132 ZZH RX MED GY IP 250 OP 250 PS 637: Mod: GY | Performed by: UROLOGY

## 2018-05-15 PROCEDURE — A9270 NON-COVERED ITEM OR SERVICE: HCPCS | Mod: GY

## 2018-05-15 PROCEDURE — 25000132 ZZH RX MED GY IP 250 OP 250 PS 637: Mod: GY | Performed by: INTERNAL MEDICINE

## 2018-05-15 PROCEDURE — A9270 NON-COVERED ITEM OR SERVICE: HCPCS | Mod: GY | Performed by: PHYSICIAN ASSISTANT

## 2018-05-15 PROCEDURE — 25000132 ZZH RX MED GY IP 250 OP 250 PS 637: Mod: GY | Performed by: PHYSICIAN ASSISTANT

## 2018-05-15 PROCEDURE — A9270 NON-COVERED ITEM OR SERVICE: HCPCS | Mod: GY | Performed by: INTERNAL MEDICINE

## 2018-05-15 PROCEDURE — 80048 BASIC METABOLIC PNL TOTAL CA: CPT | Performed by: INTERNAL MEDICINE

## 2018-05-15 PROCEDURE — A9270 NON-COVERED ITEM OR SERVICE: HCPCS | Mod: GY | Performed by: HOSPITALIST

## 2018-05-15 PROCEDURE — 99239 HOSP IP/OBS DSCHRG MGMT >30: CPT | Performed by: HOSPITALIST

## 2018-05-15 PROCEDURE — 25000132 ZZH RX MED GY IP 250 OP 250 PS 637: Mod: GY | Performed by: HOSPITALIST

## 2018-05-15 PROCEDURE — 85018 HEMOGLOBIN: CPT | Performed by: INTERNAL MEDICINE

## 2018-05-15 PROCEDURE — 25000132 ZZH RX MED GY IP 250 OP 250 PS 637: Mod: GY

## 2018-05-15 RX ORDER — HYDRALAZINE HYDROCHLORIDE 50 MG/1
50 TABLET, FILM COATED ORAL 3 TIMES DAILY
Qty: 60 TABLET | Status: ON HOLD | DISCHARGE
Start: 2018-05-15 | End: 2018-05-30

## 2018-05-15 RX ORDER — PANTOPRAZOLE SODIUM 40 MG/1
40 TABLET, DELAYED RELEASE ORAL EVERY MORNING
Qty: 30 TABLET | Status: ON HOLD | DISCHARGE
Start: 2018-05-15 | End: 2018-05-30

## 2018-05-15 RX ORDER — PANTOPRAZOLE SODIUM 40 MG/1
40 TABLET, DELAYED RELEASE ORAL EVERY MORNING
Status: DISCONTINUED | OUTPATIENT
Start: 2018-05-15 | End: 2018-05-15 | Stop reason: HOSPADM

## 2018-05-15 RX ADMIN — LEVOTHYROXINE SODIUM 125 MCG: 125 TABLET ORAL at 06:30

## 2018-05-15 RX ADMIN — MEMANTINE 10 MG: 10 TABLET ORAL at 10:19

## 2018-05-15 RX ADMIN — HYDRALAZINE HYDROCHLORIDE 50 MG: 50 TABLET ORAL at 06:30

## 2018-05-15 RX ADMIN — PANTOPRAZOLE SODIUM 40 MG: 40 TABLET, DELAYED RELEASE ORAL at 07:38

## 2018-05-15 RX ADMIN — HYDRALAZINE HYDROCHLORIDE 50 MG: 50 TABLET ORAL at 13:38

## 2018-05-15 RX ADMIN — CITALOPRAM HYDROBROMIDE 20 MG: 20 TABLET ORAL at 07:37

## 2018-05-15 RX ADMIN — AMLODIPINE BESYLATE 10 MG: 10 TABLET ORAL at 07:37

## 2018-05-15 RX ADMIN — FINASTERIDE 5 MG: 5 TABLET, FILM COATED ORAL at 07:37

## 2018-05-15 NOTE — PLAN OF CARE
Problem: Patient Care Overview  Goal: Plan of Care/Patient Progress Review  Outcome: No Change  Slightly hypertensive; on scheduled BP meds. All other VSS. Oriented only to self but pleasant and redirectable. No complaints. Did pull out IV this AM. Not replaced; MD aware Incontinent of urine. No stools today. Teds on BLE. Up to chair for both meals. Up with assist of 1 GB and walker; unsteady. Plan for d/c to [facility] at 1600 via stretcher. Will have f/u with neph and GI. Continue to monitor.

## 2018-05-15 NOTE — PROGRESS NOTES
Kimberly Progress Note  Chart Reviewed, Pt discussed in Interdisciplinary Rounds.   Anticipate that pt could be medically cleared today for discharge to [facility] .    Intervention:   KIMBERLY called and left message with Emeterio at [facility] to verify room for today.  Pt needs medical clearnce from multiple disciplines prior to discharge.   Discharge orders are entered into epic. Discharge orders sent via [facility].  In anticipation of discharge, transportation has been ordered ahead of time through . Cassie has scheduled a stretcher transport for 16:00 today. PCS form completed and on pt chart. Pt requires supervision during transport for safety because of his confusion; he may require redirection during transport.  Pt and SO updated with discharge plan. Eva indicates that she will meet pt at [facility] this afternoon.  Team Members notified:   RN,CC,HUC,BB    PAS-RR    D: Per DHS regulation, KIMBERLY completed and submitted PAS-RR to [place] [employer] Direct Connect via the Senior LinkAge Line.  PAS-RR confirmation # is : [id number]  P: Further questions may be directed to [facility] LinkAge Line at #[phone], option #4 for PAS-RR staff.        Plan: Discharge to [facility] today via  stretcher at    MICHAEL Robert  [facility]  Phone: [phone]   16:00st

## 2018-05-15 NOTE — PLAN OF CARE
Problem: Patient Care Overview  Goal: Plan of Care/Patient Progress Review  Outcome: No Change  Patient is alert to self only. VSS except bradycardic and hypertensive. Long arm present for shift, but patient calm, cooperative, and non-impulsive. BMx1 this shift, small and maroon in color. Incontinent of urine. Encouraging oral fluid intake d/t IVF d/c today and elevated creatinine. Dressing to R knee CDI. Up with A1-A2, walker and gait belt to bathroom . Possible d/c tomorrow to TCU pending kidney function test and Hgb level. Continue to monitor.

## 2018-05-15 NOTE — PLAN OF CARE
Problem: Patient Care Overview  Goal: Plan of Care/Patient Progress Review  Outcome: Improving  Alert to self only. VSS ex elevated BP, on RA. Pt impulsive at times but redirectable. Pt had 1 BM overnight, maroon in color. Incontinent at times. R knee dressing CDI. Ax1 with walker/gb to bathroom. Encouraging fluid intake. Possible d/c today to TCU pending kidney function test and Hgb level. Continue to monitor.

## 2018-05-15 NOTE — PROGRESS NOTES
Renal Medicine Progress Note                                Yuriy Ghosh MRN# [id number]   Age: 89 year old YOB: 1926   Date of Admission: 5/1/2018 Hospital LOS: 12                  Assessment/Plan:     Admitted post fall at home.  Course complicated by acute on chronic renal failure.  Evaluated in that context.    1.  CKD  III   -creatinine 2.0 last fall   -review of available creatinine raise concern for progressive decline in GFR  2.  Proteinuria   -dipstick positive  3.  ARF   -nonoliguric   -workup non diagnostic  4.  BPH   -continue ware  5.  HTN      OK for DC from renal standpoint  FU renal NP 2 to 3 weeks  BMP next 5 to 7 days    No diuretic at this time          Interval History:     Up at bedside eating lunch.  Follows.  IO and UO reviewed.  No complaints.    IO and UO reviewed      ROS:     GENERAL: NAD, No fever,chills  R: NEGATIVE for significant cough or SOB  CV: NEGATIVE for chest pain, palpitations  EXT: no change edema  ROS otherwise negative    Medications and Allergies:     Reviewed    Physical Exam:     Vitals were reviewed  Patient Vitals for the past 8 hrs:   BP Temp Temp src Heart Rate Resp SpO2   05/15/18 0738 169/72 97  F (36.1  C) Oral 55 16 94 %   05/15/18 0630 178/71 - - - - -     I/O last 3 completed shifts:  In: 340 [P.O.:340]  Out: -     Vitals:    05/01/18 1928 05/03/18 0157 05/12/18 0230   Weight: 61.7 kg (136 lb) 65.8 kg (145 lb) 64.9 kg (143 lb 1.6 oz)         GENERAL: awake, alert, follows  HEENT: NC/AT, PERRLA, EOMI, non icteric, pharynx moist without lesion  RESP: symmetric excursion, good effort, lungs clear - no rales, rhonchi or wheezes  RESP:  clear anteriorly  CV: RRR, normal S1 S2  ABDOMEN: soft, nontender, no HSM or masses and bowel sounds normal  MS: no clubbing, cyanosis   SKIN: clear without significant rashes or lesions  NEURO: speech normal and cranial nerves 2-12 intact  EXT: warm, no edema    Data:       Recent Labs  Lab 05/15/18  0781  05/14/18  0700 05/13/18  0709 05/12/18  0625    142 142 142   POTASSIUM 4.1 4.2 4.7 4.5   CHLORIDE 112* 114* 113* 113*   CO2 21 20 21 19*   ANIONGAP 8 8 8 10   GLC 89 88 104* 137*   BUN 68* 74* 85* 76*   CR 3.98* 3.90* 4.22* 3.76*   GFRESTIMATED 14* 15* 13* 15*   GFRESTBLACK 17* 18* 16* 18*   TEMO 7.3* 7.0* 7.0* 6.8*         Recent Labs   Lab Test  05/15/18   0725  05/14/18   0700  05/13/18   0709  05/12/18   0625  05/11/18   0615  05/10/18   0659  05/09/18   1312  05/08/18   0829  05/07/18   1042  05/05/18   0700   CR  3.98*  3.90*  4.22*  3.76*  3.59*  3.55*  3.34*  3.27*  2.95*  2.93*       Recent Labs  Lab 05/15/18  0725 05/14/18  0700 05/13/18  2355 05/13/18  1820  05/11/18  0615   PHOS  --  3.6  --   --   --  3.0   HGB 7.8* 7.5* 7.5* 7.9*  < >  --    < > = values in this interval not displayed.      SHYLA Pemberton    [facility] Nephrology  [phone]

## 2018-05-15 NOTE — PROGRESS NOTES
Patient discharged at 4:30 PM to [facility]. IV was discontinued. Pain at time of discharge was 0. Belongings returned to patient. Discharge instructions and medications reviewed with patient. Patient verbalized understanding and all questions were answered. Prescriptions given to patient. At time of discharge, patient condition was stable and left the unit [facility].

## 2018-05-15 NOTE — PLAN OF CARE
Problem: Patient Care Overview  Goal: Plan of Care/Patient Progress Review  PT: Patient discharging at 4pm to TCU. PT goals not met.    Physical Therapy Discharge Summary    Reason for therapy discharge:    Discharged to transitional care facility.    Progress towards therapy goal(s). See goals on Care Plan in [facility] electronic health record for goal details.  Goals not met.  Barriers to achieving goals:   discharge from facility.    Therapy recommendation(s):    Continued therapy is recommended.  Rationale/Recommendations:  eval and treat at TCU.

## 2018-05-15 NOTE — PROVIDER NOTIFICATION
Spoke with Dr Winslow from Nephrology at 1130. Ok for pt to d/c. Would like labs later this week and f/u with NP in 2-3 weeks.    Spoke with Dr Alcantar from  at 1130. Ok to d/c. Would like f/u in 2 weeks.     Updated Dr Scott via text page.

## 2018-05-15 NOTE — PROGRESS NOTES
BRIEF NUTRITION REASSESSMENT      REASON FOR REASSESSMENT:  LOS      CURRENT DIET AND INTAKE:  Diet:  Regular           Not Appropriate for Room Service              Chart reviewed  Pt alert to self only  Plan dc to TCU when stable  He has been receiving standard meal trays TID  Flowsheets reflect intake of % meals  Breakfast today - OJ, milk, Cheerios, eggs, English muffin    ANTHROPOMETRICS:  Vitals:    05/01/18 1928 05/03/18 0157 05/12/18 0230   Weight: 61.7 kg (136 lb) 65.8 kg (145 lb) 64.9 kg (143 lb 1.6 oz)         LABS:  Labs noted      NUTRITION INTERVENTION:  Nutrition Diagnosis:  No nutrition diagnosis at this time.    Implementation:  No intervention at this time    FOLLOW UP/MONITORING:   Will re-evaluate in 7 - 10 days, or sooner, if re-consulted.

## 2018-05-15 NOTE — DISCHARGE SUMMARY
[facility]    Discharge Summary  Hospitalist    Date of Admission:  5/1/2018  Date of Discharge:  5/15/2018  Discharging Provider: Tutu Scott    Discharge Diagnoses      Acute renal failure, unspecified acute renal failure type (H)  Fall, initial encounter  Dehydration  Altered mental status, unspecified altered mental status type  Generalized muscle weakness  Multiple joint pain  Benign essential hypertension  Dementia with behavioral disturbance, unspecified dementia type  Benign prostatic hyperplasia with urinary retention  PUD (peptic ulcer disease)  PAD  Sinus bradycardia  Seizure disorder  Anemia  Thrombocytopenia    History of Present Illness   Yuriy Ghosh is an 89 year old male who presented with fall, found to have SARAHI on CKD.    Hospital Course     Yuriy Ghosh was admitted on 5/1/2018.  The following problems were addressed during his hospitalization:     Bleeding duodenal ulcer  Acute on chronic blood loss anemia   Near syncope  On the morning of 5/12 the patient had a bowel movement with maroon colored stools and then had a near syncopal episode.  A RRT was called and the patient appeared pale and a CBC was obtained.  His hemoglobin had dropped significantly from admission and was down to 5.7.  Baseline appears to be 9-10 which was where he was on presentation.  Patient received 1 unit of PRBCs on 5/12.  GI was consulted and Dr. Alcantar took the patient for an EGD on 5/12 as well and found a bleeding duodenal ulcer.  On rechecks patient has required an additional 3 units of blood for a total of 4 units.  Hb has stabilized.  GI okay with DC.  Continue on Protonix 40 mg PO daily. Follow up with Dr. Monterroso in 2 weeks time.    SARAHI on CKD  Previous Baseline 1.4? (stable in this range 2014 and 2016), Cr 2.0 in CareEverywhere 11/17.  Admission UA with > 182 RBC (possibly from traumatic cath in the ED) and >300 protein (also note UA 11/2017 with >300 protein and moderate blood).   CT stone negative for hydronephrosis, but noted very large prostate.  Renal US negative.  No significant improvement in function following ware placement.  Suspect CKD due to uncontrolled HTN and vascular disease.  Had only been taking Norvasc for BP PTA (non-compliant with ACE/ARB, diuretic and BB as had been prescribed by PCP). Denies NSAIDs.  Nephrology consulted, recommend follow up in clinic in 2-4 weeks.  Cr stable at time of discharge.      BPH   Ware placed 5/3, Urology removed 5/13/2018, no specific f/u necessarily inidicated unless px having trouble with urination. Was going to follow up with Dr. Pizarro 5/15/18.  PTA Cardura stopped due to his falls and he was placed on Finasteride 5 mg daily this stay which we will continue.      Essential hypertension:   Norvasc increased to 10 mg daily and initiated on hydralazine which was titrated to 100 mg qid initially but then down to 50 bg TID which we will DC on.  No ACE/ARB or diuretic due to CKD, no BB due to bradycardia.       Peripheral arterial disease:  History of AAA endograft repair about 4 years ago.  Holding aspirin due to PUD, may restart in future after further review.       Sinus Bradycardia  EKG on admission with HR 40-50s with Mobitz, 1.  Note drop to 40's on 5/2 after beta-blocker was re-introduced, now discontinued.  Echo with EF 45-50%, mild global hypokinesia of LV, dilated LA, 1-2+ MR and TR, Pulm HTN, mod AV stenosis (no prior for comparison). Patient does not appear to be symptomatic but difficult determine with advanced dementia.  Rates stable without further beta-blockers.      Mechanical Fall    No seizure activity reported. Witnessed by his wife and was reported that he tripped over the steps outside of his home and no LOC reported. Unable to determine from patient if dizzy prior to fall or any other sx's given dementia. No events other than bradycardia on tele.  TTE as above.      Hypothyroidism:  Continue levothyroxine 125 mcg  daily      Seizure disorder:   Keppra level <2 on admit.  Likely due to non-compliance as was placed on PTA dose with therapeutic level noted 5/10.  Continue Keppra 250 mg QHS      Dementia:  Continue PTA memantine 10 mg BID, donepezil 10 mg QHS.  Placed on Seroquel every evening and prn with noted prolongation of QTc so PTA Celexa discontinued at discharge.      # Discharge Pain Plan:   - Patient currently has NO PAIN and is not being prescribed pain medications on discharge.     Significant Results and Procedures   none    Pending Results   These results will be followed up by none  Unresulted Labs Ordered in the Past 30 Days of this Admission     No orders found from 3/2/2018 to 5/2/2018.          Code Status   DNR / DNI       Primary Care Physician   Kal Guadalupe    Physical Exam   Temp: 97  F (36.1  C) Temp src: Oral BP: 169/72   Heart Rate: 55 Resp: 16 SpO2: 94 % O2 Device: None (Room air)    Vitals:    05/01/18 1928 05/03/18 0157 05/12/18 0230   Weight: 61.7 kg (136 lb) 65.8 kg (145 lb) 64.9 kg (143 lb 1.6 oz)     Vital Signs with Ranges  Temp:  [96.7  F (35.9  C)-97.9  F (36.6  C)] 97  F (36.1  C)  Heart Rate:  [52-71] 55  Resp:  [14-16] 16  BP: (138-178)/(52-88) 169/72  SpO2:  [94 %-95 %] 94 %  I/O last 3 completed shifts:  In: 340 [P.O.:340]  Out: -     Constitutional: NAD, Alert and oriented, pleasantly confused at times, is Conversational  HEENT:  PERRL, Atraumatic, Neck is supple  Respiratory: CTAB, No Wheeze, Rhonchi, or Crackles appreciated.   Cardiovascular: RRR, No m/r/r  GI: Soft, Non-tender, Non-distended.  Ext: No lower extremity edema  Skin/Integumen: Intact, No erythema  Neuro: No new focal deficits appreciated  Psych:  Appropriate Affect   Other:      Discharge Disposition   Discharged to nursing home  Condition at discharge: Stable    Consultations This Hospital Stay   SOCIAL WORK IP CONSULT  PHYSICAL THERAPY ADULT IP CONSULT  UROLOGY IP CONSULT  NEPHROLOGY IP CONSULT  OCCUPATIONAL  THERAPY ADULT IP CONSULT  PHYSICAL THERAPY ADULT IP CONSULT  GASTROENTEROLOGY IP CONSULT    Time Spent on this Encounter   I, Tutu Scott, personally saw the patient today and spent greater than 30 minutes discharging this patient.    Discharge Orders     AntiEmbolism Stockings   Bilateral below knee length.On in the morning, off at night     General info for SNF   Length of Stay Estimate: Short Term Care: Estimated # of Days <30  Condition at Discharge: Improving  Level of care:skilled   Rehabilitation Potential: Fair  Admission H&P remains valid and up-to-date: Yes  Recent Chemotherapy: N/A  Use Nursing Home Standing Orders: N/A     Mantoux instructions   Give two-step Mantoux (PPD) Per Facility Policy Yes     Reason for your hospital stay   You were admitted for fall.     Activity - Up with nursing assistance     Follow Up and recommended labs and tests   Follow up with Nursing home physician.  Repeat BNP for cr stability.  Assess testicular lesion and if needed Dermatology referral  Follow up with Sherie Farrell NP, [facility] (Nephrology), [phone], in 2-4 weeks.   Follow up with Dr. Pizarro of Urology was scheduled on 5/15, but since ware out, may follow up prn.  Follow up with Dermatology if NH physician deems appropriate     DNR/DNI     Occupational Therapy Adult Consult   Evaluate and treat as clinically indicated.    Reason:  deconditioning     Physical Therapy Adult Consult   Evaluate and treat as clinically indicated.    Reason:  deconditioning     Fall precautions     Advance Diet as Tolerated   Follow this diet upon discharge:     Combination Diet Low Saturated Fat Na <2400mg Diet       Discharge Medications   Current Discharge Medication List      START taking these medications    Details   acetaminophen (TYLENOL) 325 MG tablet Take 2 tablets (650 mg) by mouth every 4 hours as needed for mild pain or fever  Qty: 100 tablet    Associated Diagnoses: Multiple joint pain       finasteride (PROSCAR) 5 MG tablet Take 1 tablet (5 mg) by mouth daily  Qty: 30 tablet    Associated Diagnoses: Benign prostatic hyperplasia with urinary retention      hydrALAZINE (APRESOLINE) 50 MG tablet Take 1 tablet (50 mg) by mouth 3 times daily  Qty: 60 tablet    Associated Diagnoses: Benign essential hypertension      pantoprazole (PROTONIX) 40 MG EC tablet Take 1 tablet (40 mg) by mouth every morning  Qty: 30 tablet    Associated Diagnoses: PUD (peptic ulcer disease)      !! QUEtiapine (SEROQUEL) 25 MG tablet Take 1 tablet (25 mg) by mouth At Bedtime  Qty: 60 tablet    Associated Diagnoses: Dementia with behavioral disturbance, unspecified dementia type      !! QUEtiapine (SEROQUEL) 25 MG tablet Take 0.5 tablets (12.5 mg) by mouth 3 times daily as needed (restlessness/agitation)  Qty: 60 tablet    Associated Diagnoses: Dementia with behavioral disturbance, unspecified dementia type       !! - Potential duplicate medications found. Please discuss with provider.      CONTINUE these medications which have CHANGED    Details   amLODIPine (NORVASC) 10 MG tablet Take 1 tablet (10 mg) by mouth daily  Qty: 30 tablet, Refills: 2    Associated Diagnoses: Benign essential hypertension         CONTINUE these medications which have NOT CHANGED    Details   ASPIRIN PO Take 81 mg by mouth daily      calcium citrate-vitamin D (CITRACAL) 315-250 MG-UNIT TABS per tablet Take 1 tablet by mouth 2 times daily      CYANOCOBALAMIN PO Take 2,500 mcg by mouth daily      DONEPEZIL HCL PO Take 10 mg by mouth At Bedtime      FOLIC ACID PO Take 400 mcg by mouth daily      LevETIRAcetam (KEPPRA PO) Take 250 mg by mouth At Bedtime      LEVOTHYROXINE SODIUM PO Take 125 mcg by mouth daily      MEMANTINE HCL PO Take 10 mg by mouth 2 times daily      VITAMIN D, CHOLECALCIFEROL, PO Take 1,000 Units by mouth daily         STOP taking these medications       Ascorbic Acid (VITAMIN C PO) Comments:   Reason for Stopping:         Biotin 5000 MCG  CAPS Comments:   Reason for Stopping:         CITALOPRAM HYDROBROMIDE PO Comments:   Reason for Stopping:         DOXAZOSIN MESYLATE PO Comments:   Reason for Stopping:         fish oil-omega-3 fatty acids 1000 MG capsule Comments:   Reason for Stopping:         Garlic 1000 MG CAPS Comments:   Reason for Stopping:         GINKGO BILOBA MEMORY ENHANCER PO Comments:   Reason for Stopping:         Glucosamine-Chondroitin 750-600 MG CHEW Comments:   Reason for Stopping:         LISINOPRIL PO Comments:   Reason for Stopping:         METOPROLOL SUCCINATE ER PO Comments:   Reason for Stopping:         Multiple Vitamins-Minerals (MULTIVITAMIN & MINERAL PO) Comments:   Reason for Stopping:         olmesartan-hydrochlorothiazide (BENICAR) 20-12.5 MG per tablet Comments:   Reason for Stopping:         PRAVASTATIN SODIUM PO Comments:   Reason for Stopping:         Turmeric 450 MG CAPS Comments:   Reason for Stopping:             Allergies   No Active Allergies  Data   Most Recent 3 CBC's:  Recent Labs   Lab Test  05/15/18   0725  05/14/18   0700  05/13/18   2355   05/13/18   0709   05/12/18   0625   05/02/18   0627   WBC   --    --    --    --   12.0*   --   11.5*   --   7.0   HGB  7.8*  7.5*  7.5*   < >  6.9*   < >  5.7*  Canceled, Test credited   < >  9.0*   MCV   --    --    --    --   87   --   87   --   88   PLT   --    --    --    --   116*   --   151   --   109*    < > = values in this interval not displayed.      Most Recent 3 BMP's:  Recent Labs   Lab Test  05/15/18   0725  05/14/18   0700  05/13/18   0709   NA  141  142  142   POTASSIUM  4.1  4.2  4.7   CHLORIDE  112*  114*  113*   CO2  21  20  21   BUN  68*  74*  85*   CR  3.98*  3.90*  4.22*   ANIONGAP  8  8  8   TEMO  7.3*  7.0*  7.0*   GLC  89  88  104*     Most Recent 2 LFT's:  Recent Labs   Lab Test  05/01/18   1715   AST  14   ALT  11   ALKPHOS  74   BILITOTAL  0.3     Most Recent INR's and Anticoagulation Dosing History:  Anticoagulation Dose History      Recent Dosing and Labs Latest Ref Rng & Units 5/1/2018 5/12/2018    INR 0.86 - 1.14 1.15(H) 1.26(H)        Most Recent 3 Troponin's:  Recent Labs   Lab Test  05/01/18   1715   TROPI  0.020     Most Recent Cholesterol Panel:No lab results found.  Most Recent 6 Bacteria Isolates From Any Culture (See EPIC Reports for Culture Details):  Recent Labs   Lab Test  05/13/18   1625   CULT  10,000 to 50,000 colonies/mL  mixed urogenital michael  Susceptibility testing not routinely done       Most Recent TSH, T4 and A1c Labs:No lab results found.  Results for orders placed or performed during the hospital encounter of 05/01/18   CT Head w/o Contrast    Narrative    CT SCAN OF THE HEAD WITHOUT CONTRAST   5/1/2018 6:06 PM     HISTORY: Fall, head injury.    TECHNIQUE: Axial images of the head and coronal reformations without  IV contrast material. Radiation dose for this scan was reduced using  automated exposure control, adjustment of the mA and/or kV according  to patient size, or iterative reconstruction technique.    COMPARISON: None.    FINDINGS: There is no evidence of intracranial hemorrhage, mass, acute  infarct or anomaly. There is generalized atrophy of the brain. There  is low attenuation in the white matter of the cerebral hemispheres  consistent with sequelae of small vessel ischemic disease.     The visualized portions of the sinuses and mastoids appear normal.  Right vertex scalp soft tissue injury without underlying fracture.      Impression    IMPRESSION:     1. No evidence of acute intracranial hemorrhage, mass, or herniation.  2. There is generalized atrophy of the brain. White matter changes are  present in the cerebral hemispheres that are consistent with small  vessel ischemic disease in this age patient.   3. Right vertex scalp soft tissue injury without underlying fracture.      AJITH FANG MD   XR Elbow Right G/E 3 Views    Narrative    RIGHT ELBOW THREE OR MORE VIEWS  5/1/2018 5:37 PM      HISTORY:  Fall, trauma.    COMPARISON: None.      Impression    IMPRESSION: No acute fracture or dislocation.    LAN VILLARREAL MD   CT Abdomen Pelvis w/o Contrast    Narrative    CT ABDOMEN AND PELVIS WITHOUT CONTRAST 5/2/2018 8:25 AM     HISTORY: SARAHI, hematuria. Evaluate for stone and hydronephrosis.     COMPARISON: None.    TECHNIQUE: Axial CT images of the abdomen and pelvis from the  diaphragm to the symphysis pubis were acquired without IV contrast.  Radiation dose for this scan was reduced using automated exposure  control, adjustment of the mA and/or kV according to patient size, or  iterative reconstruction technique.    FINDINGS: There are no stones seen within either kidney, either  ureter, or the bladder. There is no hydroureter or hydronephrosis.  There is no perinephric fat stranding. Kidneys are normal in size and  configuration. There is marked prostatomegaly with the prostate  measuring 7.4 x 5.9 x 6.3 cm. Multiple bilateral renal cysts. A few  small hyperdense lesions are seen compatible with proteinaceous or  hemorrhagic cysts in both kidneys. Saccular infrarenal abdominal  aortic aneurysm repair changes. No free air or free fluid. There are  no dilated loops of small intestine or large bowel to suggest ileus or  obstruction. Cholecystectomy changes. Liver unremarkable. No  splenomegaly. No definite adrenal nodules. Pancreas grossly  unremarkable. The remainder of the visualized abdomen is unremarkable  on this noncontrast scan. Survey of the visualized bony structures  demonstrates no destructive bony lesions. Minimal bilateral effusions  and mild-moderate interlobular septal thickening/interstitial changes  at both lung bases.      Impression    IMPRESSION:   1. No hydronephrosis.  2. Marked prostatomegaly, bladder is not particularly distended above  this and there is no hydronephrosis to suggest a bladder outlet  obstruction, but this cannot be excluded.    JESS CAREY MD   US Renal Complete     Narrative    ULTRASOUND RETROPERITONEAL COMPLETE 5/4/2018 3:24 PM     HISTORY: 89-year-old male with acute kidney injury on chronic disease.  Evaluate for any acute pathology.    COMPARISON: CT 5/2/2018    FINDINGS: The bilateral renal parenchyma are abnormally increased in  echogenicity without evidence for shadowing stone or mass. There are  numerous bilateral renal cysts, measuring up to 4.5 cm on the right  and 4.7 cm on the left.. No hydronephrosis. Right kidney measures 11.9  x 5.5 x 6.0 cm and the left measures 11.4 x 5.3 x 6.1 cm. Cortical  thickness is 1.4 cm on the right and 1.9 cm on the left. Bladder is  decompressed by a Rodriguez catheter.      Impression    IMPRESSION: Increased echogenicity of the kidneys compatible with  medical renal disease. Numerous simple appearing cysts. No  hydronephrosis.    GEORGINA MORGAN MD

## 2018-05-16 ENCOUNTER — RECORDS - HEALTHEAST (OUTPATIENT)
Dept: LAB | Facility: CLINIC | Age: 83
End: 2018-05-16

## 2018-05-16 LAB — INTERPRETATION ECG - MUSE: NORMAL

## 2018-05-18 LAB
ANION GAP SERPL CALCULATED.3IONS-SCNC: 9 MMOL/L (ref 5–18)
BUN SERPL-MCNC: 54 MG/DL (ref 8–28)
CALCIUM SERPL-MCNC: 7.8 MG/DL (ref 8.5–10.5)
CHLORIDE BLD-SCNC: 113 MMOL/L (ref 98–107)
CO2 SERPL-SCNC: 19 MMOL/L (ref 22–31)
CREAT SERPL-MCNC: 4.02 MG/DL (ref 0.7–1.3)
CREAT SERPL-MCNC: 4.02 MG/DL (ref 0.7–1.3)
ERYTHROCYTE [DISTWIDTH] IN BLOOD BY AUTOMATED COUNT: 16.8 % (ref 11–14.5)
GFR SERPL CREATININE-BSD FRML MDRD: 14 ML/MIN/1.73M2
GFR SERPL CREATININE-BSD FRML MDRD: 14 ML/MIN/1.73M2
GLUCOSE BLD-MCNC: 81 MG/DL (ref 70–125)
GLUCOSE SERPL-MCNC: 81 MG/DL (ref 70–125)
HCT VFR BLD AUTO: 24 % (ref 40–54)
HGB BLD-MCNC: 7.5 G/DL (ref 14–18)
MCH RBC QN AUTO: 29.9 PG (ref 27–34)
MCHC RBC AUTO-ENTMCNC: 31.3 G/DL (ref 32–36)
MCV RBC AUTO: 96 FL (ref 80–100)
PLATELET # BLD AUTO: 208 THOU/UL (ref 140–440)
PMV BLD AUTO: 9.5 FL (ref 8.5–12.5)
POTASSIUM BLD-SCNC: 4.5 MMOL/L (ref 3.5–5)
POTASSIUM SERPL-SCNC: 4.5 MMOL/L (ref 3.5–5)
RBC # BLD AUTO: 2.51 MILL/UL (ref 4.4–6.2)
SODIUM SERPL-SCNC: 141 MMOL/L (ref 136–145)
WBC: 11.4 THOU/UL (ref 4–11)

## 2018-05-20 ENCOUNTER — RECORDS - HEALTHEAST (OUTPATIENT)
Dept: LAB | Facility: CLINIC | Age: 83
End: 2018-05-20

## 2018-05-21 ENCOUNTER — RECORDS - HEALTHEAST (OUTPATIENT)
Dept: LAB | Facility: CLINIC | Age: 83
End: 2018-05-21

## 2018-05-21 LAB
ERYTHROCYTE [DISTWIDTH] IN BLOOD BY AUTOMATED COUNT: 16.2 % (ref 11–14.5)
HCT VFR BLD AUTO: 22.3 % (ref 40–54)
HGB BLD-MCNC: 7 G/DL (ref 14–18)
MCH RBC QN AUTO: 29.8 PG (ref 27–34)
MCHC RBC AUTO-ENTMCNC: 31.4 G/DL (ref 32–36)
MCV RBC AUTO: 95 FL (ref 80–100)
PLATELET # BLD AUTO: 237 THOU/UL (ref 140–440)
PMV BLD AUTO: 9.2 FL (ref 8.5–12.5)
RBC # BLD AUTO: 2.35 MILL/UL (ref 4.4–6.2)
WBC: 10.1 THOU/UL (ref 4–11)

## 2018-05-22 ENCOUNTER — TRANSFERRED RECORDS (OUTPATIENT)
Dept: HEALTH INFORMATION MANAGEMENT | Facility: CLINIC | Age: 83
End: 2018-05-22

## 2018-05-22 LAB — HGB BLD-MCNC: 7.5 G/DL (ref 14–18)

## 2018-05-27 ENCOUNTER — HOSPITAL ENCOUNTER (INPATIENT)
Facility: CLINIC | Age: 83
LOS: 5 days | Discharge: NURSING, REHAB OR LONG-TERM CARE | DRG: 291 | End: 2018-06-01
Attending: EMERGENCY MEDICINE | Admitting: HOSPITALIST
Payer: MEDICARE

## 2018-05-27 ENCOUNTER — MEDICAL CORRESPONDENCE (OUTPATIENT)
Dept: HEALTH INFORMATION MANAGEMENT | Facility: CLINIC | Age: 83
End: 2018-05-27

## 2018-05-27 ENCOUNTER — APPOINTMENT (OUTPATIENT)
Dept: GENERAL RADIOLOGY | Facility: CLINIC | Age: 83
DRG: 291 | End: 2018-05-27
Attending: EMERGENCY MEDICINE
Payer: MEDICARE
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
## 2018-05-27 DIAGNOSIS — N17.9 ACUTE RENAL INJURY (H): ICD-10-CM

## 2018-05-27 DIAGNOSIS — D62 ANEMIA DUE TO BLOOD LOSS, ACUTE: ICD-10-CM

## 2018-05-27 DIAGNOSIS — J18.9 HOSPITAL-ACQUIRED PNEUMONIA: ICD-10-CM

## 2018-05-27 DIAGNOSIS — I50.21 ACUTE SYSTOLIC CONGESTIVE HEART FAILURE (H): ICD-10-CM

## 2018-05-27 DIAGNOSIS — Z51.5 END OF LIFE CARE: Primary | ICD-10-CM

## 2018-05-27 PROBLEM — J96.01 ACUTE RESPIRATORY FAILURE WITH HYPOXIA (H): Status: ACTIVE | Noted: 2018-05-27

## 2018-05-27 LAB
ANION GAP SERPL CALCULATED.3IONS-SCNC: 9 MMOL/L (ref 3–14)
BASOPHILS # BLD AUTO: 0 10E9/L (ref 0–0.2)
BASOPHILS NFR BLD AUTO: 0.2 %
BLD PROD TYP BPU: NORMAL
BLD UNIT ID BPU: 0
BLOOD PRODUCT CODE: NORMAL
BPU ID: NORMAL
BUN SERPL-MCNC: 59 MG/DL (ref 7–30)
CALCIUM SERPL-MCNC: 8.1 MG/DL (ref 8.5–10.1)
CHLORIDE SERPL-SCNC: 113 MMOL/L (ref 94–109)
CO2 SERPL-SCNC: 23 MMOL/L (ref 20–32)
CREAT SERPL-MCNC: 4.37 MG/DL (ref 0.66–1.25)
DIFFERENTIAL METHOD BLD: ABNORMAL
EOSINOPHIL # BLD AUTO: 0.2 10E9/L (ref 0–0.7)
EOSINOPHIL NFR BLD AUTO: 1 %
ERYTHROCYTE [DISTWIDTH] IN BLOOD BY AUTOMATED COUNT: 16 % (ref 10–15)
GFR SERPL CREATININE-BSD FRML MDRD: 13 ML/MIN/1.7M2
GLUCOSE SERPL-MCNC: 145 MG/DL (ref 70–99)
HCT VFR BLD AUTO: 19.6 % (ref 40–53)
HGB BLD-MCNC: 6.3 G/DL (ref 13.3–17.7)
HGB BLD-MCNC: 6.5 G/DL (ref 13.3–17.7)
IMM GRANULOCYTES # BLD: 0.1 10E9/L (ref 0–0.4)
IMM GRANULOCYTES NFR BLD: 0.5 %
INR PPP: 1.15 (ref 0.86–1.14)
INTERPRETATION ECG - MUSE: NORMAL
LACTATE BLD-SCNC: 1 MMOL/L (ref 0.7–2)
LACTATE BLD-SCNC: 2.9 MMOL/L (ref 0.7–2)
LYMPHOCYTES # BLD AUTO: 0.7 10E9/L (ref 0.8–5.3)
LYMPHOCYTES NFR BLD AUTO: 4.8 %
MCH RBC QN AUTO: 28.9 PG (ref 26.5–33)
MCHC RBC AUTO-ENTMCNC: 32.1 G/DL (ref 31.5–36.5)
MCV RBC AUTO: 90 FL (ref 78–100)
MONOCYTES # BLD AUTO: 1.1 10E9/L (ref 0–1.3)
MONOCYTES NFR BLD AUTO: 6.9 %
NEUTROPHILS # BLD AUTO: 13.3 10E9/L (ref 1.6–8.3)
NEUTROPHILS NFR BLD AUTO: 86.6 %
NRBC # BLD AUTO: 0 10*3/UL
NRBC BLD AUTO-RTO: 0 /100
NT-PROBNP SERPL-MCNC: ABNORMAL PG/ML (ref 0–1800)
PLATELET # BLD AUTO: 268 10E9/L (ref 150–450)
POTASSIUM SERPL-SCNC: 4.6 MMOL/L (ref 3.4–5.3)
PROCALCITONIN SERPL-MCNC: 0.12 NG/ML
RBC # BLD AUTO: 2.18 10E12/L (ref 4.4–5.9)
SODIUM SERPL-SCNC: 145 MMOL/L (ref 133–144)
TRANSFUSION STATUS PATIENT QL: NORMAL
TRANSFUSION STATUS PATIENT QL: NORMAL
TROPONIN I SERPL-MCNC: 0.06 UG/L (ref 0–0.04)
TROPONIN I SERPL-MCNC: 0.07 UG/L (ref 0–0.04)
TROPONIN I SERPL-MCNC: 0.07 UG/L (ref 0–0.04)
WBC # BLD AUTO: 15.3 10E9/L (ref 4–11)

## 2018-05-27 PROCEDURE — 25000125 ZZHC RX 250: Performed by: EMERGENCY MEDICINE

## 2018-05-27 PROCEDURE — 96361 HYDRATE IV INFUSION ADD-ON: CPT

## 2018-05-27 PROCEDURE — 96365 THER/PROPH/DIAG IV INF INIT: CPT

## 2018-05-27 PROCEDURE — 40000275 ZZH STATISTIC RCP TIME EA 10 MIN

## 2018-05-27 PROCEDURE — 83605 ASSAY OF LACTIC ACID: CPT | Performed by: EMERGENCY MEDICINE

## 2018-05-27 PROCEDURE — 96375 TX/PRO/DX INJ NEW DRUG ADDON: CPT

## 2018-05-27 PROCEDURE — 84484 ASSAY OF TROPONIN QUANT: CPT | Performed by: EMERGENCY MEDICINE

## 2018-05-27 PROCEDURE — 86901 BLOOD TYPING SEROLOGIC RH(D): CPT | Performed by: EMERGENCY MEDICINE

## 2018-05-27 PROCEDURE — 71046 X-RAY EXAM CHEST 2 VIEWS: CPT

## 2018-05-27 PROCEDURE — 83605 ASSAY OF LACTIC ACID: CPT | Performed by: HOSPITALIST

## 2018-05-27 PROCEDURE — 94640 AIRWAY INHALATION TREATMENT: CPT

## 2018-05-27 PROCEDURE — P9016 RBC LEUKOCYTES REDUCED: HCPCS | Performed by: EMERGENCY MEDICINE

## 2018-05-27 PROCEDURE — 86900 BLOOD TYPING SEROLOGIC ABO: CPT | Performed by: EMERGENCY MEDICINE

## 2018-05-27 PROCEDURE — 25000128 H RX IP 250 OP 636: Performed by: EMERGENCY MEDICINE

## 2018-05-27 PROCEDURE — 86923 COMPATIBILITY TEST ELECTRIC: CPT | Performed by: EMERGENCY MEDICINE

## 2018-05-27 PROCEDURE — 25000128 H RX IP 250 OP 636: Performed by: NURSE PRACTITIONER

## 2018-05-27 PROCEDURE — 93005 ELECTROCARDIOGRAM TRACING: CPT

## 2018-05-27 PROCEDURE — 36415 COLL VENOUS BLD VENIPUNCTURE: CPT | Performed by: HOSPITALIST

## 2018-05-27 PROCEDURE — 12000000 ZZH R&B MED SURG/OB

## 2018-05-27 PROCEDURE — 80048 BASIC METABOLIC PNL TOTAL CA: CPT | Performed by: EMERGENCY MEDICINE

## 2018-05-27 PROCEDURE — 25000132 ZZH RX MED GY IP 250 OP 250 PS 637: Mod: GY | Performed by: HOSPITALIST

## 2018-05-27 PROCEDURE — 99223 1ST HOSP IP/OBS HIGH 75: CPT | Mod: AI | Performed by: HOSPITALIST

## 2018-05-27 PROCEDURE — 94660 CPAP INITIATION&MGMT: CPT

## 2018-05-27 PROCEDURE — 25000125 ZZHC RX 250: Performed by: HOSPITALIST

## 2018-05-27 PROCEDURE — 25000128 H RX IP 250 OP 636: Performed by: HOSPITALIST

## 2018-05-27 PROCEDURE — 85025 COMPLETE CBC W/AUTO DIFF WBC: CPT | Performed by: EMERGENCY MEDICINE

## 2018-05-27 PROCEDURE — A9270 NON-COVERED ITEM OR SERVICE: HCPCS | Mod: GY | Performed by: HOSPITALIST

## 2018-05-27 PROCEDURE — 84484 ASSAY OF TROPONIN QUANT: CPT | Performed by: HOSPITALIST

## 2018-05-27 PROCEDURE — 86850 RBC ANTIBODY SCREEN: CPT | Performed by: EMERGENCY MEDICINE

## 2018-05-27 PROCEDURE — 85610 PROTHROMBIN TIME: CPT | Performed by: EMERGENCY MEDICINE

## 2018-05-27 PROCEDURE — 84145 PROCALCITONIN (PCT): CPT | Performed by: HOSPITALIST

## 2018-05-27 PROCEDURE — 83880 ASSAY OF NATRIURETIC PEPTIDE: CPT | Performed by: EMERGENCY MEDICINE

## 2018-05-27 PROCEDURE — 87040 BLOOD CULTURE FOR BACTERIA: CPT | Performed by: EMERGENCY MEDICINE

## 2018-05-27 PROCEDURE — 99207 ZZC APP CREDIT; MD BILLING SHARED VISIT: CPT | Performed by: NURSE PRACTITIONER

## 2018-05-27 PROCEDURE — 85018 HEMOGLOBIN: CPT | Performed by: HOSPITALIST

## 2018-05-27 PROCEDURE — 99285 EMERGENCY DEPT VISIT HI MDM: CPT | Mod: 25

## 2018-05-27 RX ORDER — MEMANTINE HYDROCHLORIDE 10 MG/1
10 TABLET ORAL 2 TIMES DAILY
Status: DISCONTINUED | OUTPATIENT
Start: 2018-05-27 | End: 2018-05-30

## 2018-05-27 RX ORDER — ACETAMINOPHEN 650 MG/1
650 SUPPOSITORY RECTAL EVERY 4 HOURS PRN
Status: DISCONTINUED | OUTPATIENT
Start: 2018-05-27 | End: 2018-06-01 | Stop reason: HOSPADM

## 2018-05-27 RX ORDER — ASPIRIN 81 MG/1
81 TABLET, CHEWABLE ORAL DAILY
Status: ON HOLD | COMMUNITY
End: 2018-05-30

## 2018-05-27 RX ORDER — ONDANSETRON 2 MG/ML
4 INJECTION INTRAMUSCULAR; INTRAVENOUS EVERY 6 HOURS PRN
Status: DISCONTINUED | OUTPATIENT
Start: 2018-05-27 | End: 2018-06-01 | Stop reason: HOSPADM

## 2018-05-27 RX ORDER — HYDRALAZINE HYDROCHLORIDE 50 MG/1
50 TABLET, FILM COATED ORAL 3 TIMES DAILY
Status: DISCONTINUED | OUTPATIENT
Start: 2018-05-27 | End: 2018-05-29

## 2018-05-27 RX ORDER — ONDANSETRON 4 MG/1
4 TABLET, ORALLY DISINTEGRATING ORAL EVERY 6 HOURS PRN
Status: DISCONTINUED | OUTPATIENT
Start: 2018-05-27 | End: 2018-06-01 | Stop reason: HOSPADM

## 2018-05-27 RX ORDER — DONEPEZIL HYDROCHLORIDE 10 MG/1
10 TABLET, FILM COATED ORAL AT BEDTIME
Status: DISCONTINUED | OUTPATIENT
Start: 2018-05-27 | End: 2018-05-30

## 2018-05-27 RX ORDER — AMOXICILLIN 250 MG
1 CAPSULE ORAL 2 TIMES DAILY PRN
Status: DISCONTINUED | OUTPATIENT
Start: 2018-05-27 | End: 2018-06-01 | Stop reason: HOSPADM

## 2018-05-27 RX ORDER — ACETAMINOPHEN 325 MG/1
650 TABLET ORAL EVERY 4 HOURS PRN
Status: DISCONTINUED | OUTPATIENT
Start: 2018-05-27 | End: 2018-06-01 | Stop reason: HOSPADM

## 2018-05-27 RX ORDER — MORPHINE SULFATE 4 MG/ML
INJECTION, SOLUTION INTRAMUSCULAR; INTRAVENOUS
Status: DISCONTINUED
Start: 2018-05-27 | End: 2018-05-28 | Stop reason: HOSPADM

## 2018-05-27 RX ORDER — AMLODIPINE BESYLATE 10 MG/1
10 TABLET ORAL DAILY
Status: DISCONTINUED | OUTPATIENT
Start: 2018-05-28 | End: 2018-05-29

## 2018-05-27 RX ORDER — LEVETIRACETAM 250 MG/1
250 TABLET ORAL AT BEDTIME
Status: DISCONTINUED | OUTPATIENT
Start: 2018-05-27 | End: 2018-06-01 | Stop reason: HOSPADM

## 2018-05-27 RX ORDER — MORPHINE SULFATE 4 MG/ML
2 INJECTION, SOLUTION INTRAMUSCULAR; INTRAVENOUS ONCE
Status: COMPLETED | OUTPATIENT
Start: 2018-05-27 | End: 2018-05-27

## 2018-05-27 RX ORDER — AMOXICILLIN 250 MG
2 CAPSULE ORAL 2 TIMES DAILY PRN
Status: DISCONTINUED | OUTPATIENT
Start: 2018-05-27 | End: 2018-06-01 | Stop reason: HOSPADM

## 2018-05-27 RX ORDER — LIDOCAINE 40 MG/G
CREAM TOPICAL
Status: DISCONTINUED | OUTPATIENT
Start: 2018-05-27 | End: 2018-06-01 | Stop reason: HOSPADM

## 2018-05-27 RX ORDER — POLYETHYLENE GLYCOL 3350 17 G/17G
17 POWDER, FOR SOLUTION ORAL DAILY PRN
Status: DISCONTINUED | OUTPATIENT
Start: 2018-05-27 | End: 2018-06-01 | Stop reason: HOSPADM

## 2018-05-27 RX ORDER — FINASTERIDE 5 MG/1
5 TABLET, FILM COATED ORAL DAILY
Status: DISCONTINUED | OUTPATIENT
Start: 2018-05-28 | End: 2018-05-30

## 2018-05-27 RX ORDER — LANOLIN ALCOHOL/MO/W.PET/CERES
400 CREAM (GRAM) TOPICAL DAILY
Status: DISCONTINUED | OUTPATIENT
Start: 2018-05-28 | End: 2018-05-28

## 2018-05-27 RX ORDER — BISACODYL 10 MG
10 SUPPOSITORY, RECTAL RECTAL DAILY PRN
Status: DISCONTINUED | OUTPATIENT
Start: 2018-05-27 | End: 2018-06-01 | Stop reason: HOSPADM

## 2018-05-27 RX ORDER — ALBUTEROL SULFATE 0.83 MG/ML
3 SOLUTION RESPIRATORY (INHALATION)
Status: DISCONTINUED | OUTPATIENT
Start: 2018-05-27 | End: 2018-06-01 | Stop reason: HOSPADM

## 2018-05-27 RX ORDER — PROCHLORPERAZINE 25 MG
12.5 SUPPOSITORY, RECTAL RECTAL EVERY 12 HOURS PRN
Status: DISCONTINUED | OUTPATIENT
Start: 2018-05-27 | End: 2018-06-01 | Stop reason: HOSPADM

## 2018-05-27 RX ORDER — PIPERACILLIN SODIUM, TAZOBACTAM SODIUM 3; .375 G/15ML; G/15ML
3.38 INJECTION, POWDER, LYOPHILIZED, FOR SOLUTION INTRAVENOUS ONCE
Status: COMPLETED | OUTPATIENT
Start: 2018-05-27 | End: 2018-05-27

## 2018-05-27 RX ORDER — NALOXONE HYDROCHLORIDE 0.4 MG/ML
.1-.4 INJECTION, SOLUTION INTRAMUSCULAR; INTRAVENOUS; SUBCUTANEOUS
Status: DISCONTINUED | OUTPATIENT
Start: 2018-05-27 | End: 2018-05-29

## 2018-05-27 RX ORDER — FUROSEMIDE 10 MG/ML
INJECTION INTRAMUSCULAR; INTRAVENOUS
Status: DISCONTINUED
Start: 2018-05-27 | End: 2018-05-28 | Stop reason: HOSPADM

## 2018-05-27 RX ORDER — PANTOPRAZOLE SODIUM 40 MG/1
40 TABLET, DELAYED RELEASE ORAL EVERY MORNING
Status: DISCONTINUED | OUTPATIENT
Start: 2018-05-28 | End: 2018-05-28

## 2018-05-27 RX ORDER — FUROSEMIDE 10 MG/ML
40 INJECTION INTRAMUSCULAR; INTRAVENOUS ONCE
Status: COMPLETED | OUTPATIENT
Start: 2018-05-27 | End: 2018-05-27

## 2018-05-27 RX ORDER — PIPERACILLIN SODIUM, TAZOBACTAM SODIUM 2; .25 G/10ML; G/10ML
2.25 INJECTION, POWDER, LYOPHILIZED, FOR SOLUTION INTRAVENOUS EVERY 6 HOURS
Status: DISCONTINUED | OUTPATIENT
Start: 2018-05-27 | End: 2018-05-29

## 2018-05-27 RX ORDER — ASPIRIN 81 MG/1
81 TABLET, CHEWABLE ORAL DAILY
Status: DISCONTINUED | OUTPATIENT
Start: 2018-05-28 | End: 2018-05-29

## 2018-05-27 RX ORDER — PROCHLORPERAZINE MALEATE 5 MG
5 TABLET ORAL EVERY 6 HOURS PRN
Status: DISCONTINUED | OUTPATIENT
Start: 2018-05-27 | End: 2018-06-01 | Stop reason: HOSPADM

## 2018-05-27 RX ADMIN — PANTOPRAZOLE SODIUM 40 MG: 40 INJECTION, POWDER, FOR SOLUTION INTRAVENOUS at 16:11

## 2018-05-27 RX ADMIN — FUROSEMIDE 40 MG: 10 INJECTION, SOLUTION INTRAMUSCULAR; INTRAVENOUS at 20:10

## 2018-05-27 RX ADMIN — PIPERACILLIN SODIUM,TAZOBACTAM SODIUM 2.25 G: 2; .25 INJECTION, POWDER, FOR SOLUTION INTRAVENOUS at 22:57

## 2018-05-27 RX ADMIN — Medication 12.5 MG: at 19:26

## 2018-05-27 RX ADMIN — ALBUTEROL SULFATE 2.5 MG: 2.5 SOLUTION RESPIRATORY (INHALATION) at 19:43

## 2018-05-27 RX ADMIN — HYDRALAZINE HYDROCHLORIDE 50 MG: 50 TABLET ORAL at 19:28

## 2018-05-27 RX ADMIN — MORPHINE SULFATE 2 MG: 4 INJECTION INTRAVENOUS at 20:45

## 2018-05-27 RX ADMIN — SODIUM CHLORIDE 1000 ML: 9 INJECTION, SOLUTION INTRAVENOUS at 15:48

## 2018-05-27 RX ADMIN — PIPERACILLIN SODIUM,TAZOBACTAM SODIUM 3.38 G: 3; .375 INJECTION, POWDER, FOR SOLUTION INTRAVENOUS at 17:04

## 2018-05-27 ASSESSMENT — ENCOUNTER SYMPTOMS
FEVER: 1
BLOOD IN STOOL: 0
ABDOMINAL PAIN: 0
SHORTNESS OF BREATH: 1
COUGH: 0

## 2018-05-27 NOTE — IP AVS SNAPSHOT
MRN:[id number]                      After Visit Summary   5/27/2018    Yuriy Ghosh    MRN: [id number]           Thank you!     Thank you for choosing [facility] for your care. Our goal is always to provide you with excellent care. Hearing back from our patients is one way we can continue to improve our services. Please take a few minutes to complete the written survey that you may receive in the mail after you visit with us. Thank you!        Patient Information     Date Of Birth          5/4/1926        Designated Caregiver       Most Recent Value    Caregiver    Will someone help with your care after discharge? no      About your hospital stay     You were admitted on:  May 27, 2018 You last received care in the:  [place] [address] [facility]    You were discharged on:  June 1, 2018        Reason for your hospital stay       Acute respiratory failure due to Acute CHF/HCAP, and acute anemia, suspected GI bleed.                  Who to Call     For medical emergencies, please call 911.  For non-urgent questions about your medical care, please call your primary care provider or clinic, [phone]          Attending Provider     Provider Specialty    Debora Galvez MD Emergency Medicine    [facility]       Primary Care Provider Office Phone # Fax #    Kal Guadalupe [phone]      After Care Instructions     Activity       Your activity upon discharge: activity as tolerated            Diet       Follow this diet upon discharge: Orders Placed This Encounter      Room Service      Regular Diet Adult            General info for SNF       Length of Stay Estimate: Short Term Care: Estimated # of Days <30  Condition at Discharge: Declining  Level of care:board and care  Rehabilitation Potential: Poor  Admission H&P remains valid and up-to-date: Yes  Recent Chemotherapy: N/A  Use Nursing Home Standing Orders: Yes            Mantoux instructions        "Give two-step Mantoux (PPD) Per Facility Policy Yes            Oxygen - Nasal cannula       1-2 Lpm by nasal cannula for comfort.                  Follow-up Appointments     Follow-up and recommended labs and tests        Hospice.                  Further instructions from your care team       [facility] & Hospice providers: Patient's significant other will be continuing to tour facilities in the hopes of transitioning to a different facility.  Please be supportive of this and help her navigate the decision-making given different facilities in the area.      Pending Results     Date and Time Order Name Status Description    5/27/2018 1452 Blood culture Preliminary     5/27/2018 1452 Blood culture Preliminary             Statement of Approval     Ordered          06/01/18 1438  I have reviewed and agree with all the recommendations and orders detailed in this document.  EFFECTIVE NOW     Approved and electronically signed by:  Coral Mitchell MD             Admission Information     Date & Time Provider Department Dept. Phone    5/27/2018 Kike Knight MD [place] [address] [facility] [phone]      Your Vitals Were     Blood Pressure Pulse Temperature Respirations Weight Pulse Oximetry    132/94 82 100.3  F (37.9  C) 22 65.6 kg (144 lb 10 oz) 97%    BMI (Body Mass Index)                   22.65 kg/m2           MyChart Information     [employer] lets you send messages to your doctor, view your test results, renew your prescriptions, schedule appointments and more. To sign up, go to [email or web address] . Click on \"Log in\" on the left side of the screen, which will take you to the Welcome page. Then click on \"Sign up Now\" on the right side of the page.     You will be asked to enter the access code listed below, as well as some personal information. Please follow the directions to create your username and password.     Your access code is: [email or web address]  Expires: 8/2/2018 10:00 AM     Your access code will "  in 90 days. If you need help or a new code, please call your [facility] clinic or [phone].        Care EveryWhere ID     This is your Care EveryWhere ID. This could be used by other organizations to access your [facility] medical records  [id number]        Equal Access to Services     RYAN STATON : Hadii aad ku hadjimenezkay Soomaali, waaxda luqadaha, qaybta kaalmada adeegyada, boogie aikenrenettajoaquin nunez. So [facility] [place] [phone].    ATENCIÓN: Si habla español, tiene a tidwell disposición servicios gratuitos de asistencia lingüística. Llame al [phone].    We comply with applicable federal civil rights laws and [place] laws. We do not discriminate on the basis of race, color, national origin, age, disability, sex, sexual orientation, or gender identity.               Review of your medicines      START taking        Dose / Directions    * acetaminophen 650 MG Suppository   Commonly known as:  TYLENOL   Used for:  End of life care   Replaces:  acetaminophen 325 MG tablet        Dose:  650 mg   Place 1 suppository (650 mg) rectally every 4 hours as needed for mild pain   Quantity:  60 suppository   Refills:  0       * acetaminophen 650 MG Suppository   Commonly known as:  TYLENOL   Used for:  End of life care        Dose:  650 mg   Place 1 suppository (650 mg) rectally every 4 hours as needed for fever or mild pain   Quantity:  60 suppository   Refills:  0       atropine 1 % ophthalmic solution   Used for:  End of life care        Dose:  1-2 drop   Place 1-2 drops under the tongue every hour as needed for other (secretions)   Quantity:  1 Bottle   Refills:  0       bisacodyl 10 MG Suppository   Commonly known as:  DULCOLAX   Used for:  End of life care        Dose:  10 mg   Place 1 suppository (10 mg) rectally daily as needed for constipation   Quantity:  30 suppository   Refills:  0       furosemide 40 MG tablet   Commonly known as:  LASIX   Used for:  End of life care        Dose:  40 mg   Take 1  tablet (40 mg) by mouth daily   Quantity:  30 tablet   Refills:  0       haloperidol 2 MG/ML (HIGH CONC) solution   Commonly known as:  HALDOL   Used for:  End of life care        Dose:  2 mg   Take 1 mL (2 mg) by mouth every 6 hours as needed for agitation or other (hallucinations, restlessness, delirium, nausea)   Quantity:  60 mL   Refills:  0       HYDROmorphone 4 mg/0.4 mL (HIGH CONC) oral solution   Commonly known as:  DILAUDID   Used for:  End of life care        Dose:  2-4 mg   Place 0.2-0.4 mLs (2-4 mg) under the tongue every 2 hours as needed for moderate to severe pain or other (SOB, dyspnea, distress)   Quantity:  30 mL   Refills:  0       LORazepam 2 MG/ML (HIGH CONC) solution   Commonly known as:  LORazepam INTENSOL   Used for:  End of life care        Dose:  1 mg   Take 0.5 mLs (1 mg) by mouth every 3 hours as needed   Quantity:  15 mL   Refills:  0       ondansetron 4 MG ODT tab   Commonly known as:  ZOFRAN-ODT   Used for:  End of life care        Dose:  4 mg   Take 1 tablet (4 mg) by mouth every 6 hours as needed for nausea or vomiting   Quantity:  120 tablet   Refills:  0       senna-docusate 8.6-50 MG per tablet   Commonly known as:  SENOKOT-S;PERICOLACE   Used for:  End of life care        Dose:  1 tablet   Take 1 tablet by mouth 2 times daily as needed for constipation   Quantity:  100 tablet   Refills:  0       * Notice:  This list has 2 medication(s) that are the same as other medications prescribed for you. Read the directions carefully, and ask your doctor or other care provider to review them with you.      CONTINUE these medicines which have NOT CHANGED        Dose / Directions    KEPPRA PO        Dose:  250 mg   Take 250 mg by mouth At Bedtime   Refills:  0         STOP taking     acetaminophen 325 MG tablet   Commonly known as:  TYLENOL   Replaced by:  acetaminophen 650 MG Suppository           amLODIPine 10 MG tablet   Commonly known as:  NORVASC           aspirin 81 MG chewable tablet            calcium citrate-vitamin D 315-250 MG-UNIT Tabs per tablet   Commonly known as:  CITRACAL           CYANOCOBALAMIN PO           DONEPEZIL HCL PO           finasteride 5 MG tablet   Commonly known as:  PROSCAR           FOLIC ACID PO           hydrALAZINE 50 MG tablet   Commonly known as:  APRESOLINE           LEVOTHYROXINE SODIUM PO           MEMANTINE HCL PO           pantoprazole 40 MG EC tablet   Commonly known as:  PROTONIX           VITAMIN D (CHOLECALCIFEROL) PO                Where to get your medicines      These medications were sent to [facility] [place], [place] - [address]  [address], [place] [address]     Phone:  [phone]     acetaminophen 650 MG Suppository    acetaminophen 650 MG Suppository    atropine 1 % ophthalmic solution    bisacodyl 10 MG Suppository    furosemide 40 MG tablet    haloperidol 2 MG/ML (HIGH CONC) solution    ondansetron 4 MG ODT tab    senna-docusate 8.6-50 MG per tablet         Some of these will need a paper prescription and others can be bought over the counter. Ask your nurse if you have questions.     Bring a paper prescription for each of these medications     HYDROmorphone 4 mg/0.4 mL (HIGH CONC) oral solution    LORazepam 2 MG/ML (HIGH CONC) solution                Protect others around you: Learn how to safely use, store and throw away your medicines at www.disposemymeds.org.        Information about OPIOIDS     PRESCRIPTION OPIOIDS: WHAT YOU NEED TO KNOW   You have a prescription for an opioid (narcotic) pain medicine. Opioids can cause addiction. If you have a history of chemical dependency of any type, you are at a higher risk of becoming addicted to opioids. Only take this medicine after all other options have been tried. Take it for as short a time and as few doses as possible.     Do not:    Drive. If you drive while taking these medicines, you could be arrested for driving under the influence (DUI).    Operate heavy  machinery    Do any other dangerous activities while taking these medicines.     Drink any alcohol while taking these medicines.      Take with any other medicines that contain acetaminophen. Read all labels carefully. Look for the word  acetaminophen  or  Tylenol.  Ask your pharmacist if you have questions or are unsure.    Store your pills in a secure place, locked if possible. We will not replace any lost or stolen medicine. If you don t finish your medicine, please throw away (dispose) as directed by your pharmacist. The [place] Pollution Control Agency has more information about safe disposal: https://www.pca.Replaced by [facility].[place].us/living-green/managing-unwanted-medications    All opioids tend to cause constipation. Drink plenty of water and eat foods that have a lot of fiber, such as fruits, vegetables, prune juice, apple juice and high-fiber cereal. Take a laxative (Miralax, milk of magnesia, Colace, Senna) if you don t move your bowels at least every other day.              Medication List: This is a list of all your medications and when to take them. Check marks below indicate your daily home schedule. Keep this list as a reference.      Medications           Morning Afternoon Evening Bedtime As Needed    * acetaminophen 650 MG Suppository   Commonly known as:  TYLENOL   Place 1 suppository (650 mg) rectally every 4 hours as needed for mild pain                                * acetaminophen 650 MG Suppository   Commonly known as:  TYLENOL   Place 1 suppository (650 mg) rectally every 4 hours as needed for fever or mild pain                                atropine 1 % ophthalmic solution   Place 1-2 drops under the tongue every hour as needed for other (secretions)                                bisacodyl 10 MG Suppository   Commonly known as:  DULCOLAX   Place 1 suppository (10 mg) rectally daily as needed for constipation                                furosemide 40 MG tablet   Commonly known as:  LASIX   Take 1  tablet (40 mg) by mouth daily   Last time this was given:  40 mg on 6/1/2018  8:35 AM                                haloperidol 2 MG/ML (HIGH CONC) solution   Commonly known as:  HALDOL   Take 1 mL (2 mg) by mouth every 6 hours as needed for agitation or other (hallucinations, restlessness, delirium, nausea)                                HYDROmorphone 4 mg/0.4 mL (HIGH CONC) oral solution   Commonly known as:  DILAUDID   Place 0.2-0.4 mLs (2-4 mg) under the tongue every 2 hours as needed for moderate to severe pain or other (SOB, dyspnea, distress)   Last time this was given:  2 mg on 5/31/2018 11:54 PM                                KEPPRA PO   Take 250 mg by mouth At Bedtime   Last time this was given:  250 mg on 5/31/2018  9:23 PM                                LORazepam 2 MG/ML (HIGH CONC) solution   Commonly known as:  LORazepam INTENSOL   Take 0.5 mLs (1 mg) by mouth every 3 hours as needed   Last time this was given:  1 mg on 5/31/2018  9:23 PM                                ondansetron 4 MG ODT tab   Commonly known as:  ZOFRAN-ODT   Take 1 tablet (4 mg) by mouth every 6 hours as needed for nausea or vomiting                                senna-docusate 8.6-50 MG per tablet   Commonly known as:  SENOKOT-S;PERICOLACE   Take 1 tablet by mouth 2 times daily as needed for constipation                                * Notice:  This list has 2 medication(s) that are the same as other medications prescribed for you. Read the directions carefully, and ask your doctor or other care provider to review them with you.

## 2018-05-27 NOTE — ED NOTES
DATE:  5/27/2018   TIME OF RECEIPT FROM LAB:  1520  LAB TEST:  hgb  LAB VALUE:  6.3  RESULTS GIVEN WITH READ-BACK TO (PROVIDER):  Debora Galvez MD  TIME LAB VALUE REPORTED TO PROVIDER:   3:21 PM

## 2018-05-27 NOTE — IP AVS SNAPSHOT
"[place] [address] ONCOLOGY: [phone]                                              INTERAGENCY TRANSFER FORM - PHYSICIAN ORDERS   2018                    Hospital Admission Date: 2018  SHY BELLA   : 1926  Sex: Male        Attending Provider: Kike Knight MD     Allergies:  No Known Allergies    Infection:  None   Service:  HOSPITALIST    Ht:  1.702 m (5' 7\")   Wt:  65.6 kg (144 lb 10 oz)   Admission Wt:  69.5 kg (153 lb 3.5 oz)    BMI:  22.65 kg/m 2   BSA:  1.76 m 2            Patient PCP Information     Provider PCP Type    Kal Guadalupe MD General      ED Clinical Impression     Diagnosis Description Comment Added By Time Added    Hospital-acquired pneumonia [J18.9] Hospital-acquired pneumonia [J18.9]  Debora Galvez MD 2018  4:53 PM    Anemia due to blood loss, acute [D62] Anemia due to blood loss, acute [D62]  Debora Galvez MD 2018  4:54 PM    Acute systolic congestive heart failure (H) [I50.21] Acute systolic congestive heart failure (H) [I50.21]  Debora Galvez MD 2018  4:54 PM    Acute renal injury (H) [N17.9] Acute renal injury (H) [N17.9]  Debora Galvez MD 2018  4:54 PM      Hospital Problems as of 2018              Priority Class Noted POA    Acute respiratory failure with hypoxia (H) Medium  2018 Yes      Non-Hospital Problems as of 2018              Priority Class Noted    Fall Medium  2018    Acute renal failure (ARF) (H) Medium  5/3/2018    ACP (advance care planning) Medium  2018      Code Status History     Date Active Date Inactive Code Status Order ID Comments User Context    2018  3:41 PM  DNR/DNI [id number]  Coral Mitchell MD Outpatient    2018 12:35 PM 2018  3:41 PM DNR/DNI [id number]  Coral Mitchell MD Outpatient    2018  5:44 PM 2018 12:35 PM DNR/DNI [id number]  Kike Knight MD [facility]    2018  5:45 PM 2018  5:44 PM PAYAM/ADITHYA [id number]  Kike Knight MD " Outpatient    5/1/2018  8:07 PM 5/11/2018  5:45 PM DNR/DNI [id number]  Shakir Erickson MD Inpatient         Medication Review      START taking        Dose / Directions Comments    * acetaminophen 650 MG Suppository   Commonly known as:  TYLENOL   Used for:  End of life care   Replaces:  acetaminophen 325 MG tablet        Dose:  650 mg   Place 1 suppository (650 mg) rectally every 4 hours as needed for mild pain   Quantity:  60 suppository   Refills:  0        * acetaminophen 650 MG Suppository   Commonly known as:  TYLENOL   Used for:  End of life care        Dose:  650 mg   Place 1 suppository (650 mg) rectally every 4 hours as needed for fever or mild pain   Quantity:  60 suppository   Refills:  0        atropine 1 % ophthalmic solution   Used for:  End of life care        Dose:  1-2 drop   Place 1-2 drops under the tongue every hour as needed for other (secretions)   Quantity:  1 Bottle   Refills:  0        bisacodyl 10 MG Suppository   Commonly known as:  DULCOLAX   Used for:  End of life care        Dose:  10 mg   Place 1 suppository (10 mg) rectally daily as needed for constipation   Quantity:  30 suppository   Refills:  0        furosemide 40 MG tablet   Commonly known as:  LASIX   Used for:  End of life care        Dose:  40 mg   Take 1 tablet (40 mg) by mouth daily   Quantity:  30 tablet   Refills:  0        haloperidol 2 MG/ML (HIGH CONC) solution   Commonly known as:  HALDOL   Used for:  End of life care        Dose:  2 mg   Take 1 mL (2 mg) by mouth every 6 hours as needed for agitation or other (hallucinations, restlessness, delirium, nausea)   Quantity:  60 mL   Refills:  0        HYDROmorphone 4 mg/0.4 mL (HIGH CONC) oral solution   Commonly known as:  DILAUDID   Used for:  End of life care        Dose:  2-4 mg   Place 0.2-0.4 mLs (2-4 mg) under the tongue every 2 hours as needed for moderate to severe pain or other (SOB, dyspnea, distress)   Quantity:  30 mL   Refills:  0        LORazepam 2  MG/ML (HIGH CONC) solution   Commonly known as:  LORazepam INTENSOL   Used for:  End of life care        Dose:  1 mg   Take 0.5 mLs (1 mg) by mouth every 3 hours as needed   Quantity:  15 mL   Refills:  0        ondansetron 4 MG ODT tab   Commonly known as:  ZOFRAN-ODT   Used for:  End of life care        Dose:  4 mg   Take 1 tablet (4 mg) by mouth every 6 hours as needed for nausea or vomiting   Quantity:  120 tablet   Refills:  0        senna-docusate 8.6-50 MG per tablet   Commonly known as:  SENOKOT-S;PERICOLACE   Used for:  End of life care        Dose:  1 tablet   Take 1 tablet by mouth 2 times daily as needed for constipation   Quantity:  100 tablet   Refills:  0        * Notice:  This list has 2 medication(s) that are the same as other medications prescribed for you. Read the directions carefully, and ask your doctor or other care provider to review them with you.      CONTINUE these medications which have NOT CHANGED        Dose / Directions Comments    KEPPRA PO        Dose:  250 mg   Take 250 mg by mouth At Bedtime   Refills:  0          STOP taking     acetaminophen 325 MG tablet   Commonly known as:  TYLENOL   Replaced by:  acetaminophen 650 MG Suppository           amLODIPine 10 MG tablet   Commonly known as:  NORVASC           aspirin 81 MG chewable tablet           calcium citrate-vitamin D 315-250 MG-UNIT Tabs per tablet   Commonly known as:  CITRACAL           CYANOCOBALAMIN PO           DONEPEZIL HCL PO           finasteride 5 MG tablet   Commonly known as:  PROSCAR           FOLIC ACID PO           hydrALAZINE 50 MG tablet   Commonly known as:  APRESOLINE           LEVOTHYROXINE SODIUM PO           MEMANTINE HCL PO           pantoprazole 40 MG EC tablet   Commonly known as:  PROTONIX           VITAMIN D (CHOLECALCIFEROL) PO                     Further instructions from your care team       Luis  & Hospice providers: Patient's significant other will be continuing to tour facilities in the  hopes of transitioning to a different facility.  Please be supportive of this and help her navigate the decision-making given different facilities in the area.      Summary of Visit     Reason for your hospital stay       Acute respiratory failure due to Acute CHF/HCAP, and acute anemia, suspected GI bleed.             After Care     Activity       Your activity upon discharge: activity as tolerated       Diet       Follow this diet upon discharge: Orders Placed This Encounter      Room Service      Regular Diet Adult       General info for SNF       Length of Stay Estimate: Short Term Care: Estimated # of Days <30  Condition at Discharge: Declining  Level of care:board and care  Rehabilitation Potential: Poor  Admission H&P remains valid and up-to-date: Yes  Recent Chemotherapy: N/A  Use Nursing Home Standing Orders: Yes       Mantoux instructions       Give two-step Mantoux (PPD) Per Facility Policy Yes             Procedures     Oxygen - Nasal cannula       1-2 Lpm by nasal cannula for comfort.             Follow-Up Appointment Instructions     Future Labs/Procedures    Follow-up and recommended labs and tests      Comments:    Hospice.      Follow-Up Appointment Instructions     Follow-up and recommended labs and tests        Hospice.             Statement of Approval     Ordered          06/01/18 1438  I have reviewed and agree with all the recommendations and orders detailed in this document.  EFFECTIVE NOW     Approved and electronically signed by:  Coral Mitchell MD

## 2018-05-27 NOTE — PHARMACY-ADMISSION MEDICATION HISTORY
Admission medication history interview status for the 5/27/2018  admission is complete. See EPIC admission navigator for prior to admission medications     Medication history source reliability:Good    Actions taken by pharmacist (provider contacted, etc): Reviewed medication list sent by [facility] -- called ([phone]) to verify last doses    Additional medication history information not noted on PTA med list :      Pt's Aspirin 81mg has been on hold as of 5/22 (per [facility])    Added: None    Removed: Quetiapine 25mg QHS and 12.mg TID PRN (discontinued as of 5/21 per [facility])    Modified: None    Medication reconciliation/reorder completed by provider prior to medication history? No    Time spent in this activity: 30 minutes    Prior to Admission medications    Medication Sig Last Dose Taking? Auth Provider   acetaminophen (TYLENOL) 325 MG tablet Take 2 tablets (650 mg) by mouth every 4 hours as needed for mild pain or fever PRN med Yes Kike Knight MD   amLODIPine (NORVASC) 10 MG tablet Take 1 tablet (10 mg) by mouth daily 5/27/2018 at AM Yes Kike nKight MD   calcium citrate-vitamin D (CITRACAL) 315-250 MG-UNIT TABS per tablet Take 1 tablet by mouth 2 times daily 5/27/2018 at AM Yes Unknown, Entered By History   CYANOCOBALAMIN PO Take 2,500 mcg by mouth daily 5/27/2018 at AM Yes Unknown, Entered By History   DONEPEZIL HCL PO Take 10 mg by mouth At Bedtime 5/26/2018 at PM Yes Unknown, Entered By History   finasteride (PROSCAR) 5 MG tablet Take 1 tablet (5 mg) by mouth daily 5/27/2018 at AM Yes Kike Knight MD   FOLIC ACID PO Take 400 mcg by mouth daily 5/27/2018 at AM Yes Unknown, Entered By History   hydrALAZINE (APRESOLINE) 50 MG tablet Take 1 tablet (50 mg) by mouth 3 times daily 5/27/2018 at AM Yes Tutu Scott MD   LevETIRAcetam (KEPPRA PO) Take 250 mg by mouth At Bedtime 5/26/2018 at PM Yes Unknown, Entered By History   LEVOTHYROXINE SODIUM PO Take 125 mcg  by mouth daily 5/27/2018 at AM Yes Unknown, Entered By History   MEMANTINE HCL PO Take 10 mg by mouth 2 times daily 5/27/2018 at AM Yes Unknown, Entered By History   pantoprazole (PROTONIX) 40 MG EC tablet Take 1 tablet (40 mg) by mouth every morning 5/27/2018 at AM Yes Tutu Scott MD   VITAMIN D, CHOLECALCIFEROL, PO Take 1,000 Units by mouth daily 5/27/2018 at AM Yes Unknown, Entered By History   aspirin 81 MG chewable tablet Take 81 mg by mouth daily on HOLD as of 5/22  Unknown, Entered By History

## 2018-05-27 NOTE — IP AVS SNAPSHOT
` `     [place] [address] ONCOLOGY: [phone]                 INTERAGENCY TRANSFER FORM - NOTES (H&P, Discharge Summary, Consults, Procedures, Therapies)   2018                    Hospital Admission Date: 2018  YURIY BELLA   : 1926  Sex: Male        Patient PCP Information     Provider PCP Type    Kal Guadalupe MD General         History & Physicals      H&P by Kike Knight MD at 2018  5:07 PM     Author:  Kike Knight MD Service:  Hospitalist Author Type:  Physician    Filed:  2018  6:47 PM Date of Service:  2018  5:07 PM Creation Time:  2018  5:07 PM    Status:  Signed :  Kike Knight MD (Physician)         [facility]    History and Physical  Hospitalist       Date of Admission:  2018    Assessment & Plan   Yuriy Bella is a 89 year old male with past history HTN, PAD, hypothyroid, seizure disorder, BPH, CKD, CHF, recent hospitalization complicated by GI bleed due to duodenal ulcer who presents with shortness of breath.    Acute hypoxic respiratory failure[PB1.1]  Hypoxia to 84% on room air upon arrival.[PB1.2]  Suspect multifactorial including possible pneumonia[PB1.1] (new leukocytosis with left shift, though afebrile, lactate of 2.9)[PB1.2] and acute on chronic CHF[PB1.1] (pro-BNP >48K with marked lower extremity edema)[PB1.2].[PB1.1]  CXR showing bilateral patchy opacities.[PB1.2]  - wean oxygen as able  - continue zosyn (holding on vanco due to concern for renal toxicity)  - check procalcitonin  - repeat lactic acid  - no diuretics at this time[PB1.1]; he is in a very difficult situation due to his poor renal function; may need to consider hospice discussion[PB1.2]    Acute on chronic systolic CHF[PB1.1]  Moderate AS  Pulmonary HTN  TTE 18 with EF 45-50% with global hypokinesia, 1-2+ MR, 1-2+ TR, moderate AS, pulmonary HTN.  Not on BB due to intolerance (bradycardia), no ACE/ARB due to renal function.  - trop  minimally elevated, suspect due to demand and will trend  - diurese as able  - continue aspirin[PB1.2]    CKD stage IV[PB1.1]  Evaluated by Nephrology during last admission, suspect progression of CKD due to HTN and vascular disease.  Admission Cr 4.3, which is largely stable from recent discharge.  - monitor BMP  - due for Nephrology follow up, will consult[PB1.2]    Hx of GIB with anemia[PB1.1]  Experienced GIB due to duodenal ulcer on 5/12/18.  Discharge hgb 7.8, admission hgb 6.3.  No known melena or hematochezia.[PB1.2]  - transfuse 1 unit PRBC, repeat hgb this evening[PB1.1] and tomorrow AM  - hold on GI consult ([facility]) unless signs of active bleeding    HTN  - continue PTA amlodipine and hydralazine[PB1.2]    Hypothyroid[PB1.1]  - continue PTA levothyroxine[PB1.2]    Seizure disorder[PB1.1]  - continue PTA Keppra[PB1.2]    Dementia[PB1.1]  - continue PTA donepezil and memantine  - prn seroquel for agitation    BPH  - continue PTA finasteride[PB1.2]      DVT Prophylaxis: Pneumatic Compression Devices  Code Status: DNR / DNI[PB1.1]; this was discussed with significant other[PB1.2]    # Pain Assessment:  Current Pain Score 5/27/2018   Patient currently in pain? denies   Pain score (0-10) -   Yuriy knapp pain level was assessed and he currently denies pain.      Disposition: Expected discharge in 3 days once resp failure improved.    Kike Knight    Primary Care Physician   Kal Guadalupe    Chief Complaint[PB1.1]   Shortness of breath[PB1.2]    History is obtained from[PB1.1] the patient, his significant other, and chart review[PB1.2]    History of Present Illness   Yuriy Ghosh is a 89 year old male who presents with[PB1.1] shortness of breath.  Patient was recently hospitalized at [facility], having been discharged to nursing home on 5/15.  He was noted to have renal failure, unclear if chronic versus acute component in addition to GI bleed secondary to duodenal ulcer.  He presents today secondary  "to complaints of shortness of breath.  His significant other reports he has been complaining of dyspnea and chest heaviness over the past 4 days.  There have been no documented fevers at his nursing facility per her report.  She has noted no cough.  She reports he has somewhat poor appetite and complained of lightheadedness a day ago.  Patient currently reports he is \"feeling fine.\"  He denies any shortness of breath, chest pain or pressure, lightheadedness, nausea, diarrhea, abdominal pain, dysuria or fevers or chills.  He denies any pain complaints.  And his remainder of review of systems is otherwise negative.[PB1.2]    Past Medical History[PB1.1]    Hypertension  Peripheral arterial disease  Hypothyroidism  Seizure disorder  Hyperlipidemia  BPH  Dementia  Depression  Anxiety  Diverticulosis  Moderate aortic stenosis  Chronic kidney disease stage IV  Recent GI bleed secondary to duodenal ulcer[PB1.2]    Past Surgical History[PB1.1]   AAA repair with endovascular stent  Tonsillectomy and adenoidectomy  Right hernia repair  Hemorrhoidectomy  Knee arthroscopy[PB1.2]    Prior to Admission Medications   Prior to Admission Medications   Prescriptions Last Dose Informant Patient Reported? Taking?   CYANOCOBALAMIN PO 5/27/2018 at AM Nursing Home Yes Yes   Sig: Take 2,500 mcg by mouth daily   DONEPEZIL HCL PO 5/26/2018 at PM Nursing Home Yes Yes   Sig: Take 10 mg by mouth At Bedtime   FOLIC ACID PO 5/27/2018 at AM Nursing Home Yes Yes   Sig: Take 400 mcg by mouth daily   LEVOTHYROXINE SODIUM PO 5/27/2018 at AM Nursing Home Yes Yes   Sig: Take 125 mcg by mouth daily   LevETIRAcetam (KEPPRA PO) 5/26/2018 at PM Nursing Home Yes Yes   Sig: Take 250 mg by mouth At Bedtime   MEMANTINE HCL PO 5/27/2018 at AM Nursing Home Yes Yes   Sig: Take 10 mg by mouth 2 times daily   VITAMIN D, CHOLECALCIFEROL, PO 5/27/2018 at AM Nursing Home Yes Yes   Sig: Take 1,000 Units by mouth daily   acetaminophen (TYLENOL) 325 MG tablet PRN med " Nursing Home No Yes   Sig: Take 2 tablets (650 mg) by mouth every 4 hours as needed for mild pain or fever   amLODIPine (NORVASC) 10 MG tablet 5/27/2018 at AM Nursing Home No Yes   Sig: Take 1 tablet (10 mg) by mouth daily   aspirin 81 MG chewable tablet on HOLD as of 5/22 Nursing Home Yes No   Sig: Take 81 mg by mouth daily   calcium citrate-vitamin D (CITRACAL) 315-250 MG-UNIT TABS per tablet 5/27/2018 at AM Nursing Home Yes Yes   Sig: Take 1 tablet by mouth 2 times daily   finasteride (PROSCAR) 5 MG tablet 5/27/2018 at AM Nursing Home No Yes   Sig: Take 1 tablet (5 mg) by mouth daily   hydrALAZINE (APRESOLINE) 50 MG tablet 5/27/2018 at AM Nursing Home No Yes   Sig: Take 1 tablet (50 mg) by mouth 3 times daily   pantoprazole (PROTONIX) 40 MG EC tablet 5/27/2018 at AM Nursing Home No Yes   Sig: Take 1 tablet (40 mg) by mouth every morning      Facility-Administered Medications: None     Allergies   No Known Allergies    Social History   I have personally reviewed the social history with the patient showing[PB1.1] no tobacco or alcohol use.  He was currently residing at [facility][PB1.2].    Family History[PB1.1]   Reviewed and noncontributory.[PB1.2]      Review of Systems   The 10 point Review of Systems is negative other than noted in the HPI or here.     Physical Exam   Temp: 99.7  F (37.6  C) Temp src: Temporal BP: 151/81 Pulse: 86 Heart Rate: 90 Resp: 25 SpO2: 94 % O2 Device: Nasal cannula Oxygen Delivery: 5 LPM  Vital Signs with Ranges  Temp:  [98  F (36.7  C)-99.7  F (37.6  C)] 99.7  F (37.6  C)  Pulse:  [86] 86  Heart Rate:  [80-96] 90  Resp:  [16-37] 25  BP: (132-159)/(71-85) 151/81  SpO2:  [85 %-94 %] 94 %  0 lbs 0 oz    Constitutional: well developed, well nourished male in no acute distress  Eyes: pupils equal, round and reactive to light, no icterus  HEENT: head normocephalic, atraumatic, mucous membranes moist, no cervical lymphadenopathy  Respiratory: anterior crackles, mildly diminished  bibasilar lung sounds, no wheezing or tachypnea  Cardiovascular: regular rate and rhythm, normal S1/S2, with grade 2/6 systolic murmur  GI: abdomen soft, non-tender, non-distended, normal bowel sounds, no hepatosplenomegaly  Lymph/Hematologic: 3+ lower extremity edema  Skin: No rash or bruising  Musculoskeletal: Extremities warm and well perfused  Neurologic: alert, pleasantly confused, cranial nerves grossly intact  Psychiatric: normal affect    Data   Data reviewed today:  I personally reviewed the EKG tracing showing 1st degree AV block, diffuse T-wave flattening (unchanged from prior) and the chest x-ray image(s) showing diffuse patchy opacities.    Recent Labs  Lab 05/27/18  1458   WBC 15.3*   HGB 6.3*   MCV 90      INR 1.15*   *   POTASSIUM 4.6   CHLORIDE 113*   CO2 23   BUN 59*   CR 4.37*   ANIONGAP 9   TEMO 8.1*   *   TROPI 0.066*       Recent Results (from the past 24 hour(s))   Chest XR,  PA & LAT    Narrative    CHEST TWO VIEWS  5/27/2018 3:43 PM     HISTORY: Check for pneumonia, short of breath, cough and hypoxia.     COMPARISON: Images from prior exam 6/16/1998 are no longer available.  Report from prior study describes healing posterior left-sided rib  fracture.      Impression    IMPRESSION: Left perihilar and right perihilar and peripheral patchy  infiltrates. Bilateral pleural effusions, seen best on the lateral  view. Heart size difficult to evaluate due to rotation of the patient.  Vascular calcifications are seen.[PB1.1]             Revision History        User Key Date/Time User Provider Type Action    > PB1.2 5/27/2018  6:47 PM Kike Knight MD Physician Sign     PB1.1 5/27/2018  5:07 PM Kike Knight MD Physician                   Discharge Summaries     No notes of this type exist for this encounter.         Consult Notes      Consults by Aga Clancy APRN CNP at 5/29/2018  3:21 PM     Author:  Aga Clancy APRN CNP Service:  Palliative Author Type:  Nurse  Practitioner    Filed:  5/29/2018  3:21 PM Date of Service:  5/29/2018  3:21 PM Creation Time:  5/29/2018  3:00 PM    Status:  Signed :  Aga Clancy APRN CNP (Nurse Practitioner)         [facility]    Palliative Care Consultation     Yuriy Ghosh  MRN# [id number]  Date of Admission:  5/27/2018  Date of Service (when I saw the patient):[AW1.1] 05/29/18[AW1.2]  Reason for consult: Consulted by Dr. Connell for Goals of care, Patient and family support[AW1.1]    Assessment & Plan[AW1.2]   Yuriy Ghosh is a 89 year old male with PMH significant for advanced dementia, HTN, PAD, CHF with EF 45-50%, hypothyroid, seizure disorder, CKD stage IV/V, and recent hospitalization 5/1-5/15 for GIB 2/2 duodenal ulcer who presents with SOB. His hospitalization has been complicated by acute respiratory failure and acute blood loss anemia 2/2 ongoing GIB. He is hemodynamically unstable to pursue required endoscopy. He has ongoing renal failure. He is requiring diuresis with blood product support. His prognosis is very poor. We are consulted for goals of care and pt and family support.     Symptoms/Recommendations   -Transition to comfort measures only, stop checking VS, labs, tele, blood transfusion support   -O2 support for comfort; do not escalate to bipap. Treat dyspnea/SOB with dilaudid 2-4mg SL every 2hrs PRN   -Regular diet for pleasure and comfort   -Haldol 1-2mg SL every 6hrs PRN agitation, delirium, hallucinations, restlessness, nausea   -Ativan 1mg SL every 3hrs PRN agitation, nausea   -SW consult for hospice placement; prepared pt's significant other that given how sick he is, it is very possible that he could decompensate while here in the hospital and may in fact die inpatient     Support/Coping  -Pt has a significant other, Velasquez, of 30+ years (friends for 50+ years). They live together, but never . Pt has 1 adult son, Pat, from a previous relationship. Per sig other, Pat is  "estranged from Sam and Eva. Pat has significant substance abuse issues, is \"very dangerous\" and has collusion with the police department. She was adamant that Pat not be contacted, as this would be very distressing to Sam to see him at end of life. I told her that we have tried to find him, but have not been able to make contact. I did encourage Eva to reach out to additional family/friends for support, and to notify them of Sam's declining health. Pt's sister  of kidney failure   -Pt is Sabianist with some Restoration roots. They do not have a Protestant connection, but value their genia. Appreciate support from Palliative , Alondra Morales    Decisional Support, Goals of Care, Counseling & Coordination  Decisional Capacity Intact?  -No  Health Care Directive on File?  -No. There is a directive on file, but this is for financial purposes. It does not address health care surrogate decision making. In the absence of a directive, decision making would default to adult children, given that pt is not  to Eva. However, there appears to be no viable connection between Sam and his son Debi; they are estranged per report of sig other Eva. She has no way to contact Pat. Unit KIMBERLY Barakat has made ample attempts at finding him through the Internet without success. Given that there is no true next of kin, we will ultimately defer to his life long partner, Eva, to assist as surrogate decision maker given the critical nature of his health, and given her devotion, loyalty, and willingness to serve in this role   Code Status/Resuscitation Preferences?  -DNR/DNI     Discussion  Visited with sig other Velasquez in private this afternoon. Introduced the scope of our practice to Velasquez. Discussed our potential roles for symptom management, support/coping, and decisional support (aka goals of care).     She was initially suspicious that I was only there to talk to her about the advanced directive issue. I clarified that I was in " fact there to visit with her regarding Sam's health and how to best care for him.    Eva understands that Sam has failing kidneys. Although she does not go into great technicality regarding his health, she tells me that she understands that he is very sick and probably on his way out.     I confirmed with her that I agree that Sam is very sick. I express concern that I believe he is dying. We talk about his kidneys failing, along with his heart, lungs, GI system, and brain.     Eva is tearful in acknowledging that Sam will die. We talk about how to best care for him through a comfort care approach. I recommend stepping away from aggressive treatments that are prolonging his life, while also prolonging his death. We talk about allowing him to eat and drink for pleasure, and prioritizing pain and symptom management for whatever amount of time he has left to live. Eva is in complete agreement. She wonders how much time he has to live. I share with her that I think we are likely measuring time in days to maybe 1-2 weeks at this time. I prepare her that he could decline quickly and even die in the hospital. Should he plateau with this new treatment plan, sometimes we start the conversation of going to a nursing facility with hospice. She is in complete agreement.     Eva is appropriately tearful, but tells me she is strong and will be ok. She has her own support system, and relies on her genia.     We then visited Sam. He is alert and conversant. He is in no distress. He wants to drink water. He denies pain, SOB, nausea. Beyond this, he is unable to hold any meaningful conversation.     Case reviewed at length with nursing staff, unit SW Roshni Rodriguez, and chair of ethics committee Harvey Strickland.     Thank you for involving us in the care of this patient and family. We will continue to follow. Please do not hesitate to contact me with questions or concerns or the on-call provider for our team if evening or  "weekend.    Cailin FLORENTINO, [facility]  Palliative Medicine   Pager [phone]    Attestation:  Total time on the floor involved in the patient's care: 70 minutes  Total time spent in counseling/care coordination: >50%[AW1.1]    Chief Complaint[AW1.2]   SOB    History is obtained from the patient, staff, family and extensive chart review.[AW1.1]     Past Medical History[AW1.2]    I have reviewed this patient's medical history and updated it with pertinent information if needed.[AW1.1]   Past Medical History:   Diagnosis Date     Alzheimer disease      BPH (benign prostatic hyperplasia)      Bradycardia      Duodenal ulceration      High cholesterol      PAD (peripheral artery disease) (H)      Seizure (H)      Thyroid disease        Past Surgical History[AW1.2]   I have reviewed this patient's surgical history and updated it with pertinent information if needed.[AW1.1]  Past Surgical History:   Procedure Laterality Date     CARDIAC SURGERY      stent       Social History[AW1.2]   Living situation: Had been living with sig other Eva of 30+ years in her home on [address] in [place], [place]. Recently has [facility]    Family system: Pt has a significant other, Eva, of 30+ years (friends for 50+ years). They live together, but never . Pt has 1 adult son, Pat, from a previous relationship. Per sig other, Pat is estranged from Sam and Eva. Pat has significant substance abuse issues, is \"very dangerous\" and has collusion with the police department. She was adamant that Pat not be contacted, as this would be very distressing to Sam to see him at end of life. I told her that we have tried to find him, but have not been able to make contact. I did encourage Eva to reach out to additional family/friends for support, and to notify them of Sam's declining health. Pt's sister  of kidney failure     Self-identified support system: Velasquez     Employment/education: [place]    Activities/interests: They previously enjoyed " vacationing in [place]     Use of community resources: [place]    Rastafarian affiliation: Hinduism     Involvement in genia community: None     Impact of illness on patient: Pt has multi-organ system failure. He is dying. Care now to be focused on his comfort moving forward.[AW1.1]     Family History[AW1.2]   I have reviewed this patient's family history and updated it with pertinent information if needed.[AW1.1]   No family history on file.    Allergies   No Known Allergies    Medications   Current Facility-Administered Medications Ordered in Epic   Medication Dose Route Frequency Last Rate Last Dose     acetaminophen (TYLENOL) Suppository 650 mg  650 mg Rectal Q4H PRN         acetaminophen (TYLENOL) tablet 650 mg  650 mg Oral Q4H PRN         albuterol neb solution 2.5 mg  3 mL Nebulization Q2H PRN   2.5 mg at 05/27/18 1943     artificial saliva (BIOTENE DRY MOUTHWASH) liquid 15 mL  15 mL Swish & Spit 4x Daily PRN         atropine 1 % ophthalmic solution 1-2 drop  1-2 drop Sublingual Q1H PRN         bisacodyl (DULCOLAX) Suppository 10 mg  10 mg Rectal Daily PRN         donepezil (ARICEPT) tablet 10 mg  10 mg Oral At Bedtime         finasteride (PROSCAR) tablet 5 mg  5 mg Oral Daily   5 mg at 05/29/18 0900     furosemide (LASIX) injection 20 mg  20 mg Intravenous Daily   20 mg at 05/29/18 1114     glycopyrrolate (ROBINUL) injection 0.2-0.4 mg  0.2-0.4 mg Intravenous Q4H PRN         haloperidol (HALDOL) 2 MG/ML (HIGH CONC) solution 1-2 mg  1-2 mg Oral Q6H PRN         HYDROmorphone (DILAUDID) (HIGH CONC) oral solution 2-4 mg  2-4 mg Sublingual Q2H PRN         hypromellose-dextran (ARTIFICAL TEARS) 0.1-0.3 % ophthalmic solution 1 drop  1 drop Both Eyes Q1H PRN         levETIRAcetam (KEPPRA) tablet 250 mg  250 mg Oral At Bedtime         levothyroxine (SYNTHROID/LEVOTHROID) tablet 125 mcg  125 mcg Oral QAM AC   125 mcg at 05/29/18 0901     lidocaine (LMX4) cream   Topical Q1H PRN         lidocaine 1 % 1 mL  1 mL Other Q1H PRN          LORazepam (ATIVAN) 1 mg/0.5 mL (HIGH CONC) solution 1 mg  1 mg Oral Q3H PRN         memantine (NAMENDA) tablet 10 mg  10 mg Oral BID   10 mg at 05/29/18 0859     ondansetron (ZOFRAN-ODT) ODT tab 4 mg  4 mg Oral Q6H PRN        Or     ondansetron (ZOFRAN) injection 4 mg  4 mg Intravenous Q6H PRN         Patient may continue current oral medications   Does not apply Continuous PRN         Patient may continue current oral medications   Does not apply Continuous PRN         polyethylene glycol (MIRALAX/GLYCOLAX) Packet 17 g  17 g Oral Daily PRN         prochlorperazine (COMPAZINE) injection 5 mg  5 mg Intravenous Q6H PRN        Or     prochlorperazine (COMPAZINE) tablet 5 mg  5 mg Oral Q6H PRN        Or     prochlorperazine (COMPAZINE) Suppository 12.5 mg  12.5 mg Rectal Q12H PRN         senna-docusate (SENOKOT-S;PERICOLACE) 8.6-50 MG per tablet 1 tablet  1 tablet Oral BID PRN        Or     senna-docusate (SENOKOT-S;PERICOLACE) 8.6-50 MG per tablet 2 tablet  2 tablet Oral BID PRN         sodium chloride (PF) 0.9% PF flush 3 mL  3 mL Intracatheter Q1H PRN   3 mL at 05/28/18 1016     sodium chloride (PF) 0.9% PF flush 3 mL  3 mL Intracatheter Q8H   3 mL at 05/29/18 0924     No current Epic-ordered outpatient prescriptions on file.       Review of Systems[AW1.2]   The comprehensive review of systems is negative other than noted here and in the assessment/plan.    Palliative Symptom Review (0=no symptom/no concern, 1=mild, 2=moderate, 3=severe):  Pain: 0  Nausea: 0  Diarrhea: 2  Shortness of Breath: 0[AW1.1]    Physical Exam   Temp: 98  F (36.7  C) Temp src: Oral BP: (!) 161/92 Pulse: 82 Heart Rate: 85 Resp: 22 SpO2: 99 % O2 Device: Oxymask Oxygen Delivery: 8 LPM  Vitals:    05/28/18 0500 05/29/18 0608   Weight: 69.5 kg (153 lb 3.5 oz) 65.6 kg (144 lb 10 oz)[AW1.2]     CONSTITUTIONAL: Chronically ill elderly man seen lying in bed in NAD, alert, disoriented. He appears calm and cooperative. Sig other present   HEENT:  NCAT  RESPIRATORY: NL respiratory effort on facemask   NEUROLOGIC: Seemingly appropriately responsive during interview at times, but otherwise disoriented with fixated thoughts (on water)[AW1.1]    Data   Results for orders placed or performed during the hospital encounter of 05/27/18 (from the past 24 hour(s))   Hemoglobin   Result Value Ref Range    Hemoglobin 7.7 (L) 13.3 - 17.7 g/dL   Lactic acid level STAT for sepsis protocol   Result Value Ref Range    Lactate for Sepsis Protocol 0.5 0.4 - 1.9 mmol/L   Hemoglobin (Every 4 Hrs)   Result Value Ref Range    Hemoglobin 7.8 (L) 13.3 - 17.7 g/dL   Basic metabolic panel   Result Value Ref Range    Sodium 149 (H) 133 - 144 mmol/L    Potassium 4.3 3.4 - 5.3 mmol/L    Chloride 118 (H) 94 - 109 mmol/L    Carbon Dioxide 23 20 - 32 mmol/L    Anion Gap 8 3 - 14 mmol/L    Glucose 90 70 - 99 mg/dL    Urea Nitrogen 71 (H) 7 - 30 mg/dL    Creatinine 4.60 (H) 0.66 - 1.25 mg/dL    GFR Estimate 12 (L) >60 mL/min/1.7m2    GFR Estimate If Black 15 (L) >60 mL/min/1.7m2    Calcium 7.3 (L) 8.5 - 10.1 mg/dL   Phosphorus   Result Value Ref Range    Phosphorus 5.3 (H) 2.5 - 4.5 mg/dL   Magnesium   Result Value Ref Range    Magnesium 2.1 1.6 - 2.3 mg/dL   Hemoglobin (Every 4 Hrs)   Result Value Ref Range    Hemoglobin 7.0 (L) 13.3 - 17.7 g/dL   Hemoglobin (Every 4 Hrs)   Result Value Ref Range    Hemoglobin 9.4 (L) 13.3 - 17.7 g/dL[AW1.2]                    Revision History        User Key Date/Time User Provider Type Action    > AW1.2 5/29/2018  3:21 PM Aga Clancy APRN CNP Nurse Practitioner Sign     AW1.1 5/29/2018  3:00 PM Aga Clancy APRN CNP Nurse Practitioner             Consults by Alex Alcantar MD at 5/28/2018 10:00 AM     Author:  Alex Alcantar MD Service:  Gastroenterology Author Type:  Physician    Filed:  5/28/2018  9:49 PM Date of Service:  5/28/2018 10:00 AM Creation Time:  5/28/2018  9:36 PM    Status:  Signed :  Alex Alcantar MD  (Physician)     Consult Orders:    1. Gastroenterology IP Consult: Recent GI bleed due to ulcer.  Now with anemia.  Transfused 2 units and Hgb only went 6.3-6.9.  Concern for recurrence of bleed; Consultant may enter orders: Yes; Patient to be seen: Routine - within 24 hours; Requeste... [[id number]] ordered by Tutu Crooks,  at 05/28/18 0827                [facility]  Gastroenterology Consultation         Yuriy Ghosh  [address] [place] [address]  89 year old male    Admission Date/Time: 5/27/2018  Primary Care Provider: Kal Guadalupe  Referring / Attending Physician:  Dr. Crooks     We were asked to see the patient in consultation by Dr. Crooks for evaluation of Anemia.      CC: Anemia    HPI:  Yuriy Ghosh is a 89 year old male with history of advanced chronic kidney disease hypertension peripheral arterial disease hypothyroidism seizure disorder BPH CHF with recent hospitalization due to significant renal problem.  Prior to discharge patient developed acute upper GI bleed for which patient was evaluated with upper GI endoscopy and actively bleeding ulcer was cauterized and the second portion of duodenum and in the duodenal bulb area patient did well patient was discharged now patient was admitted through ER with significant worsening of shortness of breath with hypoxia his pulse ox was 84% on room air chest x-ray showed significant infiltrate and fluid overload suspect CHF versus pneumonia patient had significant lower  extremity edema.  His proBNP was more than 48,000 patient's hemoglobin was 6.3 patient was started on IV antibiotics.Patient was given 2 units of blood patient's hemoglobin is still 6.9 this morning patient is getting more blood patient has been in significant respiratory distress patient was on BiPAP which was just taken off patient is on facemask oxygen patient is still tachypneic and having difficulty completing sentences and appears short of  breath.  Patient's JEREMIAH on May 4 showed ejection fraction between 45-50% with 1-2+ MR and TR moderate aortic stenosis pulmonary hypertension.  His creatinine at the time of admission was 4.3.  Since his admission patient has not had any signs of active GI bleeding no bowel movement no nausea abdominal pain hematemesis melena patient is currently on IV Protonix.  Patient is denying any active GI symptoms.  Patient's current symptoms are primarily significant respiratory distress.  Patient's symptoms and clinical condition was discussed in detail with patient's significant other.    ROS: A comprehensive ten point review of systems was negative aside from those in mentioned in the HPI.      PAST MED HX:  I have reviewed this patient's medical history and updated it with pertinent information if needed.   Past Medical History:   Diagnosis Date     Alzheimer disease      BPH (benign prostatic hyperplasia)      Bradycardia      Duodenal ulceration      High cholesterol      PAD (peripheral artery disease) (H)      Seizure (H)      Thyroid disease        MEDICATIONS:   Prior to Admission Medications   Prescriptions Last Dose Informant Patient Reported? Taking?   CYANOCOBALAMIN PO 5/27/2018 at AM Nursing Home Yes Yes   Sig: Take 2,500 mcg by mouth daily   DONEPEZIL HCL PO 5/26/2018 at PM Nursing Home Yes Yes   Sig: Take 10 mg by mouth At Bedtime   FOLIC ACID PO 5/27/2018 at AM Nursing Home Yes Yes   Sig: Take 400 mcg by mouth daily   LEVOTHYROXINE SODIUM PO 5/27/2018 at AM Nursing Home Yes Yes   Sig: Take 125 mcg by mouth daily   LevETIRAcetam (KEPPRA PO) 5/26/2018 at PM Nursing Home Yes Yes   Sig: Take 250 mg by mouth At Bedtime   MEMANTINE HCL PO 5/27/2018 at AM Nursing Home Yes Yes   Sig: Take 10 mg by mouth 2 times daily   VITAMIN D, CHOLECALCIFEROL, PO 5/27/2018 at AM Nursing Home Yes Yes   Sig: Take 1,000 Units by mouth daily   acetaminophen (TYLENOL) 325 MG tablet PRN med Nursing Home No Yes   Sig: Take 2 tablets (650  mg) by mouth every 4 hours as needed for mild pain or fever   amLODIPine (NORVASC) 10 MG tablet 5/27/2018 at AM Nursing Home No Yes   Sig: Take 1 tablet (10 mg) by mouth daily   aspirin 81 MG chewable tablet on HOLD as of 5/22 Nursing Home Yes No   Sig: Take 81 mg by mouth daily   calcium citrate-vitamin D (CITRACAL) 315-250 MG-UNIT TABS per tablet 5/27/2018 at AM Nursing Home Yes Yes   Sig: Take 1 tablet by mouth 2 times daily   finasteride (PROSCAR) 5 MG tablet 5/27/2018 at AM Nursing Home No Yes   Sig: Take 1 tablet (5 mg) by mouth daily   hydrALAZINE (APRESOLINE) 50 MG tablet 5/27/2018 at AM Nursing Home No Yes   Sig: Take 1 tablet (50 mg) by mouth 3 times daily   pantoprazole (PROTONIX) 40 MG EC tablet 5/27/2018 at AM Nursing Home No Yes   Sig: Take 1 tablet (40 mg) by mouth every morning      Facility-Administered Medications: None       ALLERGIES: No Known Allergies    SOCIAL HISTORY:  Social History   Substance Use Topics     Smoking status: Former Smoker     Smokeless tobacco: Never Used     Alcohol use No       FAMILY HISTORY:  No family history on file.    PHYSICAL EXAM:   General awake alert short of breath  Vital Signs with Ranges  Temp: 98.8  F (37.1  C) Temp src: Axillary BP: 143/82 Pulse: 96 Heart Rate: 80 Resp: 15 SpO2: 98 % O2 Device: BiPAP/CPAP Oxygen Delivery: 10 LPM  I/O last 3 completed shifts:  In: 1760 [P.O.:360; I.V.:500; Other:300]  Out: -     Constitutional: healthy, alert and no distress   Cardiovascular: negative, PMI normal. No lifts, heaves, or thrills. RRR. No murmurs, clicks gallops or rub  Respiratory: negative, Percussion normal. Good diaphragmatic excursion. Lungs clear  Head: Normocephalic. No masses, lesions, tenderness or abnormalities  Neck: Neck supple. No adenopathy. Thyroid symmetric, normal size,, Carotids without bruits.  Abdomen: Abdomen soft, non-tender. BS normal. No masses, organomegaly  SKIN: no suspicious lesions or rashes          ADDITIONAL COMMENTS:   I reviewed  the patient's new clinical lab test results.   Recent Labs   Lab Test  05/28/18   1956  05/28/18   0738  05/27/18   2150  05/27/18   1458   05/13/18   0709   05/12/18   0625   05/01/18   1715   WBC   --   12.3*   --   15.3*   --   12.0*   --   11.5*   < >  8.5   HGB  7.7*  6.9*  6.5*  6.3*   < >  6.9*   < >  5.7*  Canceled, Test credited   < >  10.1*   MCV   --   88   --   90   --   87   --   87   < >  88   PLT   --   195   --   268   --   116*   --   151   < >  138*   INR   --    --    --   1.15*   --    --    --   1.26*   --   1.15*    < > = values in this interval not displayed.     Recent Labs   Lab Test  05/28/18   0738  05/27/18   1458  05/15/18   0725   POTASSIUM  4.4  4.6  4.1   CHLORIDE  114*  113*  112*   CO2  22  23  21   BUN  59*  59*  68*   ANIONGAP  10  9  8     Recent Labs   Lab Test  05/13/18   1625  05/10/18   1845  05/02/18   0130  05/01/18   1715   ALBUMIN   --    --    --   2.6*   BILITOTAL   --    --    --   0.3   ALT   --    --    --   11   AST   --    --    --   14   PROTEIN  100*  300*  300*   --        I reviewed the patient's new imaging results.        CONSULTATION ASSESSMENT AND PLAN:    Active Problems:    Acute respiratory failure with hypoxia (H)    Assessment: 89-year-old gentleman with significantly complicated history with multiple active problems including significant shortness of breath possible pneumonia versus CHF with significant fluid overload respiratory failure requiring BiPAP patient is significant respiratory distress and labored breathing without BiPAP patient is on IV antibiotics stage IV-V kidney disease.  Patient has developed anemia findings are worrisome for possible low-grade GI bleed other differential would include anemia due to chronic disease chronic kidney disease.  Patient will need further evaluation with endoscopic evaluation however at this point patient is not stable from cardiorespiratory standpoint.  Patient will not be able to tolerate sedation in the  "current situation.  I agree with further evaluation and nephrology and fluid management before any endoscopic workup.  If patient's upper GI endoscopy is negative patient will need further evaluation with possible colonoscopy overall patient will be at significantly high risk for complication findings differential diagnosis and different treatment options were discussed in detail with patient and his girlfriend.  Patient's case was discussed in detail with Dr. Crooks.  I will continue to follow patient closely along with you.  I would recommend further discussion and evaluation for long-term goals and plan including possible palliative care evaluation.           Alex Alcantar MD, [facility]  [facility].  Office: [phone]  Cell : [AB1.1]     Revision History        User Key Date/Time User Provider Type Action    > AB1.1 5/28/2018  9:49 PM Alex Alcantar MD Physician Sign                     Progress Notes - Physician (Notes from 05/29/18 through 06/01/18)      Progress Notes by Maribel Santos RN at 6/1/2018  4:17 PM     Author:  Tom, Maribel, RN Service:  Hospice Author Type:  Registered Nurse    Filed:  6/1/2018  4:21 PM Date of Service:  6/1/2018  4:17 PM Creation Time:  6/1/2018  4:17 PM    Status:  Signed :  Maribel Santos RN (Registered Nurse)          Hospice: Significant other Eva did not show up for hospice meeting until 2.45p. I had another hospice consult scheduled for 3p.so was not able to meet with her. Eva appears very stressed about where to send Sam. I am not sure she understands the hospice piece as she has been focusing on finding a \"good place\" for him to be discharged to. We had tentatively set up a 3.30p time for hospice to meet with her at [facility] but have not been able to connect with her to confirm this. The hospice office staff will try and reach out to Eva to reschedule another time. Thank you. Maribel Santos RN, BSN  [facility] " Hospice Admission nurse  [phone][HR1.1]     Revision History        User Key Date/Time User Provider Type Action    > HR1.1 6/1/2018  4:21 PM Maribel Santos, RN Registered Nurse Sign            Progress Notes by Victor Huog Miner LSW at 6/1/2018  3:45 PM     Author:  Victor Hugo Miner LSW Service:  Social Work Author Type:      Filed:  6/1/2018  4:20 PM Date of Service:  6/1/2018  3:45 PM Creation Time:  6/1/2018  3:45 PM    Status:  Signed :  Victor Hugo Miner LSW ()         SW:    D:  Received discharge orders for patient.  Bed is available at [facility] for today.  SO is asking for transportation to be arranged. Explained that we can not guarantee that transportation services will be covered by insurance and SO is in agreement.  Call placed to [facility] to arrange for stretcher transport at 1630 today.  Called and informed SO that patient would be discharging at 1630 today.  Updated facility and faxed orders and the PAS.    PAS-RR    D: Per DHS regulation, SW completed and submitted PAS-RR to [place] Board on Aging Direct Connect via the Senior LinkAge Line.  PAS-RR confirmation # is :[AH1.1] [id number][AH1.2]    I: SW spoke with[AH1.1] Patient and Sig. Other[AH1.2] and they are aware a PAS-RR has been submitted.  KIMBERLY reviewed with[AH1.1] Patient and Sig Other[AH1.2] that they may be contacted for a follow up appointment within 10 days of hospital discharge if their SNF stay is < 30 days.  Contact information for Senior LinkAge Line was also provided.    A:[AH1.1] Patient and Sig Other[AH1.2] verbalized understanding.    P: Further questions may be directed to Senior LinkAge Line at #[phone], option #4 for PAS-RR staff.[AH1.1]  Discharge Planner[AH1.3]   Discharge Plans in progress: [facility].  Barriers to discharge plan: None  Follow up plan: SW will assist with discharge plan.        Entered by:[AH1.1] Victor Hugo Miner 06/01/2018 3:54 PM[AH1.3]            Revision  History        User Key Date/Time User Provider Type Action    > AH1.2 6/1/2018  4:20 PM Victor Hugo Miner LSW  Sign     AH1.3 6/1/2018  3:53 PM Victor Hugo Miner LSW       AH1.1 6/1/2018  3:45 PM Victor Hugo Miner LSW              Progress Notes by Victor Hugo Miner LSW at 6/1/2018 12:19 PM     Author:  Victor Hugo Miner LSW Service:  Social Work Author Type:      Filed:  6/1/2018  2:35 PM Date of Service:  6/1/2018 12:19 PM Creation Time:  6/1/2018 12:19 PM    Status:  Addendum :  Victor Hugo Miner LSW ()         KIMBERLY Note    I: Patient has been accepted at [facility] and [facility]. Patient would have a private room at [facility] and a shared at [facility].  Patient said it was okay for SO to sign paperwork for him for admission/hospice services.  SO is going to tour facilities today before 1400 appointment with hospice services and will have decision made on placement for today.  SO stated that she understood patient needed to discharge today to one of the given facilities.  SW discussed private pay fee's with patient and SO at facility for room and board.      P: SW will continue to follow for discharge.     IDALIA Ramirez, LGSW[AH1.1]     Addendum[AH1.2] ([id number])[AH1.3]: SO not present at 1400 for hospice meeting.  SW placed call to SO regarding appointment and left VM.[AH1.2]      Revision History        User Key Date/Time User Provider Type Action    > AH1.3 6/1/2018  2:35 PM Victor Hugo Miner LSW  Addend     AH1.2 6/1/2018  2:27 PM Victor Hugo Miner LSW  Addend     AH1.1 6/1/2018 12:22 PM Victor Hugo Miner LSW  Sign            Progress Notes by Victor Hugo Miner LSW at 5/31/2018  3:27 PM     Author:  Victor Hugo Miner LSW Service:  Social Work Author Type:      Filed:  5/31/2018  3:35 PM Date of Service:  5/31/2018  3:27 PM Creation Time:  5/31/2018  3:27 PM     Status:  Signed :  Victor Hugo Miner LSW ()         KIMBERLY Note:    I: SW spoke with SO and patient about sending additional referrals out to agencies.  They said that they were not familiar with other facilities and wanted SW to send out to places close to the hospital which will be easier for SO to travel to.  SW sent referrals to [facility], [facility], [facility] [place], [facility], [facility] and [facility].  [facility] called back and said they do not have any LTC Hospice beds available at this time.  SW talked with patient and SO about hospice services and they feel that it will be a nice benefit for patient to have and are willing to talk with [facility] hospice liaison.  Appointment time requested for 1400 on 6/1/18. Informed hospice liaison and confirmed.    P: SW will continue to assist and follow with discharge planning.    IDALIA Ramirez, LGSW[AH1.1]             Revision History        User Key Date/Time User Provider Type Action    > AH1.1 5/31/2018  3:35 PM Victor Hugo Miner LSW  Sign            Progress Notes by Damaris Connell MD at 5/31/2018  2:57 PM     Author:  Damaris Connell MD Service:  Hospitalist Author Type:  Physician    Filed:  5/31/2018  2:59 PM Date of Service:  5/31/2018  2:57 PM Creation Time:  5/31/2018  2:57 PM    Status:  Signed :  Damaris Connell MD (Physician)         [facility]    Hospitalist Progress Note    Assessment & Plan   Yuriy Ghosh is a 89 year old male with a past medical history of HTN, PAD, hypothyroid, seizure disorder, BPH, CKD, CHF, recent hospitalization complicated by GI bleed due to duodenal ulcer who presented with shortness of breath.    Advance care plan  Palliative care met with pt's significant other and he was transitioned to comfort care only on 5/29/18. Comfort care orders initiated. Awaiting placement now      Acute hypoxic respiratory failure  Suspected HFrEF exacerbation   Possible HCAP  Pulmonary HTN    Comfort care only  Continue lasix 40mg PO daily for comfort    SARAHI on CKD stage IV  Evaluated by Nephrology during last admission, suspect progression of CKD due to HTN and vascular disease.   Comfort care only   Acute blood loss anemia  on chronic anemia  Experienced GIB due to duodenal ulcer on 5/12/18.  Discharge hgb 7.8, admission hgb 6.3.  He has received 2 units of PRBCs and only radha to 6.9.  No known melena or hematochezia but slow GI bleed can not be excluded.  Could also be due to poor RBC production   - he continuous to have dark stools   Received 4units of PRBCS so far   - GI consult placed and at this time holding off on repeat endoscopy due to the respiratory failure   - comfort care only      HTN  Comfort care only      Hypothyroid  - Continue PTA levothyroxine     Seizure disorder  - Continue PTA Keppra     Dementia  - Continue PTA donepezil and memantine  - PRN seroquel for agitation     BPH  - Continue PTA finasteride    # Pain Assessment:  Current Pain Score 5/31/2018   Patient currently in pain? denies   Pain score (0-10) -   Pain location -   Yuriy s pain level was assessed and he currently denies pain.        DVT Prophylaxis: Pneumatic Compression Devices  Code Status: DNR/DNI with comfort care only     Disposition: discharge to TCu or hospice facility when bed is available     Damaris Connell MD       Interval History   Patient seen and examined.  Appears comfortable today     -Data reviewed today: I reviewed all new labs and imaging results over the last 24 hours. I personally reviewed no images or EKG's today.    Physical Exam            Resp: 20        Vitals:    05/28/18 0500 05/29/18 0608   Weight: 69.5 kg (153 lb 3.5 oz) 65.6 kg (144 lb 10 oz)     Vital Signs with Ranges  Resp:  [20-24] 20  I/O last 3 completed shifts:  In: 100 [P.O.:100]  Out: -     Constitutional: Pleasantly demented.  Awake and alert  Respiratory: CTA, anteriorly   Cardiovascular: Regular rate and rhythm, normal S1  and S2, and 2+ murmur noted  GI: Normal bowel sounds, soft, non-distended, non-tender  Skin/Integumen: No rashes, no cyanosis  MSK: Bilateral lower extremity edema     Medications     - MEDICATION INSTRUCTIONS -       - MEDICATION INSTRUCTIONS -         furosemide  40 mg Oral Daily     levETIRAcetam (KEPPRA) tablet 250 mg  250 mg Oral At Bedtime       Data     Recent Labs  Lab 05/29/18  1300 05/29/18  0510 05/29/18  0025  05/28/18  0738 05/28/18  0210 05/27/18  2150 05/27/18  1810 05/27/18  1458   WBC  --   --   --   --  12.3*  --   --   --  15.3*   HGB 9.4* 7.0* 7.8*  < > 6.9*  --  6.5*  --  6.3*   MCV  --   --   --   --  88  --   --   --  90   PLT  --   --   --   --  195  --   --   --  268   INR  --   --   --   --   --   --   --   --  1.15*   NA  --  149*  --   --  146*  --   --   --  145*   POTASSIUM  --  4.3  --   --  4.4  --   --   --  4.6   CHLORIDE  --  118*  --   --  114*  --   --   --  113*   CO2  --  23  --   --  22  --   --   --  23   BUN  --  71*  --   --  59*  --   --   --  59*   CR  --  4.60*  --   --  4.32*  --   --   --  4.37*   ANIONGAP  --  8  --   --  10  --   --   --  9   TEMO  --  7.3*  --   --  7.5*  --   --   --  8.1*   GLC  --  90  --   --  95  --   --   --  145*   TROPI  --   --   --   --   --  0.092* 0.062* 0.071* 0.066*   < > = values in this interval not displayed.    Imaging:   No results found for this or any previous visit (from the past 24 hour(s)).[ST1.1]      Revision History        User Key Date/Time User Provider Type Action    > ST1.1 5/31/2018  2:59 PM Damaris Connell MD Physician Sign            Progress Notes by Victor Hugo Miner LSW at 5/30/2018  3:08 PM     Author:  Victor Hugo Miner LSW Service:  Social Work Author Type:      Filed:  5/30/2018  3:34 PM Date of Service:  5/30/2018  3:08 PM Creation Time:  5/30/2018  3:08 PM    Status:  Addendum :  Victor Hugo Miner LSW ()         Care Transition Initial Assessment -   Reason For Consult: discharge  planning  Met with: Significant Other  Active Problems:    Acute respiratory failure with hypoxia (H)       DATA  Lives With: significant other  Living Arrangements: house  Description of Support System: Supportive, Involved  Who is your support system?: Significant Other  Support Assessment: Adequate family and caregiver support, Adequate social supports. Patient has son that previous  was unable to obtain contact information for or locate.   Identified issues/concerns regarding health management: Per social service protocol for discharge planning, patient was admitted on 5/27/18 with acute respiratory failure with hypoxia.  Reviewed chart and spoke with patient's significant other regarding discharge planning.  Per discussion with MD, patient would benefit from an inpatient hospice house. Writer spoke with significant other and provided list for hospice houses.  Spouse said she would like to have patient go to [facility] and said that she would probably tour the facility.  Writer provided information on private pay costs and she did not feel that she would be able to afford to send patient there.  Writer also provided a SNF list and SO chose for referrals to be sent to [facility].  She will look at alternative options and inform KIMBERLY.  KIMBERLY sent referral to [facility]. KIMBERLY discussed that LTC facilities would also be private pay.  SO in understanding.  SW will continue to assist with discharge planning.            Quality Of Family Relationships: supportive, involved       ASSESSMENT  Cognitive Status:  Alert.   Concerns to be addressed: Discharge planning.      PLAN  Financial costs for the patient includes: Private pay services for room and board cares.   Patient given options and choices for discharge: Provided SO with LTC and hospice list.   Patient/family is agreeable to the plan?  Yes  Patient Goals and Preferences: TBD  Patient anticipates discharging to:  IDALIA Harper,  LGSW[AH1.1]        Addendum:  Writer spoke with SO regarding hospice agencies.  SW informed her that [place] has a hospice agency.  She said that she would like to look at the options and let SW know when she comes back tonight.  SW said that she can bring in the LTC list and give to nurse for SW.  She will also let SW know about which hospice agency she wants to go with.  SW provided educations on hospice services coming to where patient is living whether it be a LTC facility, hospice house or wherever a patient is living. SW will continue to follow for discharge planning.[AH1.2]          Revision History        User Key Date/Time User Provider Type Action    > AH1.2 5/30/2018  3:34 PM Victor Hugo Miner LSW  Addend     AH1.1 5/30/2018  3:20 PM Victor Hugo Miner LSW  Sign            Progress Notes by Damaris Connell MD at 5/30/2018 12:35 PM     Author:  Damaris Connell MD Service:  Hospitalist Author Type:  Physician    Filed:  5/30/2018 12:38 PM Date of Service:  5/30/2018 12:35 PM Creation Time:  5/30/2018 12:35 PM    Status:  Signed :  Damaris Connell MD (Physician)         [facility]    Hospitalist Progress Note    Assessment & Plan   Yuriy Ghosh is a 89 year old male with a past medical history of HTN, PAD, hypothyroid, seizure disorder, BPH, CKD, CHF, recent hospitalization complicated by GI bleed due to duodenal ulcer who presented with shortness of breath.    Advance care plan  Palliative care met with pt's significant other and he was transitioned to comfort care only on 5/29/18. Comfort care orders initiated.      Acute hypoxic respiratory failure  Suspected HFrEF exacerbation   Possible HCAP  Pulmonary HTN   Comfort care only  Continue lasix 40mg PO daily for comfort    SARAHI on CKD stage IV  Evaluated by Nephrology during last admission, suspect progression of CKD due to HTN and vascular disease.   Comfort care only   Acute blood loss anemia  on chronic  anemia  Experienced GIB due to duodenal ulcer on 5/12/18.  Discharge hgb 7.8, admission hgb 6.3.  He has received 2 units of PRBCs and only radha to 6.9.  No known melena or hematochezia but slow GI bleed can not be excluded.  Could also be due to poor RBC production   - he continuous to have dark stools   Received 4units of PRBCS so far   - GI consult placed and at this time holding off on repeat endoscopy due to the respiratory failure   - comfort care only      HTN  Comfort care only      Hypothyroid  - Continue PTA levothyroxine     Seizure disorder  - Continue PTA Keppra     Dementia  - Continue PTA donepezil and memantine  - PRN seroquel for agitation     BPH  - Continue PTA finasteride    # Pain Assessment:  Current Pain Score 5/30/2018   Patient currently in pain? other (see comments)   Pain score (0-10) -   Pain location -   Yuriy knapp pain level was assessed and he currently denies pain.        DVT Prophylaxis: Pneumatic Compression Devices  Code Status: DNR/DNI with comfort care only     Disposition: discharge to TCu or hospice facility when bed is available   >35minutes were spent in taking care of him today. Half of which was spent in multidisciplinary rounds in collaboration of care     Damaris Connell MD       Interval History   Patient seen and examined.  Appears comfortable today     -Data reviewed today: I reviewed all new labs and imaging results over the last 24 hours. I personally reviewed no images or EKG's today.    Physical Exam   Temp: 100.3  F (37.9  C) (per significant other request) Temp src: Oral BP: (!) 132/94   Heart Rate: 89 Resp: 24 SpO2: 100 % O2 Device: Nasal cannula (patient taking mask, denies short of breath) Oxygen Delivery: 2 LPM ( suggested 2LPM/NC for comfort)  Vitals:    05/28/18 0500 05/29/18 0608   Weight: 69.5 kg (153 lb 3.5 oz) 65.6 kg (144 lb 10 oz)     Vital Signs with Ranges  Temp:  [98.1  F (36.7  C)-100.3  F (37.9  C)] 100.3  F (37.9  C)  Heart Rate:  []  89  Resp:  [22-30] 24  BP: (132-161)/(92-94) 132/94  SpO2:  [97 %-100 %] 100 %  I/O last 3 completed shifts:  In: 2050 [P.O.:1350; I.V.:100]  Out: 325 [Urine:325]    Constitutional: Pleasantly demented.  Awake and alert  Respiratory:bibasilar crackles.   Cardiovascular: Regular rate and rhythm, normal S1 and S2, and 2+ murmur noted  GI: Normal bowel sounds, soft, non-distended, non-tender  Skin/Integumen: No rashes, no cyanosis  MSK: Bilateral lower extremity edema     Medications     - MEDICATION INSTRUCTIONS -       - MEDICATION INSTRUCTIONS -         furosemide  40 mg Oral Daily     levETIRAcetam (KEPPRA) tablet 250 mg  250 mg Oral At Bedtime     sodium chloride (PF)  3 mL Intracatheter Q8H       Data     Recent Labs  Lab 05/29/18  1300 05/29/18  0510 05/29/18  0025  05/28/18  0738 05/28/18  0210 05/27/18  2150 05/27/18  1810 05/27/18  1458   WBC  --   --   --   --  12.3*  --   --   --  15.3*   HGB 9.4* 7.0* 7.8*  < > 6.9*  --  6.5*  --  6.3*   MCV  --   --   --   --  88  --   --   --  90   PLT  --   --   --   --  195  --   --   --  268   INR  --   --   --   --   --   --   --   --  1.15*   NA  --  149*  --   --  146*  --   --   --  145*   POTASSIUM  --  4.3  --   --  4.4  --   --   --  4.6   CHLORIDE  --  118*  --   --  114*  --   --   --  113*   CO2  --  23  --   --  22  --   --   --  23   BUN  --  71*  --   --  59*  --   --   --  59*   CR  --  4.60*  --   --  4.32*  --   --   --  4.37*   ANIONGAP  --  8  --   --  10  --   --   --  9   TEMO  --  7.3*  --   --  7.5*  --   --   --  8.1*   GLC  --  90  --   --  95  --   --   --  145*   TROPI  --   --   --   --   --  0.092* 0.062* 0.071* 0.066*   < > = values in this interval not displayed.    Imaging:   No results found for this or any previous visit (from the past 24 hour(s)).[ST1.1]      Revision History        User Key Date/Time User Provider Type Action    > ST1.1 5/30/2018 12:38 PM Damaris Connell MD Physician Sign            Progress Notes by Piper Morales  5/30/2018 11:05 AM     Author:  Piper Morales Service:  Spiritual Health Author Type:      Filed:  5/30/2018 11:07 AM Date of Service:  5/30/2018 11:05 AM Creation Time:  5/30/2018 11:05 AM    Status:  Signed :  Piper Morales ()         SPIRITUAL HEALTH SERVICES Progress Note  [facility]     Brief visit with pt and his partner, Eva, for support.  I met them in the heart center two days ago.    Pt has now transitioned to comfort care and transferred from heart [place] to .  Pt was alert but confused.  Eva expressed concern about pt's breathing, and explained to me that the doctors are unable to treat his problems.  She seemed upset, but didn't wish to have further conversation right now.      SH team is available if needs arise.                                                                                                                                                 Piper Morales M.A.  Staff   Pager [phone]  Phone [phone][PL1.1]         Revision History        User Key Date/Time User Provider Type Action    > PL1.1 5/30/2018 11:07 AM Piper Morales  Sign            Progress Notes by Alex Alcantar MD at 5/29/2018  3:30 PM     Author:  Alex Alcantar MD Service:  Gastroenterology Author Type:  Physician    Filed:  5/29/2018  9:53 PM Date of Service:  5/29/2018  3:30 PM Creation Time:  5/29/2018  9:48 PM    Status:  Signed :  Alex Alcantar MD (Physician)         [facility]  Gastroenterology Progress Note     Yuriy Ghosh MRN# [id number]   YOB: 1926 Age: 89 year old          Assessment and Plan:     Acute respiratory failure with hypoxia (H)  Patient is in significant respiratory distress patient is still on 10 L oxygen with mask patient is short of breath patient is feeling weak patient is accompanied by his girlfriend findings were discussed in detail with patient and family patient has  significant respiratory decompensation and significantly advanced kidney disease patient's last hemoglobin was 9.4 patient has not had any further bloody bowel movements.  Patient is on clear liquids and drinking patient is complaining of being thirsty.  At this point I would recommend the patient to be monitored clinically patient is not stable enough for any endoscopic intervention or sedation risks and complications were discussed with family.  Agree for further evaluation for palliative care and possible comfort care overall prognosis is significantly limited and guarded.  Patient's current status was explained to patient's significant other.            Hospital-acquired pneumonia  Anemia due to blood loss, acute  Acute systolic congestive heart failure (H)  Acute renal injury (H)      Interval History:   denies chest pain, denies abdominal pain and has had a bowel movement in the last 24 hours              Review of Systems:   C: NEGATIVE for fever, chills, change in weight  E/M: NEGATIVE for ear, mouth and throat problems  R: NEGATIVE for significant cough or SOB  CV: NEGATIVE for chest pain, palpitations or peripheral edema             Medications:   I have reviewed this patient's current medications[AB1.1]    donepezil (ARICEPT) tablet 10 mg  10 mg Oral At Bedtime     finasteride  5 mg Oral Daily     furosemide  20 mg Intravenous Daily     levETIRAcetam (KEPPRA) tablet 250 mg  250 mg Oral At Bedtime     levothyroxine (SYNTHROID/LEVOTHROID) tablet 125 mcg  125 mcg Oral QAM AC     memantine (NAMENDA) tablet 10 mg  10 mg Oral BID     sodium chloride (PF)  3 mL Intracatheter Q8H[AB1.2]                  Physical Exam:   Vitals were reviewed  Vital Signs with Ranges[AB1.1]  Temp:  [97.5  F (36.4  C)-99.3  F (37.4  C)] 98.1  F (36.7  C)  Pulse:  [82-89] 82  Heart Rate:  [] 89  Resp:  [12-30] 24  BP: (132-162)/() 132/94  FiO2 (%):  [40 %] 40 %  SpO2:  [96 %-100 %] 100 %  I/O last 3 completed shifts:  In:  2075 [P.O.:1110; I.V.:65]  Out: 325 [Urine:325][AB1.2]  Cardiovascular: negative, PMI normal. No lifts, heaves, or thrills. RRR. No murmurs, clicks gallops or rub  Respiratory: poor air movement and bilateral rales.  Head: Normocephalic. No masses, lesions, tenderness or abnormalities  Abdomen: Abdomen soft, non-tender. BS normal. No masses, organomegaly  SKIN: no suspicious lesions or rashes           Data:   I reviewed the patient's new clinical lab test results.[AB1.1]   Recent Labs   Lab Test  05/29/18   1300  05/29/18   0510  05/29/18   0025   05/28/18   0738   05/27/18   1458   05/13/18   0709   05/12/18   0625   05/01/18   1715   WBC   --    --    --    --   12.3*   --   15.3*   --   12.0*   --   11.5*   < >  8.5   HGB  9.4*  7.0*  7.8*   < >  6.9*   < >  6.3*   < >  6.9*   < >  5.7*  Canceled, Test credited   < >  10.1*   MCV   --    --    --    --   88   --   90   --   87   --   87   < >  88   PLT   --    --    --    --   195   --   268   --   116*   --   151   < >  138*   INR   --    --    --    --    --    --   1.15*   --    --    --   1.26*   --   1.15*    < > = values in this interval not displayed.     Recent Labs   Lab Test  05/29/18   0510  05/28/18   0738  05/27/18   1458   POTASSIUM  4.3  4.4  4.6   CHLORIDE  118*  114*  113*   CO2  23  22  23   BUN  71*  59*  59*   ANIONGAP  8  10  9     Recent Labs   Lab Test  05/13/18   1625  05/10/18   1845  05/02/18   0130  05/01/18   1715   ALBUMIN   --    --    --   2.6*   BILITOTAL   --    --    --   0.3   ALT   --    --    --   11   AST   --    --    --   14   PROTEIN  100*  300*  300*   --[AB1.2]        I reviewed the patient's new imaging results.    All laboratory data reviewed  All imaging studies reviewed by me.[AB1.1]    Alex Alcantar MD[AB1.2],  5/29/2018  [facility]  Office : [phone]  Cell: [phone][AB1.1]     Revision History        User Key Date/Time User Provider Type Action    > AB1.2 5/29/2018  9:53 PM  Alex Alcantar MD Physician Sign     AB1.1 5/29/2018  9:48 PM Alex Alcantar MD Physician             Progress Notes by Roshni Rodriguez LICSW at 5/29/2018  4:21 PM     Author:  Roshni Rodriguez LICSW Service:  Social Work Author Type:      Filed:  5/29/2018  4:22 PM Date of Service:  5/29/2018  4:21 PM Creation Time:  5/29/2018  4:21 PM    Status:  Signed :  Roshni Rodriguez LICSW ()         SW:  D:  Attempted to find patient's son, Vito Ghosh via the white [employer], [email or web address], and [employer].  There are many, many people with this same name.  Without any specific information we have been unsuccessful to find patient's son.  P:  Will continue to follow.[SJ1.1]     Revision History        User Key Date/Time User Provider Type Action    > SJ1.1 5/29/2018  4:22 PM Roshni Rodriguez LICSW  Sign            Progress Notes by Jaime Kaye MD at 5/29/2018  4:17 PM     Author:  Jaime Kaye MD Service:  Nephrology Author Type:  Physician    Filed:  5/29/2018  4:18 PM Date of Service:  5/29/2018  4:17 PM Creation Time:  5/29/2018  4:17 PM    Status:  Signed :  Jaime Kaye MD (Physician)         Orders in place for comfort care.  Our group will sign off.  Thanks.    Jaime Kaye MD  Nephrology; [facility], [facility]  [phone][JS1.1]       Revision History        User Key Date/Time User Provider Type Action    > JS1.1 5/29/2018  4:18 PM Jaime Kaye MD Physician Sign            Progress Notes by Damaris Connell MD at 5/29/2018  1:10 PM     Author:  Damaris Connell MD Service:  Hospitalist Author Type:  Physician    Filed:  5/29/2018  3:30 PM Date of Service:  5/29/2018  1:10 PM Creation Time:  5/29/2018  1:10 PM    Status:  Addendum :  Damaris Connell MD (Physician)         [facility]    Assessment & Plan   Yuriy Ghosh  is a 89 year old male with a past medical history of HTN, PAD, hypothyroid, seizure disorder, BPH, CKD, CHF, recent hospitalization complicated by GI bleed due to duodenal ulcer who presented with shortness of breath.    Advance care planning  At this time the patient has multiple comorbidities and declining status.  I am concerned that the patient may not recover from this during this hospitalization.  Nephrology does not feel as though the patient is a dialysis catheter and I question if aggressive cares would even be beneficial for this patient.  I feel end of life discussions should be addressed with the patient but unfortunately he is too demented to make decisions regarding this.  He does have a significant other who has POA paperwork but these records to not appear to address medical issues/medical POA decision making and look to be just financial POA.  It appears that the patient has a son who is estranged who may actually need to be contacted regarding end of life care.  End of life discussions have been brought up with the significant other during previous admission and she refused.  I spoke to Roshni with social work who stated that the significant other also has significant cognitive deficits noted on previous admissions and I wonder if she has decisional making capacity.    - Ethics consult placed given the difficult situation regarding who the medical POA should be, whether or not the significant other who was designated financial POA is decisional and if not what steps need to be taken   Palliative care consult placed as well RE goals of care discussion[ST1.1]     ADDENDUM:  Palliative care met with pt's significant other and he was transitioned to comfort care only. Comfort care orders initiated. Will tranfer pt to 88 with comfort cares only[ST1.2]      Acute hypoxic respiratory failure  Suspected HFrEF exacerbation   Possible HCAP  Pulmonary HTN   Hypoxia to 84% on room air upon arrival.  Suspect  multifactorial including possible pneumonia (new leukocytosis with left shift, though afebrile, lactate of 2.9) and acute on chronic CHF (pro-BNP >48K with marked lower extremity edema).  CXR showing bilateral patchy opacities though procalcitonin was not elevated   Last echo was 5/4/18 with an EF of 45-50% and global hypokinesia.  1-2+ MR, 1-2+ TR, moderate AS and pulmonary HTN also noted.  Not currently on beta blocker due to intolerance and not on ACEi/ARB due to CKD   - BiPAP PRN and wean O2 as needed  - Continue zosyn for now.  Will likely discontinue tomorrow as my suspicion for pneumonia is low and feel fluid overload is more likely the etiology of his hypoxia.    - Nephrology have him on  low dose 20 mg IV Lasix daily   - Strict I/Os and daily weights     SARAHI on CKD stage IV  Evaluated by Nephrology during last admission, suspect progression of CKD due to HTN and vascular disease.  Admission Cr 4.3, which is largely stable from recent discharge.  - Continue to monitor  - Nephrology following and appreciate their assistance.  Do not feel patient is a dialysis candidate   cr worse at 4.6 from 4.3 on admission   Acute blood loss anemia  on chronic anemia  Experienced GIB due to duodenal ulcer on 5/12/18.  Discharge hgb 7.8, admission hgb 6.3.  He has received 2 units of PRBCs and only radha to 6.9.  No known melena or hematochezia but slow GI bleed can not be excluded.  Could also be due to poor RBC production   - he continuous to have dark stools   Received 4units of PRBCS so far   - GI consult placed and at this time holding off on repeat endoscopy due to the respiratory failure   - on clear liquid diet per GI      HTN  - PTA Amlodipine and Hydralazine     Hypothyroid  - Continue PTA levothyroxine     Seizure disorder  - Continue PTA Keppra     Dementia  - Continue PTA donepezil and memantine  - PRN seroquel for agitation     BPH  - Continue PTA finasteride    # Pain Assessment:  Current Pain Score 5/29/2018    Patient currently in pain? denies   Pain score (0-10) -   Pain location -   Yuriy s pain level was assessed and he currently denies pain.        DVT Prophylaxis: Pneumatic Compression Devices  Code Status: DNR/DNI    Disposition: Expected discharge TBD.  Patient is still requiring BiPAP PRN.  Palliative care and Ethics consults placed   >35minutes were spent in taking care of him today. Half of which was spent in multidisciplinary rounds in collaboration of care     Damaris Connell MD       Interval History   Patient seen and examined.  Keeps asking for water. continuous to have GI bleed with black stools     -Data reviewed today: I reviewed all new labs and imaging results over the last 24 hours. I personally reviewed no images or EKG's today.    Physical Exam   Temp: 98  F (36.7  C) Temp src: Oral BP: 150/72 Pulse: 82 Heart Rate: 73 Resp: 22 SpO2: 96 % O2 Device: Oxymask Oxygen Delivery: 8 LPM  Vitals:    05/28/18 0500 05/29/18 0608   Weight: 69.5 kg (153 lb 3.5 oz) 65.6 kg (144 lb 10 oz)     Vital Signs with Ranges  Temp:  [97.5  F (36.4  C)-99.3  F (37.4  C)] 98  F (36.7  C)  Pulse:  [82-96] 82  Heart Rate:  [42-98] 73  Resp:  [11-27] 22  BP: (126-162)/(71-99) 150/72  FiO2 (%):  [40 %] 40 %  SpO2:  [95 %-100 %] 96 %  I/O last 3 completed shifts:  In: 1660 [P.O.:360; I.V.:100; Other:300]  Out: -     Constitutional: Pleasantly demented.  Awake and alert  Respiratory:bibasilar crackles. Using accesory muscles of respiration   Cardiovascular: Regular rate and rhythm, normal S1 and S2, and 2+ murmur noted  GI: Normal bowel sounds, soft, non-distended, non-tender  Skin/Integumen: No rashes, no cyanosis  MSK: Bilateral lower extremity edema     Medications     - MEDICATION INSTRUCTIONS -       pantoprazole (PROTONIX) infusion ADULT/PEDS GREATER than or EQUAL to 45 kg 8 mg/hr (05/29/18 1025)     - MEDICATION INSTRUCTIONS -       - MEDICATION INSTRUCTIONS -         amLODIPine  10 mg Oral Daily     aspirin  81 mg Oral  Daily     calcium citrate-vitamin D  1 tablet Oral BID     cholecalciferol  1,000 Units Oral Daily     donepezil (ARICEPT) tablet 10 mg  10 mg Oral At Bedtime     finasteride  5 mg Oral Daily     furosemide  20 mg Intravenous Daily     hydrALAZINE  50 mg Oral TID     levETIRAcetam (KEPPRA) tablet 250 mg  250 mg Oral At Bedtime     levothyroxine (SYNTHROID/LEVOTHROID) tablet 125 mcg  125 mcg Oral QAM AC     memantine (NAMENDA) tablet 10 mg  10 mg Oral BID     piperacillin-tazobactam  2.25 g Intravenous Q6H     sodium chloride (PF)  3 mL Intracatheter Q8H       Data     Recent Labs  Lab 05/29/18  0510 05/29/18  0025 05/28/18  1956 05/28/18  0738 05/28/18  0210 05/27/18  2150 05/27/18  1810 05/27/18  1458   WBC  --   --   --  12.3*  --   --   --  15.3*   HGB 7.0* 7.8* 7.7* 6.9*  --  6.5*  --  6.3*   MCV  --   --   --  88  --   --   --  90   PLT  --   --   --  195  --   --   --  268   INR  --   --   --   --   --   --   --  1.15*   *  --   --  146*  --   --   --  145*   POTASSIUM 4.3  --   --  4.4  --   --   --  4.6   CHLORIDE 118*  --   --  114*  --   --   --  113*   CO2 23  --   --  22  --   --   --  23   BUN 71*  --   --  59*  --   --   --  59*   CR 4.60*  --   --  4.32*  --   --   --  4.37*   ANIONGAP 8  --   --  10  --   --   --  9   TEMO 7.3*  --   --  7.5*  --   --   --  8.1*   GLC 90  --   --  95  --   --   --  145*   TROPI  --   --   --   --  0.092* 0.062* 0.071* 0.066*       Imaging:   No results found for this or any previous visit (from the past 24 hour(s)).[ST1.1]      Revision History        User Key Date/Time User Provider Type Action    > ST1.2 5/29/2018  3:30 PM Damaris Connell MD Physician Addend     ST1.1 5/29/2018  1:17 PM Damaris Connell MD Physician Sign            Progress Notes by Uma Epps RT at 5/29/2018  4:08 AM     Author:  Uma Epps RT Service:  (none) Author Type:  Respiratory Therapist    Filed:  5/29/2018  4:09 AM Date of Service:  5/29/2018  4:08 AM Creation Time:  5/29/2018   4:08 AM    Status:  Signed :  Uma Epps RT (Respiratory Therapist)         Pt stable on Bipap all shift, no issues overnight. Bipap alarm level set at 10.[MF1.1]  5/29/2018  Uma Epps[MF1.2]       Revision History        User Key Date/Time User Provider Type Action    > MF1.2 5/29/2018  4:09 AM Uma Epps RT Respiratory Therapist Sign     MF1.1 5/29/2018  4:08 AM Uma Epps RT Respiratory Therapist                   Procedure Notes     No notes of this type exist for this encounter.         Progress Notes - Therapies (Notes from 05/29/18 through 06/01/18)      Progress Notes by Uma Epps RT at 5/29/2018  4:08 AM     Author:  Uma Epps RT Service:  (none) Author Type:  Respiratory Therapist    Filed:  5/29/2018  4:09 AM Date of Service:  5/29/2018  4:08 AM Creation Time:  5/29/2018  4:08 AM    Status:  Signed :  Uma Epps RT (Respiratory Therapist)         Pt stable on Bipap all shift, no issues overnight. Bipap alarm level set at 10.[MF1.1]  5/29/2018  Uma Epps[MF1.2]       Revision History        User Key Date/Time User Provider Type Action    > MF1.2 5/29/2018  4:09 AM Uma Epps RT Respiratory Therapist Sign     MF1.1 5/29/2018  4:08 AM Uma Epps RT Respiratory Therapist

## 2018-05-27 NOTE — ED PROVIDER NOTES
History     Chief Complaint:  Shortness of Breath     HPI   Yuriy Ghosh is a 89 year old male who presents to the emergency department today for evaluation of shortness of breath. EMS reports they were called by the nursing home when staff saw the patient had low hemoglobin and other abnormal lab values. EMS also states the nursing home reported the patient experiencing some shortness of breath and foot swelling for an unknown amount of time. EMS notes that en route the patient was at 90% oxygen saturation on room air, and was given a duoneb and placed on a nasal cannula. EMS also notes the patient feels feverish and has no complaints of pain. The patient's friend reports he has been experiencing this shortness of breath for about 4 days. The patient's friend states the patient was seen here a few weeks ago for a bleeding ulcer in his lower intestine. Upon review of his medical history on 05/12/2018 the patient was in the hospital after experiencing some maroon colored bloody stools and a syncopal episode. At this point, his baseline hemoglobin was 9-10, but had a hemoglobin of 5.7 so he was transfused and had an EGD which found a bleeding ulcer. He was then given 4 units of packed RBCs while in hospital and continued on Protonix at discharge. On 05/21 the patient had a hemoglobin of 7 and on 05/22 he had a hemoglobin of 7.5. His history also shows he was found to have acute kidney injury, and his previous baseline creatinine was 1.4. The patient denies any fever, coughing, chest pain, blood in his stool, or abdominal pain.       Allergies:  History reviewed, no known allergies.     Medications:    Norvasc  Aspirin  Cyanocobalamin  Donepezil HCl  Proscar  Apresoline  Keppra  Levothyroxine  Memantine  Protonix  Seroquel     Past Medical History:    Alzheimer disease  BPH  Bradycardia  High cholesterol  PAD  Seizure  Thyroid disease    Past Surgical History:    Cardiac Stent placement     Family History:     History reviewed. No pertinent family history.     Social History:  The patient was accompanied to the ED by his friend.  Smoking Status: former smoker  Smokeless Tobacco: Never Used  Alcohol Use: Negative  Marital Status:        Review of Systems   Constitutional: Positive for fever.   Respiratory: Positive for shortness of breath. Negative for cough.    Cardiovascular: Positive for leg swelling. Negative for chest pain.        Low hemoglobin and other lab values   Gastrointestinal: Negative for abdominal pain and blood in stool.   All other systems reviewed and are negative.    Physical Exam     Patient Vitals for the past 24 hrs:   BP Temp Temp src Pulse Heart Rate Resp SpO2   05/27/18 1700 151/81 - - - 90 25 94 %   05/27/18 1654 150/82 99.7  F (37.6  C) Temporal - 96 17 93 %   05/27/18 1645 159/83 - - - 92 26 94 %   05/27/18 1638 149/85 - - - 86 20 92 %   05/27/18 1636 149/85 98.1  F (36.7  C) Oral - 80 22 92 %   05/27/18 1630 144/81 - - - 92 16 94 %   05/27/18 1615 143/80 - - - 90 (!) 37 94 %   05/27/18 1600 143/71 - - - 85 (!) 33 92 %   05/27/18 1545 141/76 - - - 93 - 92 %   05/27/18 1515 142/83 - - - 80 21 93 %   05/27/18 1500 - - - - 82 26 94 %   05/27/18 1440 132/75 98  F (36.7  C) Oral 86 - 24 (!) 85 %     Physical Exam   Nursing note and vitals reviewed.  Constitutional:  Appears well-developed and well-nourished.   HENT:   Head:    Atraumatic.   Mouth/Throat:   Oropharynx is clear and moist. No oropharyngeal exudate.   Eyes:    Pupils are equal, round, and reactive to light. Conjunctiva pale  Neck:    Normal range of motion. Neck supple.      No tracheal deviation present. No thyromegaly present.   Cardiovascular:  Normal rate, regular rhythm, no murmur   Pulmonary/Chest: Breath sounds diminished bilaterally with few crackles in the right base and equal without wheezes.  Abdominal:   Soft. Bowel sounds are normal. Exhibits no distension and      no mass. There is no tenderness.      There is no  rebound and no guarding.   Musculoskeletal:  +2 pre-tibial pitting edema bilaterally  Lymphadenopathy:  No cervical adenopathy.   Neurological:   Alert and oriented to person, place, and time.   Skin:    Skin is warm and dry. No rash noted. Pale    Emergency Department Course     ECG:  ECG taken at 1517, ECG read at 1522  Sinus rhythm with 1st degree AV block with premature atrial complexes with aberrant conduction  Nonspecific ST and T wave abnormality  Abnormal ECG  Rate 82 bpm. RI interval 266 ms. QRS duration 96 ms. QT/QTc 406/474 ms. P-R-T axes 67 6 60.    Imaging:  Radiology findings were communicated with the patient who voiced understanding of the findings.    Chest XR,  PA & LAT  Left perihilar and right perihilar and peripheral patchy  infiltrates. Bilateral pleural effusions, seen best on the lateral  view. Heart size difficult to evaluate due to rotation of the patient.  Vascular calcifications are seen.  Reading per radiology     Laboratory:  Laboratory findings were communicated with the patient who voiced understanding of the findings.    ABO/Rh type and screen: ABO: A, RH(D) negative,   CBC: WBC 15.3 (H), HGB 6.3 (LL),   BMP:  (H), chloride 113 (H), glucose 145 (H), BUN 59 (H), GFR 13 (L), calcium 8.1 (L) o/w WNL (Creatinine 4.37(H))  Troponin (Collected 1458): 0.066 (H)  BNP: 32700 (H)  INR: 1.15 (H)  Lactic Acid (Resulted: 1510): 2.9  Blood culture: pending    Interventions:  1548 NS 1000mL IV  1611 Protonix 40mg IV  1704 Zosyn 3.375 g IV    Emergency Department Course:    1434 Nursing notes and vitals reviewed.    1440 I performed an exam of the patient as documented above.     1458 IV was inserted and blood was drawn for laboratory testing, results above.    1620  I spoke with Dr. Traore of the Hospitalist service regarding patient's presentation, findings, and plan of care.    1623 I spoke with Dr. Alcantar of the GI service regarding patient's presentation, findings, and plan of  care.    1725 I personally reviewed the imaging and laboratory results with the patient and answered all related questions prior to admission.    Impression & Plan      Medical Decision Making:  Yuriy Ghosh is a 89 year old male who presents to the emergency department today for evaluation of shortness of breath and I found him to be significantly anemic with a hemoglobin of 6.3, so I ordered blood transfusion of one unit of packed RBC. The patient was also found to have signs and symptoms of acute congestive heart failure and likely hospital acquired pneumonia, as well as an elevated lactic acid of 2.9 and acute renal injury. Because of a concern for congestive heart failure and the requirement to transfuse him, packed RBCs, which could worsen heart failure, he was not given IV fluid hydration in order to prevent worsening CHF. The hospitalist Dr. Knight agrees with this management and states he will follow the patient's lactic acid levels during the hospitalization. He has a slightly elevated troponin level which was felt to be due to his hypoxia and anemia and not likely due to the acute coronary syndrome.  He was given Zosyn IV for treatment for healthcare associated pneumonia. We felt this was unlikely due to be staph so vancomycin was not administered at this time especially considering that this could worsen his kidney function further. The patient was admitted to the medical unit .    Diagnosis:    ICD-10-CM    1. Hospital-acquired pneumonia J18.9    2. Anemia due to blood loss, acute D62    3. Acute systolic congestive heart failure (H) I50.21    4. Acute renal injury (H) N17.9      Disposition:   The patient is admitted into the care of Dr. Knight.    Critical Care time was 55 minutes for this patient excluding procedures.     CMS Diagnosis:  Severe sepsis    CMS Diagnoses:   The patient has signs of Severe Sepsis as evidenced by:    1. 2 SIRS criteria, AND  2. Suspected infection, AND   3. Organ  dysfunction: Lactic Acid > 1.9    Time severe sepsis diagnosis confirmed = 1510 as this was the time when Lactate resulted, and the level was > 1.9      3 Hour Severe Sepsis Bundle Completion:  1. Initial Lactic Acid Result:   Recent Labs   Lab Test  05/27/18   1810  05/27/18   1458   LACT  1.0  2.9*     2. Blood Cultures before Antibiotics: Yes  3. Broad Spectrum Antibiotics Administered: Yes     Anti-infectives (Future)    Start     Dose/Rate Route Frequency Ordered Stop    05/27/18 2300  piperacillin-tazobactam (ZOSYN) 2.25 g vial to attach to  ml bag      2.25 g  over 30 Minutes Intravenous EVERY 6 HOURS 05/27/18 0630          4. Full 30mL/kg bolus not administered due to CHF and acute Pulmonary Edema  Ideal body weight: 66.1 kg (145 lb 11.6 oz)  CHF and requirement for blood transfusion where he would be given volumes that would worsen his CHF  Severe Sepsis reassessment:  1. Repeat Lactic Acid Level: done by hospitalist  2. MAP>65 after initial IVF bolus, will continue to monitor fluid status and vital signs        Scribe Disclosure:  Charisma SWANSON, am serving as a scribe at 2:42 PM on 5/27/2018 to document services personally performed by Debora Galvez MD based on my observations and the provider's statements to me.     EMERGENCY DEPARTMENT       Debora Galvez MD  05/27/18 0331

## 2018-05-27 NOTE — IP AVS SNAPSHOT
` [place] [address] ONCOLOGY: [phone]            Medication Administration Report for Yuriy Ghosh as of 06/01/18 1632   Legend:    Given Hold Not Given Due Canceled Entry Other Actions    Time Time (Time) Time  Time-Action       Inactive    Active    Linked        Medications 05/26/18 05/27/18 05/28/18 05/29/18 05/30/18 05/31/18 06/01/18    acetaminophen (TYLENOL) Suppository 650 mg  Dose: 650 mg  Freq: EVERY 4 HOURS PRN Route: RE  PRN Reason: mild pain  Start: 05/27/18 1744   Admin Instructions: Alternate ibuprofen (if ordered) with acetaminophen.  Maximum acetaminophen dose from all sources = 75 mg/kg/day not to exceed 4 grams/day.    Admin. Amount: 1 suppository (1 × 650 mg suppository)  Dispense Loc: [facility]               acetaminophen (TYLENOL) tablet 650 mg  Dose: 650 mg  Freq: EVERY 4 HOURS PRN Route: PO  PRN Reason: mild pain  Start: 05/27/18 1744   Admin Instructions: Alternate ibuprofen (if ordered) with acetaminophen.  Maximum acetaminophen dose from all sources = 75 mg/kg/day not to exceed 4 grams/day.    Admin. Amount: 2 tablet (2 × 325 mg tablet)  Last Admin: 05/30/18 0432  Dispense Loc: [facility]         0432 (650 mg)-Given             albuterol neb solution 2.5 mg  Dose: 3 mL  Freq: EVERY 2 HOURS PRN Route: NEBULIZATION  PRN Reasons: wheezing,shortness of breath / dyspnea  Start: 05/27/18 1744   Admin. Amount: 2.5 mg = 3 mL Conc: 2.5 mg/3 mL  Last Admin: 06/01/18 0746  Dispense Loc: [facility]  Volume: 3 mL      1943 (2.5 mg)-Given            0001 (2.5 mg)-Given       0746 (2.5 mg)-Given           artificial saliva (BIOTENE DRY MOUTHWASH) liquid 15 mL  Dose: 15 mL  Freq: 4 TIMES DAILY PRN Route: SWISH & SPIT  PRN Reason: dry mouth  Start: 05/29/18 0200   Admin. Amount: 15 mL  Dispense Loc:  Main Pharmacy  Volume: 237 mL               atropine 1 % ophthalmic solution 1-2 drop  Dose: 1-2 drop  Freq: EVERY 1 HOUR PRN Route:   PRN Reason: other  PRN Comment:  secretions  Start: 05/29/18 1457   Admin. Amount: 1-2 drop  Dispense Loc: [facility]  Volume: 2 mL               bisacodyl (DULCOLAX) Suppository 10 mg  Dose: 10 mg  Freq: DAILY PRN Route: RE  PRN Reason: constipation  Start: 05/27/18 1744   Admin Instructions: Hold for loose stools.  This is the third step of a three step constipation treatment.    Admin. Amount: 1 suppository (1 × 10 mg suppository)  Dispense Loc: [facility]               furosemide (LASIX) tablet 40 mg  Dose: 40 mg  Freq: DAILY Route: PO  Start: 05/30/18 0930   Admin. Amount: 1 tablet (1 × 40 mg tablet)  Last Admin: 06/01/18 0835  Dispense Loc: [place] [address]         1026 (40 mg)-Given        0854 (40 mg)-Given        0835 (40 mg)-Given           haloperidol (HALDOL) 2 MG/ML (HIGH CONC) solution 1-2 mg  Dose: 1-2 mg  Freq: EVERY 6 HOURS PRN Route: PO  PRN Reasons: agitation,other  PRN Comment: hallucinations, restlessness, delirium, nausea  Start: 05/29/18 1458   Admin. Amount: 1-2 mg = 0.5-1 mL Conc: 2 mg/mL  Dispense Loc: [facility]  Volume: 1 mL               HYDROmorphone (DILAUDID) (HIGH CONC) oral solution 2-4 mg  Dose: 2-4 mg  Freq: EVERY 2 HOURS PRN Route: SL  PRN Reasons: moderate to severe pain,other  PRN Comment: SOB, dyspnea, distress  Start: 05/29/18 1458   Admin Instructions: Injectable use ORALLY. CAUTION: Special high concentration formulation. Verify dose and volume before administration    Admin. Amount: 2-4 mg = 0.2-0.4 mL Conc: 4 mg/0.4 mL  Last Admin: 05/31/18 7371  Dispense Loc: [place] [address]  Volume: 0.4 mL        1628 (2 mg)-Given        0840 (2 mg)-Given       1340 (2 mg)-Given       2047 (2 mg)-Given        0201 (2 mg)-Given       2354 (2 mg)-Given            hypromellose-dextran (ARTIFICAL TEARS) 0.1-0.3 % ophthalmic solution 1 drop  Dose: 1 drop  Freq: EVERY 1 HOUR PRN Route: Both Eyes  PRN Reason: dry eyes  Start: 05/27/18 1952   Admin. Amount: 1 drop  Dispense Loc:  [facility] [address]  Volume: 15 mL                "levETIRAcetam (KEPPRA) tablet 250 mg  Dose: 250 mg  Freq: AT BEDTIME Route: PO  Start: 05/27/18 2200   Admin. Amount: 1 tablet (1 × 250 mg tablet)  Last Admin: 05/31/18 2123  Dispense Loc: [facility]      [ ] 2300        (2235)-Not Given        2151 (250 mg)-Given        (2102)-Not Given [C]        2123 (250 mg)-Given        [ ] 2200           lidocaine (LMX4) cream  Freq: EVERY 1 HOUR PRN Route: Top  PRN Reason: pain  PRN Comment: with VAD insertion or accessing implanted port.  Start: 05/27/18 1744   Admin Instructions: Do NOT give if patient has a history of allergy to any local anesthetic or any \"zoya\" product.   Apply 30 minutes prior to VAD insertion or port access.  MAX Dose:  2.5 g (  of 5 g tube)    Dispense Loc: [facility]               lidocaine 1 % 1 mL  Dose: 1 mL  Freq: EVERY 1 HOUR PRN Route: OTHER  PRN Comment: mild pain with VAD insertion or accessing implanted port  Start: 05/27/18 1744   Admin Instructions: Do NOT give if patient has a history of allergy to any local anesthetic or any \"zoya\" product. MAX dose 1 mL subcutaneous OR intradermal in divided doses.    Admin. Amount: 1 mL  Dispense Loc: [facility] FLOOR STOCK  Volume: 2 mL               LORazepam (ATIVAN) 1 mg/0.5 mL (HIGH CONC) solution 1 mg  Dose: 1 mg  Freq: EVERY 3 HOURS PRN Route: PO  PRN Reasons: anxiety,nausea,agitation  Start: 05/29/18 1459   Admin. Amount: 1 mg = 0.5 mL Conc: 2 mg/mL  Last Admin: 05/31/18 2123  Dispense Loc: [facility] [address]  Volume: 0.5 mL         0554 (1 mg)-Given        1736 (1 mg)-Given       2123 (1 mg)-Given            ondansetron (ZOFRAN-ODT) ODT tab 4 mg  Dose: 4 mg  Freq: EVERY 6 HOURS PRN Route: PO  PRN Reasons: nausea,vomiting  Start: 05/27/18 1744   Admin Instructions: This is Step 1 of nausea and vomiting management.  If nausea not resolved in 15 minutes, go to Step 2 prochlorperazine (COMPAZINE). Do not push through foil backing. Peel back foil and gently remove. Place on tongue immediately. " Administration with liquid unnecessary  With dry hands, peel back foil backing and gently remove tablet; do not push oral disintegrating tablet through foil backing; administer immediately on tongue and oral disintegrating tablet dissolves in seconds; then swallow with saliva; liquid not required.    Admin. Amount: 1 tablet (1 × 4 mg tablet)  Dispense Loc:  [facility]              Or  ondansetron (ZOFRAN) injection 4 mg  Dose: 4 mg  Freq: EVERY 6 HOURS PRN Route: IV  PRN Reasons: nausea,vomiting  Start: 05/27/18 1744   Admin Instructions: This is Step 1 of nausea and vomiting management.  If nausea not resolved in 15 minutes, go to Step 2 prochlorperazine (COMPAZINE).  Irritant. For ordered doses up to 4 mg, give IV Push undiluted over 2-5 minutes.    Admin. Amount: 4 mg = 2 mL Conc: 4 mg/2 mL  Dispense Loc:  [facility]  Infused Over: 2-5 Minutes  Volume: 2 mL               Patient may continue current oral medications  Freq: CONTINUOUS PRN Route: XX  Start: 05/28/18 0235   Dispense Loc: [facility] Floor Stock               Patient may continue current oral medications  Freq: CONTINUOUS PRN Route: XX  Start: 05/27/18 1955   Dispense Loc: [facility] Floor Stock               polyethylene glycol (MIRALAX/GLYCOLAX) Packet 17 g  Dose: 17 g  Freq: DAILY PRN Route: PO  PRN Reason: constipation  Start: 05/27/18 1744   Admin Instructions: Give in 8oz of  water, juice, or soda. Hold for loose stools.  This is the second step of a three step constipation treatment.  1 Packet = 17 grams. Mixed prescribed dose in 8 ounces of water. Follow with 8 oz. of water.    Admin. Amount: 17 g  Dispense Loc:  [facility]               prochlorperazine (COMPAZINE) injection 5 mg  Dose: 5 mg  Freq: EVERY 6 HOURS PRN Route: IV  PRN Reasons: nausea,vomiting  Start: 05/27/18 1744   Admin Instructions: This is Step 2 of nausea and vomiting management. Give if nausea not resolved 15 minutes after giving ondansetron (ZOFRAN). If nausea not resolved in 15 minutes,  go to Step 3 metoclopramide (REGLAN), if ordered.  For ordered doses up to 10 mg, give IV Push undiluted. Each 5mg over 1 minute.    Admin. Amount: 5 mg = 1 mL Conc: 5 mg/mL  Dispense Loc:  [facility]  Infused Over: 1-2 Minutes  Volume: 1 mL              Or  prochlorperazine (COMPAZINE) tablet 5 mg  Dose: 5 mg  Freq: EVERY 6 HOURS PRN Route: PO  PRN Reason: vomiting  Start: 05/27/18 1744   Admin Instructions: This is Step 2 of nausea and vomiting management. Give if nausea not resolved 15 minutes after giving ondansetron (ZOFRAN). If nausea not resolved in 15 minutes, go to Step 3 metoclopramide (REGLAN), if ordered.    Admin. Amount: 1 tablet (1 × 5 mg tablet)  Dispense Loc:  [facility]              Or  prochlorperazine (COMPAZINE) Suppository 12.5 mg  Dose: 12.5 mg  Freq: EVERY 12 HOURS PRN Route: RE  PRN Reasons: nausea,vomiting  Start: 05/27/18 1744   Admin Instructions: This is Step 2 of nausea and vomiting management. Give if nausea not resolved 15 minutes after giving ondansetron (ZOFRAN). If nausea not resolved in 15 minutes, go to Step 3 metoclopramide (REGLAN), if ordered.    Admin. Amount: 0.5 suppository (0.5 × 25 mg suppository)  Dispense Loc:  Main Pharmacy               senna-docusate (SENOKOT-S;PERICOLACE) 8.6-50 MG per tablet 1 tablet  Dose: 1 tablet  Freq: 2 TIMES DAILY PRN Route: PO  PRN Reason: constipation  Start: 05/27/18 1744   Admin Instructions: If no bowel movement in 24 hours, increase to 2 tablets PO.  Hold for loose stools.  This is the first step of a three step constipation treatment.    Admin. Amount: 1 tablet  Dispense Loc:  [facility]              Or  senna-docusate (SENOKOT-S;PERICOLACE) 8.6-50 MG per tablet 2 tablet  Dose: 2 tablet  Freq: 2 TIMES DAILY PRN Route: PO  PRN Reason: constipation  Start: 05/27/18 1744   Admin Instructions: Hold for loose stools.  This is the first step of a three step constipation treatment.    Admin. Amount: 2 tablet  Dispense Loc:  [facility]               Discontinued Medications  Medications 05/26/18 05/27/18 05/28/18 05/29/18 05/30/18 05/31/18 06/01/18         Dose: 10 mg  Freq: AT BEDTIME Route: PO  Start: 05/27/18 2200   End: 05/30/18 0922   Admin. Amount: 1 tablet (1 × 10 mg tablet)  Last Admin: 05/29/18 2151  Dispense Loc:  [facility]      [ ] 2300        (2234)-Not Given        2151 (10 mg)-Given        0922-Med Discontinued           Dose: 5 mg  Freq: DAILY Route: PO  Start: 05/28/18 0900   End: 05/30/18 0923   Admin Instructions: *Do not handle tablets if you are pregnant*    Admin. Amount: 1 tablet (1 × 5 mg tablet)  Last Admin: 05/29/18 0900  Dispense Loc:  [facility]       1015 (5 mg)-Given        0900 (5 mg)-Given        0923-Med Discontinued  (0953)-Not Given               Dose: 20 mg  Freq: DAILY Route: IV  Start: 05/28/18 1200   End: 05/30/18 0922   Admin Instructions: For ordered doses up to 40 mg, give IV Push undiluted over 1-2 minutes.    Admin. Amount: 20 mg = 2 mL Conc: 10 mg/mL  Last Admin: 05/29/18 1114  Dispense Loc:  [facility]  Infused Over: 1-2 Minutes  Volume: 2 mL       1315 (20 mg)-Given        1114 (20 mg)-Given        0922-Med Discontinued  (0953)-Not Given               Dose: 0.2-0.4 mg  Freq: EVERY 4 HOURS PRN Route: IV  PRN Reason: other  PRN Comment: secretions  Start: 05/29/18 1457   End: 05/30/18 0922   Admin Instructions: For ordered doses up to 0.2 mg, give IV Push undiluted over 1-2 minutes.    Admin. Amount: 0.2-0.4 mg = 1-2 mL Conc: 0.2 mg/mL  Dispense Loc:  [facility]  Infused Over: 1-2 Minutes  Volume: 2 mL         0922-Med Discontinued           Dose: 125 mcg  Freq: EVERY MORNING BEFORE BREAKFAST Route: PO  Start: 05/28/18 0730   End: 05/30/18 0922   Admin. Amount: 1 tablet (1 × 25 mcg tablet) + 1 tablet (1 × 100 mcg tablet)  Last Admin: 05/30/18 2659  Dispense Loc: [facility]   Mixture Administration Information:   Medication Type Amount   levothyroxine 25 MCG TABS Medications 25 mcg   levothyroxine 100 MCG TABS  Medications 100 mcg               (0756)-Not Given       1015 (125 mcg)-Given        0901 (125 mcg)-Given        0740 (125 mcg)-Given       0922-Med Discontinued           Dose: 10 mg  Freq: 2 TIMES DAILY Route: PO  Start: 05/27/18 2100   End: 05/30/18 0922   Admin. Amount: 1 tablet (1 × 10 mg tablet)  Last Admin: 05/29/18 2045  Dispense Loc:  [facility]      [ ] 2300        1015 (10 mg)-Given       (2030)-Not Given        0859 (10 mg)-Given       2045 (10 mg)-Given        0922-Med Discontinued  (0953)-Not Given               Dose: 3 mL  Freq: EVERY 8 HOURS Route: IK  Start: 05/27/18 1745   End: 05/30/18 1344   Admin Instructions: And Q1H PRN, to lock peripheral IV dormant line.    Admin. Amount: 3 mL  Last Admin: 05/30/18 0051  Dispense Loc: [facility] Floor Stock  Volume: 3 mL      (1804)-Not Given        0504 (3 mL)-Given       1017 (3 mL)-Given       1816 (3 mL)-Given        0041 (3 mL)-Given              0924 (3 mL)-Given       1727 (3 mL)-Given        0051 (3 mL)-Given       (1342)-Not Given       1344-Med Discontinued           Dose: 3 mL  Freq: EVERY 1 HOUR PRN Route: IK  PRN Reason: line flush  PRN Comment: for peripheral IV flush post IV meds  Start: 05/27/18 1744   End: 05/30/18 1344   Admin. Amount: 3 mL  Last Admin: 05/28/18 1016  Dispense Loc: [facility] Floor Stock  Volume: 3 mL       1016 (3 mL)-Given         1344-Med Discontinued      Medications 05/26/18 05/27/18 05/28/18 05/29/18 05/30/18 05/31/18 06/01/18

## 2018-05-27 NOTE — PROGRESS NOTES
Admission    Patient arrives to room- on cart  Care plan note: Alert to self, baseline confusion.  RRT due to SOB and labored breathing, see note.  VSS, BiPap can switch to 5L mask.  Hgb 6.3,recieved 1 unit, next lab @ 2200.  IV SL.  LS wheezing in B/LL.  Lasix x 1, urinal at bedside.  Up with assist of 2, needs PT eval.  Seroquel x 1 for anxiety.  Wife at bedside.  Continue to monitor.     Inpatient nursing criteria listed below were met:    PCD's Documented: yes  Skin issues/needs documented :Yes  Isolation education started/completed NA  Patient allergies verified with patient: Yes  Fall Prevention: Care plan updated, Education given and documented Yes  Care Plan initiated: Yes   Home medications documented in belongings flowsheet: Yes  Patient belongings documented in belongings flowsheet: Yes  Reminder note (belongings/ medications) placed in discharge instructions:NA  Admission profile/ required documentation complete: Yes

## 2018-05-27 NOTE — PROGRESS NOTES
RECEIVING UNIT ED HANDOFF REVIEW    ED Nurse Handoff Report was reviewed by: Jordan Tian on May 27, 2018 at 5:13 PM

## 2018-05-27 NOTE — IP AVS SNAPSHOT
"          [place] [address] ONCOLOGY: [phone]                                              INTERAGENCY TRANSFER FORM - LAB / IMAGING / EKG / EMG RESULTS   2018                    Hospital Admission Date: 2018  SHY BELLA   : 1926  Sex: Male        Attending Provider: Kike Knight MD     Allergies:  No Known Allergies    Infection:  None   Service:  HOSPITALIST    Ht:  1.702 m (5' 7\")   Wt:  65.6 kg (144 lb 10 oz)   Admission Wt:  69.5 kg (153 lb 3.5 oz)    BMI:  22.65 kg/m 2   BSA:  1.76 m 2            Patient PCP Information     Provider PCP Type    Kal Guadalupe MD General         Lab Results - 3 Days      Blood culture [[id number]]  Resulted: 18 040, Result status: Preliminary result    Ordering provider: Debora Galvez MD  18 1452 Resulting lab: [facility]    Specimen Information    Type Source Collected On   Blood Arm, Right 18 1608   Comment:  Right Arm          Components       Value Reference Range Flag Lab   Specimen Description Blood Right Arm      Special Requests Aerobic and anaerobic bottles received   FrStHsLb   Culture Micro No growth after 5 days   75            Blood culture [[id number]]  Resulted: 18 0405, Result status: Preliminary result    Ordering provider: Debora Galvez MD  18 1452 Resulting lab: [facility]    Specimen Information    Type Source Collected On   Blood Arm, Right 18 1458   Comment:  Right Arm          Components       Value Reference Range Flag Lab   Specimen Description Blood Right Arm      Special Requests Aerobic and anaerobic bottles received   FrStHsLb   Culture Micro No growth after 5 days   75            Hemoglobin (Every 4 Hrs) [[id number]] (Abnormal)  Resulted: 18 1316, Result status: Final result    Ordering provider: Murali Real MD  18 0400 Resulting lab: [facility]    Specimen " Information    Type Source Collected On   Blood  05/29/18 1300          Components       Value Reference Range Flag Lab   Hemoglobin 9.4 13.3 - 17.7 g/dL L FrStHsLb            Basic metabolic panel [[id number]] (Abnormal)  Resulted: 05/29/18 0546, Result status: Final result    Ordering provider: Kelvin Amaya MD  05/29/18 0000 Resulting lab: [facility]    Specimen Information    Type Source Collected On   Blood  05/29/18 0510          Components       Value Reference Range Flag Lab   Sodium 149 133 - 144 mmol/L H FrStHsLb   Potassium 4.3 3.4 - 5.3 mmol/L  FrStHsLb   Chloride 118 94 - 109 mmol/L H FrStHsLb   Carbon Dioxide 23 20 - 32 mmol/L  FrStHsLb   Anion Gap 8 3 - 14 mmol/L  FrStHsLb   Glucose 90 70 - 99 mg/dL  FrStHsLb   Urea Nitrogen 71 7 - 30 mg/dL H FrStHsLb   Creatinine 4.60 0.66 - 1.25 mg/dL H FrStHsLb   GFR Estimate 12 >60 mL/min/1.7m2 L FrStHsLb   Comment:  Non  GFR Calc   GFR Estimate If Black 15 >60 mL/min/1.7m2 L FrStHsLb   Comment:  African American GFR Calc   Calcium 7.3 8.5 - 10.1 mg/dL L FrStHsLb            Phosphorus [[id number]] (Abnormal)  Resulted: 05/29/18 0546, Result status: Final result    Ordering provider: Kelvin Amaya MD  05/29/18 0000 Resulting lab: [facility]    Specimen Information    Type Source Collected On   Blood  05/29/18 0510          Components       Value Reference Range Flag Lab   Phosphorus 5.3 2.5 - 4.5 mg/dL H FrStHsLb            Magnesium [[id number]]  Resulted: 05/29/18 0546, Result status: Final result    Ordering provider: Kelvin Amaya MD  05/29/18 0000 Resulting lab: [facility]    Specimen Information    Type Source Collected On   Blood  05/29/18 0510          Components       Value Reference Range Flag Lab   Magnesium 2.1 1.6 - 2.3 mg/dL  FrStHsLb            Hemoglobin (Every 4 Hrs) [[id number]] (Abnormal)  Resulted: 05/29/18 0527, Result status: Final result    Ordering  provider: Murali Real MD  05/29/18 0000 Resulting lab: [facility]    Specimen Information    Type Source Collected On   Blood  05/29/18 0510          Components       Value Reference Range Flag Lab   Hemoglobin 7.0 13.3 - 17.7 g/dL L FrStHsLb            Testing Performed By     Lab - Abbreviation Name Director Address Valid Date Range    14 - FrStHsLb [facility] Unknown [address] [place] [address] 05/08/15 1057 - Present    75 - Unknown [facility] Unknown [address] [address] 01/15/15 1019 - Present            Unresulted Labs     None      Encounter-Level Documents:     There are no encounter-level documents.      Order-Level Documents:     There are no order-level documents.

## 2018-05-27 NOTE — ED NOTES
[facility]  ED Nurse Handoff Report    ED Chief complaint: Shortness of Breath (x4 days per S.O's report. EMS called per NH for abnormal labs)      ED Diagnosis:   Final diagnoses:   SOB (shortness of breath)       Code Status: DNR / DNI    Allergies: No Known Allergies    Activity level - Baseline/Home:  Stand with Assist of 2    Activity Level - Current:   Unable to Assess     Needed?: No    Isolation: No  Infection: Not Applicable    Bariatric?: No    Vital Signs:   Vitals:    05/27/18 1615 05/27/18 1630 05/27/18 1636 05/27/18 1638   BP: 143/80 144/81 149/85 149/85   Pulse:       Resp: (!) 37 16 22 20   Temp:   98.1  F (36.7  C)    TempSrc:   Oral    SpO2: 94% 94% 92% 92%       Cardiac Rhythm: ,        Pain level:      Is this patient confused?: Yes   Suicidality: Screened negative    Patient Report: Initial Complaint: SOB   Focused Assessment: Pt w/ hx of alzheimers disease, BPH, PAD bradycardia. Pt resident at [facility] and has been having SOB for past 4 days. EMS was called today d/t abnormal labs drawn 4 days ago. Pt reports increase of SOB and RA saturations low 90s. Pt was seen in the ED in the beginning of may and was dx with bleeding ulcer and received 4 u of PRBCs and sent to [facility]. Pt has bilat lower extremity swelling and creatinine has been increased. Pt confused and repeats questions. Pt SO at bedside.   Tests Performed: labs cxr  Abnormal Results: bnp elevated, hgb 6.3 creat elevated  Treatments provided: PRBC. Protonix    Family Comments: SO at bedside    OBS brochure/video discussed/provided to patient: N/A    ED Medications:   Medications   0.9% sodium chloride BOLUS (1,000 mLs Intravenous New Bag 5/27/18 1549)   piperacillin-tazobactam (ZOSYN) 3.375 g vial to attach to  mL bag (not administered)   pantoprazole (PROTONIX) 40 mg IV push injection (40 mg Intravenous Given 5/27/18 1611)       Drips infusing?:  Yes    For the majority of the shift  this patient was Green.   Interventions performed were .    Severe Sepsis OR Septic Shock Diagnosis Present: No      ED NURSE PHONE NUMBER: [phone]

## 2018-05-27 NOTE — IP AVS SNAPSHOT
"` `           [place] [address] ONCOLOGY: [phone]                                              INTERAGENCY TRANSFER FORM - NURSING   2018                    Hospital Admission Date: 2018  SHY BELLA   : 1926  Sex: Male        Attending Provider: Kike Knight MD     Allergies:  No Known Allergies    Infection:  None   Service:  HOSPITALIST    Ht:  1.702 m (5' 7\")   Wt:  65.6 kg (144 lb 10 oz)   Admission Wt:  69.5 kg (153 lb 3.5 oz)    BMI:  22.65 kg/m 2   BSA:  1.76 m 2            Patient PCP Information     Provider PCP Type    Kal Guadalupe MD General      Current Code Status     Date Active Code Status Order ID Comments User Context       Prior      Code Status History     Date Active Date Inactive Code Status Order ID Comments User Context    2018  3:41 PM  DNR/DNI [id number]  Coral Mitchell MD Outpatient    2018 12:35 PM 2018  3:41 PM DNR/DNI [id number]  Coral Mitchell MD Outpatient    2018  5:44 PM 2018 12:35 PM DNR/DNI [id number]  Kike Knight MD Inpatient    2018  5:45 PM 2018  5:44 PM DNR/DNI [id number]  Kike Knight MD Outpatient    2018  8:07 PM 2018  5:45 PM DNR/DNI [id number]  Shakir Erickson MD Inpatient      Advance Directives        Scanned docmt in ACP Activity?           No scanned doc        Hospital Problems as of 2018              Priority Class Noted POA    Acute respiratory failure with hypoxia (H) Medium  2018 Yes      Non-Hospital Problems as of 2018              Priority Class Noted    Fall Medium  2018    Acute renal failure (ARF) (H) Medium  5/3/2018    ACP (advance care planning) Medium  2018      Immunizations     None         END      ASSESSMENT     Discharge Profile Flowsheet     EXPECTED DISCHARGE     Patient's communication style  spoken language (English or Bilingual) 18 1436    Expected Discharge Date  -- (LTC placement with hospice) 18 1020   FINAL RESOURCES   " "   DISCHARGE NEEDS ASSESSMENT     Resources List  -- 06/01/18 1000    Concerns To Be Addressed  -- 06/01/18 1000   Other Resources  -- 06/01/18 1000    Patient/family verbalizes understanding of discharge plan recommendations?  -- 06/01/18 1000   Transportation Agency  -- 06/01/18 1000    Medical Team notified of plan?  -- 06/01/18 1000   Transportation Contact Info  -- 06/01/18 1000    Equipment Currently Used at Home  walker, rolling 05/03/18 0946   Transportation Status  -- 06/01/18 1000    # of Referrals Placed by CTS  External Care Coordination;Scheduled Follow-up appointments;Communication hand-offs to next level of Care Providers 05/15/18 1203   Geriatric Psych  -- 06/01/18 1000    [facility]  05/15/18 1203   SKIN      Primary Care MD Name  Dr. Guadalupe 05/15/18 1203   Inspection of bony prominences  Full 06/01/18 1511    GASTROINTESTINAL (ADULT,PEDIATRIC,OB)     Inspection under devices  Full 05/30/18 1634    GI WDL  ex 06/01/18 1511   Skin WDL  ex 05/30/18 1634    Abdominal Appearance  flat 05/28/18 2244   Skin Color/Characteristics  pale;bruised (ecchymotic) 06/01/18 1511    All Quadrants Bowel Sounds  hyperactive 05/29/18 1435   Skin Integrity  wound(s) (r knee growth covered) 06/01/18 1511    Last Bowel Movement  05/30/18 05/30/18 1634   Skin Moisture  flaky;dry 06/01/18 1511    GI Signs/Symptoms  fecal incontinence 06/01/18 1511   SAFETY      Passing flatus  yes 05/31/18 2148   Safety WDL  WDL 06/01/18 0723    COMMUNICATION ASSESSMENT     All Alarms  alarm(s) activated and audible 06/01/18 0723                 Assessment WDL (Within Defined Limits) Definitions           Safety WDL     Effective: 09/28/15    Row Information: <b>WDL Definition:</b> Bed in low position, wheels locked; call light in reach; upper side rails up x 2; ID band on<br> <font color=\"gray\"><i>Item=AS safety wdl>>List=AS safety wdl>>Version=F14</i></font>      Skin WDL     Effective: 09/28/15    Row " "Information: <b>WDL Definition:</b> Warm; dry; intact; elastic; without discoloration; pressure points without redness<br> <font color=\"gray\"><i>Item=AS skin wdl>>List=AS skin wdl>>Version=F14</i></font>      Vitals     Vital Signs Flowsheet     VITAL SIGNS     Pain Intervention(s)  Medication (See eMAR) 05/30/18 1003    Temp  100.3  F (37.9  C) (per significant other request) 05/30/18 0444   Response to Interventions  Absence of nonverbal indicators of pain 05/31/18 0250    Temp src  Oral 05/29/18 1621   ANALGESIA SIDE EFFECTS MONITORING      Resp  22 06/01/18 0255   Side Effects Monitoring: Respiratory Quality  R 06/01/18 0020    Pulse  82 05/29/18 1113   Side Effects Monitoring: Respiratory Depth  N 06/01/18 0020    Heart Rate  89 05/29/18 1544   Side Effects Monitoring: Sedation Level  3 06/01/18 0020    Pulse/Heart Rate Source  Monitor 05/28/18 0030   HEIGHT AND WEIGHT      BP  (!)  132/94 05/29/18 1544   Weight  65.6 kg (144 lb 10 oz) 05/29/18 0608    BP Location  Left arm 05/29/18 1316   Weight Method  Standing scale 05/29/18 0608    OXYGEN THERAPY     EKG MONITORING      SpO2  97 % 06/01/18 0748   Cardiac Regularity  Irregular 05/27/18 1504    O2 Device  Nasal cannula 06/01/18 0748   POSITIONING      FiO2 (%)  40 % 05/28/18 2239   Body Position  side-lying, left 06/01/18 1436    Oxygen Delivery  4 LPM 06/01/18 0748   Head of Bed (HOB)  HOB at 20-30 degrees 06/01/18 1436    PAIN/COMFORT     Positioning/Transfer Devices  pillows;in use 06/01/18 1436    Patient Currently in Pain  denies 06/01/18 1503   Chair  Shifted weight 05/30/18 0621    Preferred Pain Scale  -- 05/30/18 2111   DAILY CARE      Patient's Stated Pain Goal  No pain 05/28/18 0845   Activity Management  activity adjusted per tolerance 06/01/18 0721    0-10 Pain Scale  0 05/28/18 0845   Activity Assistance Provided  assistance, 2 people 06/01/18 0721    Pain Location  Generalized 05/27/18 1934   Assistive Device Utilized  mechanical lift 06/01/18 " 0020    Associated Signs/Symptoms  tachypnea 05/30/18 2111   Additional Documentation  Activity Device Assistance (Row) 05/30/18 1634            Patient Lines/Drains/Airways Status    Active LINES/DRAINS/AIRWAYS     Name: Placement date: Placement time: Site: Days: Last dressing change:    Wound 05/02/18 Posterior Elbow Skin tear dime size skin tear from fall 05/02/18   1959   Elbow   29     Wound 05/03/18 Posterior Penis Ulceration;Other (comment) Lesion to underside of penis, cauliflower texture 05/03/18   1530   Penis   29             Patient Lines/Drains/Airways Status    Active PICC/CVC     None            Intake/Output Detail Report     Date Intake         Output Net    Shift P.O. I.V. Other IV Piggyback Blood Components Total Urine Total       Day 05/31/18 0700 - 05/31/18 1459 -- -- -- -- -- -- -- -- 0    Sharifa 05/31/18 1500 - 05/31/18 2259 -- -- -- -- -- -- -- -- 0    Noc 05/31/18 2300 - 06/01/18 0659 30 -- -- -- -- 30 -- -- 30    Day 06/01/18 0700 - 06/01/18 1459 -- -- -- -- -- -- -- -- 0    Sharifa 06/01/18 1500 - 06/01/18 2259 -- -- -- -- -- -- -- -- 0      Last Void/BM       Most Recent Value    Urine Occurrence 1 at 06/01/2018 1630    Stool Occurrence 1 at 05/31/2018 2256      Case Management/Discharge Planning     Case Management/Discharge Planning Flowsheet     REFERRAL INFORMATION     Major Change/Loss/Stressor  hospitalization 05/27/18 2139    Did the Initial Social Work Assessment result in a Social Work Case?  Yes 05/30/18 1500   EXPECTED DISCHARGE      Admission Type  inpatient 05/30/18 1500   Expected Discharge Date  -- (LTC placement with hospice) 05/30/18 1020    Arrived From  nursing facility 05/30/18 1507   ASSESSMENT/CONCERNS TO BE ADDRESSED      Referral Source  interdisciplinary rounds;physician 05/30/18 1507   Concerns To Be Addressed  -- 06/01/18 1000    # of Referrals Placed by [facility]  External Care Coordination;Scheduled Follow-up appointments;Communication hand-offs to next level of Care  Providers 05/15/18 1203   DISCHARGE PLANNING      Reason For Consult  discharge planning 05/30/18 1507   Patient/family verbalizes understanding of discharge plan recommendations?  -- 06/01/18 1000    Record Reviewed  history and physical;patient profile 05/30/18 1507   Medical Team notified of plan?  -- 06/01/18 1000    CTS Assigned to Case  -- 06/01/18 1000   FINAL RESOURCES      [facility]  05/15/18 1203   Equipment Currently Used at Home  walker, rolling 05/03/18 0946    Primary Care MD Name  Dr. Guadalupe 05/15/18 1203   Resources List  -- 06/01/18 1000    LIVING ENVIRONMENT     Other Resources  -- 06/01/18 1000    Lives With  significant other 05/30/18 1507   Transportation Agency  -- 06/01/18 1000    Living Arrangements  house 05/30/18 1507   Transportation Contact Info  -- 06/01/18 1000    Provides Primary Care For  no one, unable/limited ability to care for self 05/30/18 1507   Transportation Status  -- 06/01/18 1000    Quality Of Family Relationships  supportive;involved 05/30/18 1507   Geriatric Psych  -- 06/01/18 1000    Able to Return to Prior Living Arrangements  no 05/30/18 1507   ABUSE RISK SCREEN      ASSESSMENT OF FAMILY/SOCIAL SUPPORT     QUESTION TO PATIENT:  Has a member of your family or a partner(now or in the past) intimidated, hurt, manipulated, or controlled you in any way?  no 05/27/18 1449    Who is your support system?  Significant Other 05/30/18 1507   QUESTION TO PATIENT: Do you feel safe going back to the place where you are living?  yes 05/27/18 1449    Significant Other's Name  Eva 05/30/18 1507   OBSERVATION: Is there reason to believe there has been maltreatment of a vulnerable adult (ie. Physical/Sexual/Emotional abuse, self neglect, lack of adequate food, shelter, medical care, or financial exploitation)?  no 05/27/18 1449    Description of Support System  Supportive;Involved 05/30/18 1507   OTHER      Support Assessment  Adequate family and  caregiver support;Adequate social supports 05/30/18 1507   Are you depressed or being treated for depression?  No 05/28/18 0734    Communication preferences  phone 05/30/18 1506   HOMICIDE RISK      COPING/STRESS     Feels Like Hurting Others  no 05/27/18 1444

## 2018-05-27 NOTE — H&P
[facility]    History and Physical  Hospitalist       Date of Admission:  5/27/2018    Assessment & Plan   Yuriy Ghosh is a 89 year old male with past history HTN, PAD, hypothyroid, seizure disorder, BPH, CKD, CHF, recent hospitalization complicated by GI bleed due to duodenal ulcer who presents with shortness of breath.    Acute hypoxic respiratory failure  Hypoxia to 84% on room air upon arrival.  Suspect multifactorial including possible pneumonia (new leukocytosis with left shift, though afebrile, lactate of 2.9) and acute on chronic CHF (pro-BNP >48K with marked lower extremity edema).  CXR showing bilateral patchy opacities.  - wean oxygen as able  - continue zosyn (holding on vanco due to concern for renal toxicity)  - check procalcitonin  - repeat lactic acid  - no diuretics at this time; he is in a very difficult situation due to his poor renal function; may need to consider hospice discussion    Acute on chronic systolic CHF  Moderate AS  Pulmonary HTN  TTE 5/4/18 with EF 45-50% with global hypokinesia, 1-2+ MR, 1-2+ TR, moderate AS, pulmonary HTN.  Not on BB due to intolerance (bradycardia), no ACE/ARB due to renal function.  - trop minimally elevated, suspect due to demand and will trend  - diurese as able  - continue aspirin    CKD stage IV  Evaluated by Nephrology during last admission, suspect progression of CKD due to HTN and vascular disease.  Admission Cr 4.3, which is largely stable from recent discharge.  - monitor BMP  - due for Nephrology follow up, will consult    Hx of GIB with anemia  Experienced GIB due to duodenal ulcer on 5/12/18.  Discharge hgb 7.8, admission hgb 6.3.  No known melena or hematochezia.  - transfuse 1 unit PRBC, repeat hgb this evening and tomorrow AM  - hold on GI consult (Katharine) unless signs of active bleeding    HTN  - continue PTA amlodipine and hydralazine    Hypothyroid  - continue PTA levothyroxine    Seizure disorder  - continue PTA  "Keppra    Dementia  - continue PTA donepezil and memantine  - prn seroquel for agitation    BPH  - continue PTA finasteride      DVT Prophylaxis: Pneumatic Compression Devices  Code Status: DNR / DNI; this was discussed with significant other    # Pain Assessment:  Current Pain Score 5/27/2018   Patient currently in pain? denies   Pain score (0-10) -   Yuriy knapp pain level was assessed and he currently denies pain.      Disposition: Expected discharge in 3 days once resp failure improved.    Kike Knight    Primary Care Physician   Kal Guadalupe    Chief Complaint   Shortness of breath    History is obtained from the patient, his significant other, and chart review    History of Present Illness   Yuriy Ghosh is a 89 year old male who presents with shortness of breath.  Patient was recently hospitalized at [facility], having been discharged to nursing home on 5/15.  He was noted to have renal failure, unclear if chronic versus acute component in addition to GI bleed secondary to duodenal ulcer.  He presents today secondary to complaints of shortness of breath.  His significant other reports he has been complaining of dyspnea and chest heaviness over the past 4 days.  There have been no documented fevers at his nursing facility per her report.  She has noted no cough.  She reports he has somewhat poor appetite and complained of lightheadedness a day ago.  Patient currently reports he is \"feeling fine.\"  He denies any shortness of breath, chest pain or pressure, lightheadedness, nausea, diarrhea, abdominal pain, dysuria or fevers or chills.  He denies any pain complaints.  And his remainder of review of systems is otherwise negative.    Past Medical History    Hypertension  Peripheral arterial disease  Hypothyroidism  Seizure disorder  Hyperlipidemia  BPH  Dementia  Depression  Anxiety  Diverticulosis  Moderate aortic stenosis  Chronic kidney disease stage IV  Recent GI bleed secondary to duodenal " ulcer    Past Surgical History   AAA repair with endovascular stent  Tonsillectomy and adenoidectomy  Right hernia repair  Hemorrhoidectomy  Knee arthroscopy    Prior to Admission Medications   Prior to Admission Medications   Prescriptions Last Dose Informant Patient Reported? Taking?   CYANOCOBALAMIN PO 5/27/2018 at AM Nursing Home Yes Yes   Sig: Take 2,500 mcg by mouth daily   DONEPEZIL HCL PO 5/26/2018 at PM Nursing Home Yes Yes   Sig: Take 10 mg by mouth At Bedtime   FOLIC ACID PO 5/27/2018 at AM Nursing Home Yes Yes   Sig: Take 400 mcg by mouth daily   LEVOTHYROXINE SODIUM PO 5/27/2018 at AM Nursing Home Yes Yes   Sig: Take 125 mcg by mouth daily   LevETIRAcetam (KEPPRA PO) 5/26/2018 at PM Nursing Home Yes Yes   Sig: Take 250 mg by mouth At Bedtime   MEMANTINE HCL PO 5/27/2018 at AM Nursing Home Yes Yes   Sig: Take 10 mg by mouth 2 times daily   VITAMIN D, CHOLECALCIFEROL, PO 5/27/2018 at AM Nursing Home Yes Yes   Sig: Take 1,000 Units by mouth daily   acetaminophen (TYLENOL) 325 MG tablet PRN med Nursing Home No Yes   Sig: Take 2 tablets (650 mg) by mouth every 4 hours as needed for mild pain or fever   amLODIPine (NORVASC) 10 MG tablet 5/27/2018 at AM Nursing Home No Yes   Sig: Take 1 tablet (10 mg) by mouth daily   aspirin 81 MG chewable tablet on HOLD as of 5/22 Nursing Home Yes No   Sig: Take 81 mg by mouth daily   calcium citrate-vitamin D (CITRACAL) 315-250 MG-UNIT TABS per tablet 5/27/2018 at AM Nursing Home Yes Yes   Sig: Take 1 tablet by mouth 2 times daily   finasteride (PROSCAR) 5 MG tablet 5/27/2018 at AM Nursing Home No Yes   Sig: Take 1 tablet (5 mg) by mouth daily   hydrALAZINE (APRESOLINE) 50 MG tablet 5/27/2018 at AM Nursing Home No Yes   Sig: Take 1 tablet (50 mg) by mouth 3 times daily   pantoprazole (PROTONIX) 40 MG EC tablet 5/27/2018 at AM Nursing Home No Yes   Sig: Take 1 tablet (40 mg) by mouth every morning      Facility-Administered Medications: None     Allergies   No Known  Allergies    Social History   I have personally reviewed the social history with the patient showing no tobacco or alcohol use.  He was currently residing at [facility].    Family History   Reviewed and noncontributory.      Review of Systems   The 10 point Review of Systems is negative other than noted in the HPI or here.     Physical Exam   Temp: 99.7  F (37.6  C) Temp src: Temporal BP: 151/81 Pulse: 86 Heart Rate: 90 Resp: 25 SpO2: 94 % O2 Device: Nasal cannula Oxygen Delivery: 5 LPM  Vital Signs with Ranges  Temp:  [98  F (36.7  C)-99.7  F (37.6  C)] 99.7  F (37.6  C)  Pulse:  [86] 86  Heart Rate:  [80-96] 90  Resp:  [16-37] 25  BP: (132-159)/(71-85) 151/81  SpO2:  [85 %-94 %] 94 %  0 lbs 0 oz    Constitutional: well developed, well nourished male in no acute distress  Eyes: pupils equal, round and reactive to light, no icterus  HEENT: head normocephalic, atraumatic, mucous membranes moist, no cervical lymphadenopathy  Respiratory: anterior crackles, mildly diminished bibasilar lung sounds, no wheezing or tachypnea  Cardiovascular: regular rate and rhythm, normal S1/S2, with grade 2/6 systolic murmur  GI: abdomen soft, non-tender, non-distended, normal bowel sounds, no hepatosplenomegaly  Lymph/Hematologic: 3+ lower extremity edema  Skin: No rash or bruising  Musculoskeletal: Extremities warm and well perfused  Neurologic: alert, pleasantly confused, cranial nerves grossly intact  Psychiatric: normal affect    Data   Data reviewed today:  I personally reviewed the EKG tracing showing 1st degree AV block, diffuse T-wave flattening (unchanged from prior) and the chest x-ray image(s) showing diffuse patchy opacities.    Recent Labs  Lab 05/27/18  1458   WBC 15.3*   HGB 6.3*   MCV 90      INR 1.15*   *   POTASSIUM 4.6   CHLORIDE 113*   CO2 23   BUN 59*   CR 4.37*   ANIONGAP 9   TEMO 8.1*   *   TROPI 0.066*       Recent Results (from the past 24 hour(s))   Chest XR,  PA & LAT    Narrative     CHEST TWO VIEWS  5/27/2018 3:43 PM     HISTORY: Check for pneumonia, short of breath, cough and hypoxia.     COMPARISON: Images from prior exam 6/16/1998 are no longer available.  Report from prior study describes healing posterior left-sided rib  fracture.      Impression    IMPRESSION: Left perihilar and right perihilar and peripheral patchy  infiltrates. Bilateral pleural effusions, seen best on the lateral  view. Heart size difficult to evaluate due to rotation of the patient.  Vascular calcifications are seen.

## 2018-05-27 NOTE — ED NOTES
Bed: ED01  Expected date: 5/27/18  Expected time: 2:18 PM  Means of arrival: Ambulance  Comments:  419 89M SOB  ETA 1424

## 2018-05-27 NOTE — IP AVS SNAPSHOT
` `       Patient Information     Patient Name Sex     Yuriy Ghosh ([id number]) Male 1926       Room Bed    8800 8800-01      Patient Demographics     Address Phone    [address] [place] [address] [phone] (Home)  [phone] (Mobile) *Preferred*      Patient Ethnicity & Race     Ethnic Group Patient Race    American White      Emergency Contact(s)     Name Relation Home Work Mobile    Eva Hernandez Significant other [phone]        Documents on File        Status Date Received Description       Documents for the Patient    Face Sheet Received () 09     Affiliate Privacy placeholder   phase3    Consent for EHR Access Received 18     Insurance Card Received 18 MEDICARE    Patient ID Received 18     [facility] Specified Other       Privacy Notice - [place] Received 18     Consent for Services - Hospital and Clinic Received 18     HIE Auth Received 18     Insurance Card Received 18 BCBS    Power of  Not Received  Statutory Short Form Power of  2015       Documents for the Encounter    CMS IM for Patient Signature Received 18     EMS/Ambulance Record  18 [facility]    CMS IM for Patient Signature Received 18 [facility] REFUSED      Admission Information     Attending Provider Admitting Provider Admission Type Admission Date/Time    Kike Knight MD Bechard, Paul, MD Emergency 18  1434    Discharge Date Hospital Service [facility]Cert [facility] Room/Bed Admission Status        88 ONCOLOGY 8800/8800-01 Admission (Confirmed)       Admission     Complaint    Acute respiratory failure with hypoxia (H)      Hospital Account     Name Acct ID Class Status Primary Coverage    Yuriy Ghosh [id number] Inpatient Open MEDICARE - MEDICARE FOR HB SUPPLEMENT            Guarantor Account (for Hospital Account #[id number])     Name Relation to  Pt Service Area Active? Acct Type    Yuriy Ghosh Self FCS Yes Personal/Family    Address Phone          [address] [address] [phone](H)              Coverage Information (for Hospital Account #[id number])     1. MEDICARE/MEDICARE FOR HB SUPPLEMENT     F/O Payor/Plan Precert #    MEDICARE/MEDICARE FOR HB SUPPLEMENT     Subscriber Subscriber #    Yuriy Ghosh [id number]    Address Phone    [employer]  [address]  [place] [address] [phone]          2. BCBS/BCBS PLATINUM BLUE     F/O Payor/Plan Precert #    BCBS/BCBS PLATINUM BLUE     Subscriber Subscriber #    Yuriy Ghosh [id number]    Address Phone    [address] [address]  [place] [address] [phone]

## 2018-05-28 LAB
ANION GAP SERPL CALCULATED.3IONS-SCNC: 10 MMOL/L (ref 3–14)
BLD PROD TYP BPU: NORMAL
BLD UNIT ID BPU: 0
BLOOD PRODUCT CODE: NORMAL
BPU ID: NORMAL
BUN SERPL-MCNC: 59 MG/DL (ref 7–30)
CALCIUM SERPL-MCNC: 7.5 MG/DL (ref 8.5–10.1)
CHLORIDE SERPL-SCNC: 114 MMOL/L (ref 94–109)
CO2 SERPL-SCNC: 22 MMOL/L (ref 20–32)
CREAT SERPL-MCNC: 4.32 MG/DL (ref 0.66–1.25)
ERYTHROCYTE [DISTWIDTH] IN BLOOD BY AUTOMATED COUNT: 15.7 % (ref 10–15)
GFR SERPL CREATININE-BSD FRML MDRD: 13 ML/MIN/1.7M2
GLUCOSE SERPL-MCNC: 95 MG/DL (ref 70–99)
HCT VFR BLD AUTO: 20.5 % (ref 40–53)
HGB BLD-MCNC: 6.9 G/DL (ref 13.3–17.7)
HGB BLD-MCNC: 7.7 G/DL (ref 13.3–17.7)
LACTATE BLD-SCNC: 0.5 MMOL/L (ref 0.4–1.9)
MCH RBC QN AUTO: 29.6 PG (ref 26.5–33)
MCHC RBC AUTO-ENTMCNC: 33.7 G/DL (ref 31.5–36.5)
MCV RBC AUTO: 88 FL (ref 78–100)
PLATELET # BLD AUTO: 195 10E9/L (ref 150–450)
POTASSIUM SERPL-SCNC: 4.4 MMOL/L (ref 3.4–5.3)
RBC # BLD AUTO: 2.33 10E12/L (ref 4.4–5.9)
SODIUM SERPL-SCNC: 146 MMOL/L (ref 133–144)
TRANSFUSION STATUS PATIENT QL: NORMAL
TROPONIN I SERPL-MCNC: 0.09 UG/L (ref 0–0.04)
WBC # BLD AUTO: 12.3 10E9/L (ref 4–11)

## 2018-05-28 PROCEDURE — A9270 NON-COVERED ITEM OR SERVICE: HCPCS | Mod: GY | Performed by: HOSPITALIST

## 2018-05-28 PROCEDURE — 99233 SBSQ HOSP IP/OBS HIGH 50: CPT | Performed by: INTERNAL MEDICINE

## 2018-05-28 PROCEDURE — 25000125 ZZHC RX 250: Performed by: INTERNAL MEDICINE

## 2018-05-28 PROCEDURE — 25000128 H RX IP 250 OP 636: Performed by: HOSPITALIST

## 2018-05-28 PROCEDURE — 25000132 ZZH RX MED GY IP 250 OP 250 PS 637: Mod: GY | Performed by: HOSPITALIST

## 2018-05-28 PROCEDURE — 40000886 ZZH STATISTIC STEP DOWN HRS DAY

## 2018-05-28 PROCEDURE — 40000275 ZZH STATISTIC RCP TIME EA 10 MIN

## 2018-05-28 PROCEDURE — 84484 ASSAY OF TROPONIN QUANT: CPT | Performed by: HOSPITALIST

## 2018-05-28 PROCEDURE — 80048 BASIC METABOLIC PNL TOTAL CA: CPT | Performed by: HOSPITALIST

## 2018-05-28 PROCEDURE — P9016 RBC LEUKOCYTES REDUCED: HCPCS | Performed by: EMERGENCY MEDICINE

## 2018-05-28 PROCEDURE — 21000000 ZZH R&B IMCU HEART CARE

## 2018-05-28 PROCEDURE — 85027 COMPLETE CBC AUTOMATED: CPT | Performed by: HOSPITALIST

## 2018-05-28 PROCEDURE — 85018 HEMOGLOBIN: CPT | Performed by: INTERNAL MEDICINE

## 2018-05-28 PROCEDURE — 25000128 H RX IP 250 OP 636: Performed by: INTERNAL MEDICINE

## 2018-05-28 PROCEDURE — 36415 COLL VENOUS BLD VENIPUNCTURE: CPT | Performed by: HOSPITALIST

## 2018-05-28 PROCEDURE — 83605 ASSAY OF LACTIC ACID: CPT | Performed by: INTERNAL MEDICINE

## 2018-05-28 PROCEDURE — 25000128 H RX IP 250 OP 636: Performed by: NURSE PRACTITIONER

## 2018-05-28 PROCEDURE — 25000125 ZZHC RX 250: Performed by: HOSPITALIST

## 2018-05-28 PROCEDURE — 36415 COLL VENOUS BLD VENIPUNCTURE: CPT | Performed by: INTERNAL MEDICINE

## 2018-05-28 RX ORDER — FUROSEMIDE 10 MG/ML
20 INJECTION INTRAMUSCULAR; INTRAVENOUS ONCE
Status: COMPLETED | OUTPATIENT
Start: 2018-05-28 | End: 2018-05-28

## 2018-05-28 RX ORDER — FUROSEMIDE 10 MG/ML
20 INJECTION INTRAMUSCULAR; INTRAVENOUS DAILY
Status: DISCONTINUED | OUTPATIENT
Start: 2018-05-28 | End: 2018-05-30

## 2018-05-28 RX ADMIN — FUROSEMIDE 20 MG: 10 INJECTION, SOLUTION INTRAMUSCULAR; INTRAVENOUS at 05:00

## 2018-05-28 RX ADMIN — Medication 1 TABLET: at 10:15

## 2018-05-28 RX ADMIN — CYANOCOBALAMIN TAB 1000 MCG 2500 MCG: 1000 TAB at 10:15

## 2018-05-28 RX ADMIN — PANTOPRAZOLE SODIUM 40 MG: 40 INJECTION, POWDER, FOR SOLUTION INTRAVENOUS at 09:43

## 2018-05-28 RX ADMIN — HYDRALAZINE HYDROCHLORIDE 50 MG: 50 TABLET ORAL at 10:15

## 2018-05-28 RX ADMIN — PANTOPRAZOLE SODIUM 40 MG: 40 INJECTION, POWDER, FOR SOLUTION INTRAVENOUS at 21:16

## 2018-05-28 RX ADMIN — ACETAMINOPHEN 400 MCG: 400 TABLET ORAL at 10:15

## 2018-05-28 RX ADMIN — ASPIRIN 81 MG 81 MG: 81 TABLET ORAL at 10:15

## 2018-05-28 RX ADMIN — VITAMIN D, TAB 1000IU (100/BT) 1000 UNITS: 25 TAB at 10:15

## 2018-05-28 RX ADMIN — SODIUM CHLORIDE 8 MG/HR: 9 INJECTION, SOLUTION INTRAVENOUS at 22:52

## 2018-05-28 RX ADMIN — AMLODIPINE BESYLATE 10 MG: 10 TABLET ORAL at 10:15

## 2018-05-28 RX ADMIN — PIPERACILLIN SODIUM,TAZOBACTAM SODIUM 2.25 G: 2; .25 INJECTION, POWDER, FOR SOLUTION INTRAVENOUS at 18:16

## 2018-05-28 RX ADMIN — PIPERACILLIN SODIUM,TAZOBACTAM SODIUM 2.25 G: 2; .25 INJECTION, POWDER, FOR SOLUTION INTRAVENOUS at 10:14

## 2018-05-28 RX ADMIN — PIPERACILLIN SODIUM,TAZOBACTAM SODIUM 2.25 G: 2; .25 INJECTION, POWDER, FOR SOLUTION INTRAVENOUS at 04:58

## 2018-05-28 RX ADMIN — LEVOTHYROXINE SODIUM 125 MCG: 100 TABLET ORAL at 10:15

## 2018-05-28 RX ADMIN — FINASTERIDE 5 MG: 5 TABLET, FILM COATED ORAL at 10:15

## 2018-05-28 RX ADMIN — MEMANTINE 10 MG: 10 TABLET ORAL at 10:15

## 2018-05-28 RX ADMIN — FUROSEMIDE 20 MG: 10 INJECTION, SOLUTION INTRAMUSCULAR; INTRAVENOUS at 13:15

## 2018-05-28 NOTE — PROVIDER NOTIFICATION
MD Notification    Notified Person: MD    Notified Person Name:[facility] [place] [facility]    Notification Date/Time:now    Notification Interaction:paged    Purpose of Notification:hgb recheck post blood, trop recheck, additional lasix    Orders Received:  awaiting  Comments:

## 2018-05-28 NOTE — SIGNIFICANT EVENT
2000:  Pt became more agitated and c/o SOB with labored breathing, changed from NC to face mask did not help.  Called Respiratory therapist gave neb treatment and called RRT.  Pt received 40 mg of lasix, 2 mg of morphine.  On BiPap, can switch to 5L mask.

## 2018-05-28 NOTE — PLAN OF CARE
Problem: Patient Care Overview  Goal: Plan of Care/Patient Progress Review  Outcome: Declining  Neuro:oriented to self  CV/Rhythm:SR 1 degree  Resp/02:bipap 40%, tolerated about 2.5 hours off of bipap on oxymizer and oxymask  GI/Diet:NPO on bipap, flat abd, hypoactive bowel sounds  :incontinent  Skin/Incisions/Sites:pink coccynx, abrasions to left hip, turn q2hr  Pulses/CMS:intact, PCDs  Edema:2+ BLE  Activity/Falls Risk:falls risk, bedrest  Lines/Drains/IVs:PIV x 2  Labs/BGM:hgb 6.9  Test/Procedures:blood transfusion 2 U  VS/Pain:stable, no pain  DC Plan:ethics committee tomorrow, LUCINDA charles, at bedside  Other:difficult social issue with pt and SO. SS involved in case, recheck HGB at 1930 p 2nd u PRBC

## 2018-05-28 NOTE — CODE/RAPID RESPONSE
[facility]    House MARIA TERESA RRT Note  5/27/2018   Time Called: 1943    RRT called for: SOB    Assessment & Plan     Acute hypoxic respiratory failure multifactorial 2/2 CHF exacerbation, suspected pulmonary edema, known bilateral pleural effusions, hypervolemia (fluid bolus for possible sepsis, 1 unit PRBC), possible PNA:   - Upon arrival, pt lying in bed in obvious respiratory distress, RR 30s, use of accessory abdominal muscles, on 5L O2 via oxymask with O2 sats >92%, with noted bilateral crackles midway up and expiratory wheezes.  Pt had recently received a duoneb by RT with initial resolution of wheezes per RT; however, were noted on exam.  Noted renal function CKD IV, deferred initiation of lasix and morphine initially.  Placed pt on Bipap 14/7, 40% FiO2 with noted O2 sats >95%.  Pt with noted improvement with respiratory status, and after further discussion with admitting hospitalist, will attempt 1 dose of 40 mg lasix IV after noted lactic acid down to 1.0.  Will also give low dose morphine as pt slightly anxious and to assist with decreased work of breathing.    INTERVENTIONS:  - Place pt on Bipap, will attempt to wean in 1-2 hours and order Bipap 4x daily  - Continuous pulse oximetry  - Will order one time dose lasix 40 mg IV and morphine 2 mg IV  - Considered nitroglycerin; however, recently received PO hydralazine 50 mg.  Will continue to monitor  - No reports of chest pain, nausea, diaphoresis; troponin elevated at 0.071 on admission, suspect due to demand, less likely ACS cause for above diagnosis.  Troponin trend ordered by hospitalist  - Will defer ABG/VBG at this time as pt was hypoxic on admission; not concerned about CO2 at this time.    - Pt had CXR completed on admission today noting bilateral pleural effusions; will defer further imaging to confirm vascular congestion as results would not change plan of care at this time.  - Attempted to discuss code status; unclear with SO.  After  further discussion with admitting hospitalist, hospitalist confirmed code status with pt who was in agreement with DNR/DNI, will continue per pt wishes.    Discussed with and defer further cares to nursing and Dr. Kike Knight.    Interval History     Yuriy Ghosh is a 89 year old male who was admitted on 5/27/2018 for SOB, likely multifactorial.    Medical history significant for: HTN, PAD, pulmonary HTN, aortic stenosis, hypothyroidism, seizures, BPH, CKD IV, HFrEF, recent GIB with acute on chronic anemia, dementia    Code Status: DNR/DNI    Kadeem Bustos, APRN, CNP  House officer    Allergies   No Known Allergies    Physical Exam   Vital Signs with Ranges:  Temp:  [97.7  F (36.5  C)-99.7  F (37.6  C)] 98.1  F (36.7  C)  Pulse:  [86-95] 95  Heart Rate:  [78-98] 89  Resp:  [16-37] 25  BP: (132-159)/(71-85) 158/72  FiO2 (%):  [50 %] 50 %  SpO2:  [85 %-97 %] 97 %     Constitutional: Lying in bed, awake, in respiratory distress  Pulmonary: Moderate respiratory distress with use of abdominal accessory muscles, bilateral crackles midway up, expiratory wheezes noted  Cardiovascular: Mildly tachycardic, regular irregular rate (PACs)  GI: Nondistended  Skin/Integumen: Warm and dry  Neuro: Awake, alert  Psych:  Mildly anxious  Extremities: Moves all extremities, +2-3 BLE edema    Data     Troponin:    Recent Labs   Lab Test  05/27/18   1810   TROPI  0.071*     IMAGING: (X-ray/CT/MRI)   Recent Results (from the past 24 hour(s))   Chest XR,  PA & LAT    Narrative    CHEST TWO VIEWS  5/27/2018 3:43 PM     HISTORY: Check for pneumonia, short of breath, cough and hypoxia.     COMPARISON: Images from prior exam 6/16/1998 are no longer available.  Report from prior study describes healing posterior left-sided rib  fracture.      Impression    IMPRESSION: Left perihilar and right perihilar and peripheral patchy  infiltrates. Bilateral pleural effusions, seen best on the lateral  view. Heart size difficult to evaluate due to  rotation of the patient.  Vascular calcifications are seen.        CBC with Diff:  Recent Labs   Lab Test  05/27/18   1458   WBC  15.3*   HGB  6.3*   MCV  90   PLT  268   INR  1.15*      Lactic Acid:    Lab Results   Component Value Date    LACT 1.0 05/27/2018         Comprehensive Metabolic Panel:  Recent Labs  Lab 05/27/18  1458   *   POTASSIUM 4.6   CHLORIDE 113*   CO2 23   ANIONGAP 9   *   BUN 59*   CR 4.37*   GFRESTIMATED 13*   GFRESTBLACK 15*   TEMO 8.1*     Time Spent on this Encounter   I spent 25 minutes on the unit/floor managing the care of Yuriy Ghosh. 100% of my time was spent counseling the patient and/or coordinating care regarding services listed in this note.

## 2018-05-28 NOTE — PROGRESS NOTES
Pt stable on Bipap all shift, no issues overnight. Bipap alarm level set at 10.     Will continue to follow.     Yuriy Mukherjee RT

## 2018-05-28 NOTE — PROGRESS NOTES
"SPIRITUAL HEALTH SERVICES Progress Note  [facility]    Met with pt and his significant other, Eva.    Eva did most of the talking.  Pt seemed somewhat confused, and wasn't able to answer direct questions, although he did say that he'd like to go home.    Eva and the pt have lived together for 30 years, although they are not legally .  Both were  previously and were friends for over 50 years.  After their spouses  they got together, \"and we have been living in sin ever since!\"    She said that she and he each have a child (he a son, she a daughter) from previous relationships, and that both live in the [facility].    Neither pt nor his significant other have a current [employer] connection, although both have [employer] roots.    Provided conversation and support.  SH team will continue to be available if needs arise.                                                                                                                                               Piper Morales M.A.  Staff   Pager [phone]  Phone [phone]      "

## 2018-05-28 NOTE — PROGRESS NOTES
Pt blood transfusion consent not in chart from 5/27/18 ER. Called to [address] where pt was, no consent form. Dr crooks notified of need for consent. Dr. Crooks and this RN spoke with pt and SHumaOHuma Velasquez for transfusion for Hgb of 6.9. Pt oriented to self and year. S.O. forgetful, walked into a wrong room, repeating pt need to eat despite being told pt on bipap and risk for aspiration. Ethics consult for tomorrow. SS aware.

## 2018-05-28 NOTE — CONSULTS
Consult Date:  05/28/2018      RENAL CONSULTATION      REQUESTING PHYSICIAN:  Tutu Crooks DO      CONSULTANT:  Kelvin Amaya MD      REASON FOR CONSULTATION:  Chronic kidney disease, stage V.      HISTORY OF PRESENT ILLNESS:  This is a 89-year-old who was recently in the hospital with abnormal kidney function among other medical problems.  He has stage IV/V chronic kidney disease as well as BPH and perhaps some element of obstruction.  During his last hospitalization he had a Rodriguez catheter in place, but this was not removed.  He is a nursing home resident.  He has advanced dementia.  He came in apparently with shortness of breath.  His chest x-ray showed some infiltrates and effusions and he was felt to have either pneumonia or congestive heart failure.  He was treated with both IV Lasix, but also IV fluids and today he is getting a transfusion of 2 units of packed cells for hemoglobin of 6.9.      PAST MEDICAL HISTORY:  He has a history of hypertension, peripheral vascular disease, congestive heart failure, duodenal ulcer with GI bleeding which was diagnosed on 05/12/2018 and also dementia.      CURRENT MEDICATIONS:  Include Norvasc and hydralazine.  He is also on calcium plus D and vitamin D.  He is getting an aspirin every day and folate and B12.  He is also on finasteride.  He is on IV Zosyn and IV Protonix.      SOCIAL HISTORY:  He is DNR/DNI.  He is a nursing home patient.  He does not smoke or drink.  He has advanced dementia.      FAMILY HISTORY:  Not relevant.      REVIEW OF SYSTEMS:  The patient is unable to give.  He is relatively obtunded on BiPAP.      PHYSICAL EXAMINATION:     GENERAL:  This is an elderly gentleman who is somnolent and obtunded.  He can respond during the exam and moving his extremities.   VITAL SIGNS:  Blood pressure 155/86, heart rate 108, respiratory rate 24.  He is afebrile.   HEENT:  Head shows no trauma.  The eyes show pupils round and reactive to light.  He is on  BiPAP.   NECK:  Supple.   LUNGS:  Show clear anterior breath sounds.   CARDIAC:  Irregular, normal S1, S2, 2/6 systolic murmur.   ABDOMEN:  Normal bowel sounds, soft and nontender.   GENITOURINARY:  He does not have a Rodriguez catheter in at this point.   EXTREMITIES:  He has 1 to 2+ lower extremity edema bilaterally.      LABORATORY DATA:  His creatinine is 4.32, estimated GFR 13.  This is a little higher than it has been previously, but not much.  His electrolytes are otherwise unremarkable except for a sodium of 146 and bicarbonate of 22, his calcium is 7.5.        Chest x-ray was read as showing bilateral infiltrates and bilateral effusions.      IMPRESSION:   1.  Chronic kidney disease stage V.  He is clearly not a dialysis candidate given his multiple comorbidities and severe dementia.  His examination shows some edema and his chest x-ray did show some elements of perhaps fluid overload.  We will not give him IV fluids at this point and I will continue a low-dose diuretic.  We will monitor his laboratories.  Again, he is not a dialysis candidate.   2.  Anemia, chronic kidney disease can be contributing.  I will review to see if iron and B12 and folate levels have been done.  He has been on oral folate and B12.  He is getting 2 units of packed cells at this point.  We will add Aranesp if indicated.   3.  Hypertension.  He is on amlodipine and hydralazine.  I would simply continue these.      This is a patient who has multiple comorbidities including severe dementia and is clearly at the end of his life.  I would suggest comfort care.         AIDA GONZALEZ MD             D: 2018   T: 2018   MT: DAYA      Name:     SHY BELLA   MRN:      [id number]        Account:       [id number]   :      1926           Consult Date:  2018      Document: [id number]       cc: Kal Guadalupe MD

## 2018-05-28 NOTE — PLAN OF CARE
Problem: Patient Care Overview  Goal: Plan of Care/Patient Progress Review  OT and PT:  orders rec'd and chart reviewed. Spoke with nursing pt on continuous bipap and not appropriate for therapy today.

## 2018-05-28 NOTE — PROGRESS NOTES
Patient was on SOB with increased WOB. RR mid 30s prn neb was given with no change. [place] sounds crackles diminished. RRT was called and was placed pt on BiPAP 14/7 40% tolerating well, WOB improved. Alarm set to 10. Will continue to monitor.

## 2018-05-28 NOTE — PLAN OF CARE
Problem: Cardiac: Heart Failure (Adult)  Goal: Signs and Symptoms of Listed Potential Problems Will be Absent, Minimized or Managed (Cardiac: Heart Failure)  Signs and symptoms of listed potential problems will be absent, minimized or managed by discharge/transition of care (reference Cardiac: Heart Failure (Adult) CPG).   Outcome: Declining  Heart Failure Care Pathway  GOALS TO BE MET BEFORE DISCHARGE:    1. Decrease congestion and/or edema with diuretic therapy to achieve near      optimal volume status.            Dyspnea improved:  No, please explain: bipap            Edema improved:     No, please explain: no change        Net I/O and Weights since admission:          04/28 2300 - 05/28 2259  In: 1760 [P.O.:360; I.V.:500]  Out: -   Net: 1760            Vitals:    05/28/18 0500   Weight: 69.5 kg (153 lb 3.5 oz)       2.  O2 sats > 92% on RA or at prior home O2 therapy level.          Current oxygenation status:       SpO2: 97 %         O2 Device: BiPAP/CPAP,  Oxygen Delivery: 10 LPM         Able to wean O2 this shift to keep sats > 92%:  No, please explain: bipap 40% - 2.5 hours off bipap        Does patient use Home O2? No    3.  Tolerates ambulation and mobility near baseline: No, please explain: bedrest, HGB 6.9        How many times did the patient ambulate with nursing staff this shift? 0      Please review the Heart Failure Care Pathway for additional HF goal outcomes.    Mónica Gardiner RN  5/28/2018

## 2018-05-28 NOTE — PROVIDER NOTIFICATION
MD Notification     Notified Person: MD cagle    Notified Person Name: -    Notification Date/Time: now    Notification Interaction: hgb  Purpose of Notification:lab value    Orders Received:  Gi consult    Comments:

## 2018-05-28 NOTE — PROGRESS NOTES
Attempted to transition pt from Bipap to oxymask. O2 sats 98% on 5 L, RR 16-20, but increased WOB, use of accessory muscles. Bilateral crackles. Notified house officer. CNP assessed pt, placed back on Bipap.

## 2018-05-28 NOTE — PROGRESS NOTES
House MARIA TERESA follow up:    Nursing attempted to transition pt from Bipap to oxymask; however, noted increased use of accessory abdominal muscles and diffuse bilateral crackles 2/3s up while on oxymask.  Pt's O2 sats >92% with RR 10s.  Pt to receive an additional 1 unit PRBC for hemoglobin 6.5.  In setting of multifactorial respiratory failure, will transfer pt to [facility] as pt requiring continuous Bipap at this time.  Will order an additional dose of lasix IV once PRBC completed.  Transfer orders placed, including NPO while requiring continuous Bipap.    Please call/page with any further concerns overnight.    CHADD Alvarez, CNP  House officer  Pager: [phone]

## 2018-05-28 NOTE — PLAN OF CARE
Problem: Patient Care Overview  Goal: Plan of Care/Patient Progress Review  Outcome: No Change  Pt stable after transfer from 6th floor.   VSS, Pt on BIPAP with good 02 sat's, LS have fine crackles throughout.  Pt received one unit of PRBC and Lasix with good U/O.  Pt is alert to self only.  Pt's wife is here this am and updated on plan of care.  No new issues, will continue to monitor.

## 2018-05-28 NOTE — PROGRESS NOTES
[facility]    Hospitalist Progress Note    Assessment & Plan   Yuriy Ghosh is a 89 year old male with a past medical history of HTN, PAD, hypothyroid, seizure disorder, BPH, CKD, CHF, recent hospitalization complicated by GI bleed due to duodenal ulcer who presented with shortness of breath.    Advance care planning  At this time the patient has multiple comorbidities and declining status.  I am concerned that the patient may not recover from this during this hospitalization.  Nephrology does not feel as though the patient is a dialysis catheter and I question if aggressive cares would even be beneficial for this patient.  I feel end of life discussions should be addressed with the patient but unfortunately he is too demented to make decisions regarding this.  He does have a significant other who has POA paperwork but these records to not appear to address medical issues/medical POA decision making and look to be just financial POA.  It appears that the patient has a son who is estranged who may actually need to be contacted regarding end of life care.  End of life discussions have been brought up with the significant other during previous admission and she refused.  I spoke to Roshni with social work who stated that the significant other also has significant cognitive deficits noted on previous admissions and I wonder if she has decisional making capacity.    - Ethics consult placed given the difficult situation regarding who the medical POA should be, whether or not the significant other who was designated financial POA is decisional and if not what steps need to be taken      Acute hypoxic respiratory failure  Suspected HFrEF exacerbation   Possible HCAP  Pulmonary HTN   Hypoxia to 84% on room air upon arrival.  Suspect multifactorial including possible pneumonia (new leukocytosis with left shift, though afebrile, lactate of 2.9) and acute on chronic CHF (pro-BNP >48K with marked lower  extremity edema).  CXR showing bilateral patchy opacities though procalcitonin was not elevated   Last echo was 5/4/18 with an EF of 45-50% and global hypokinesia.  1-2+ MR, 1-2+ TR, moderate AS and pulmonary HTN also noted.  Not currently on beta blocker due to intolerance and not on ACEi/ARB due to CKD   - BiPAP PRN and wean O2 as needed  - Continue zosyn for now.  Will likely discontinue tomorrow as my suspicion for pneumonia is low and feel fluid overload is more likely the etiology of his hypoxia.    - Nephrology gave low dose 20 mg IV Lasix x1  - Strict I/Os and daily weights     CKD stage IV  Evaluated by Nephrology during last admission, suspect progression of CKD due to HTN and vascular disease.  Admission Cr 4.3, which is largely stable from recent discharge.  - Continue to monitor  - Nephrology following and appreciate their assistance.  Do not feel patient is a dialysis candidate     Acute on chronic anemia  Experienced GIB due to duodenal ulcer on 5/12/18.  Discharge hgb 7.8, admission hgb 6.3.  He has received 2 units of PRBCs and only radha to 6.9.  No known melena or hematochezia but slow GI bleed can not be excluded.  Could also be due to poor RBC production   - Transfuse 2 more unites of PRBCs with recheck hgb 1 hour after  - GI consult placed and at this time holding off on repeat endoscopy  - Iron studies , Folate/B12 checked     HTN  - PTA Amlodipine and Hydralazine     Hypothyroid  - Continue PTA levothyroxine     Seizure disorder  - Continue PTA Keppra     Dementia  - Continue PTA donepezil and memantine  - PRN seroquel for agitation     BPH  - Continue PTA finasteride    # Pain Assessment:  Current Pain Score 5/28/2018   Patient currently in pain? denies   Pain score (0-10) -   Pain location -   Yuriy s pain level was assessed and he currently denies pain.        DVT Prophylaxis: Pneumatic Compression Devices  Code Status: DNR/DNI    Disposition: Expected discharge TBD.  Patient is still  requiring BiPAP PRN.  May benefit from hospice evaluation    Tutu Crooks, DO  Text Page (7am to 6pm)    Interval History   Patient seen and examined.  Able to titrate off of BiPAP briefly this morning but had to be placed back on it.  No pain currently.  No fevers or chills    -Data reviewed today: I reviewed all new labs and imaging results over the last 24 hours. I personally reviewed no images or EKG's today.    Physical Exam   Temp: 98.8  F (37.1  C) Temp src: Axillary BP: 125/69 Pulse: 95 Heart Rate: 81 Resp: 16 SpO2: 98 % O2 Device: BiPAP/CPAP Oxygen Delivery: 10 LPM  Vitals:    05/28/18 0500   Weight: 69.5 kg (153 lb 3.5 oz)     Vital Signs with Ranges  Temp:  [97.7  F (36.5  C)-100.8  F (38.2  C)] 98.8  F (37.1  C)  Pulse:  [86-95] 95  Heart Rate:  [] 81  Resp:  [14-37] 16  BP: (125-173)/() 125/69  FiO2 (%):  [40 %-50 %] 40 %  SpO2:  [69 %-100 %] 98 %  I/O last 3 completed shifts:  In: 400 [I.V.:400]  Out: -     Constitutional: Pleasantly demented.  Awake and alert  Respiratory: Clear to auscultation bilaterally, no crackles or wheezing  Cardiovascular: Regular rate and rhythm, normal S1 and S2, and no murmur noted  GI: Normal bowel sounds, soft, non-distended, non-tender  Skin/Integumen: No rashes, no cyanosis  MSK: Bilateral lower extremity edema     Medications     - MEDICATION INSTRUCTIONS -       - MEDICATION INSTRUCTIONS -       - MEDICATION INSTRUCTIONS -         amLODIPine  10 mg Oral Daily     aspirin  81 mg Oral Daily     calcium citrate-vitamin D  1 tablet Oral BID     cholecalciferol  1,000 Units Oral Daily     donepezil (ARICEPT) tablet 10 mg  10 mg Oral At Bedtime     finasteride  5 mg Oral Daily     furosemide  20 mg Intravenous Daily     hydrALAZINE  50 mg Oral TID     levETIRAcetam (KEPPRA) tablet 250 mg  250 mg Oral At Bedtime     levothyroxine (SYNTHROID/LEVOTHROID) tablet 125 mcg  125 mcg Oral QAM AC     memantine (NAMENDA) tablet 10 mg  10 mg Oral BID     pantoprazole  (PROTONIX) IV  40 mg Intravenous BID     piperacillin-tazobactam  2.25 g Intravenous Q6H     sodium chloride (PF)  3 mL Intracatheter Q8H       Data     Recent Labs  Lab 05/28/18  0738 05/28/18  0210 05/27/18  2150 05/27/18  1810 05/27/18  1458   WBC 12.3*  --   --   --  15.3*   HGB 6.9*  --  6.5*  --  6.3*   MCV 88  --   --   --  90     --   --   --  268   INR  --   --   --   --  1.15*   *  --   --   --  145*   POTASSIUM 4.4  --   --   --  4.6   CHLORIDE 114*  --   --   --  113*   CO2 22  --   --   --  23   BUN 59*  --   --   --  59*   CR 4.32*  --   --   --  4.37*   ANIONGAP 10  --   --   --  9   TEMO 7.5*  --   --   --  8.1*   GLC 95  --   --   --  145*   TROPI  --  0.092* 0.062* 0.071* 0.066*       Imaging:   Recent Results (from the past 24 hour(s))   Chest XR,  PA & LAT    Narrative    CHEST TWO VIEWS  5/27/2018 3:43 PM     HISTORY: Check for pneumonia, short of breath, cough and hypoxia.     COMPARISON: Images from prior exam 6/16/1998 are no longer available.  Report from prior study describes healing posterior left-sided rib  fracture.      Impression    IMPRESSION: Left perihilar and right perihilar and peripheral patchy  infiltrates. Bilateral pleural effusions, seen best on the lateral  view. Heart size difficult to evaluate due to rotation of the patient.  Vascular calcifications are seen.       LUZ MURILLO MD

## 2018-05-28 NOTE — PROGRESS NOTES
Pt off bipap on oxymizer 7 L at 0845. Tolerated oxymizer and oxymask with turn/reposition 10-15 L. Pt desated on oxymask and bipap reapplied at 40%. Continue to assess resp status.

## 2018-05-28 NOTE — PROGRESS NOTES
Pt transferred to CCU, room 275. 1 unit of PRBC infusing. Pt on continuous BiPap. Report called to ALLI Storm.

## 2018-05-28 NOTE — PROVIDER NOTIFICATION
MD Notification    Notified Person: MD    Notified Person Name:On call Hospitalist    Notification Date/Time:5/27/18 2200    Notification Interaction: paged    Purpose of Notification: critical lab value Hgb 6.5    Orders Received:  1 unit of blood    Comments:

## 2018-05-29 LAB
ABO + RH BLD: NORMAL
ABO + RH BLD: NORMAL
ANION GAP SERPL CALCULATED.3IONS-SCNC: 8 MMOL/L (ref 3–14)
BLD GP AB SCN SERPL QL: NORMAL
BLD PROD TYP BPU: NORMAL
BLD UNIT ID BPU: 0
BLD UNIT ID BPU: 0
BLOOD BANK CMNT PATIENT-IMP: NORMAL
BLOOD PRODUCT CODE: NORMAL
BLOOD PRODUCT CODE: NORMAL
BPU ID: NORMAL
BPU ID: NORMAL
BUN SERPL-MCNC: 71 MG/DL (ref 7–30)
CALCIUM SERPL-MCNC: 7.3 MG/DL (ref 8.5–10.1)
CHLORIDE SERPL-SCNC: 118 MMOL/L (ref 94–109)
CO2 SERPL-SCNC: 23 MMOL/L (ref 20–32)
CREAT SERPL-MCNC: 4.6 MG/DL (ref 0.66–1.25)
GFR SERPL CREATININE-BSD FRML MDRD: 12 ML/MIN/1.7M2
GLUCOSE SERPL-MCNC: 90 MG/DL (ref 70–99)
HGB BLD-MCNC: 7 G/DL (ref 13.3–17.7)
HGB BLD-MCNC: 7.8 G/DL (ref 13.3–17.7)
HGB BLD-MCNC: 9.4 G/DL (ref 13.3–17.7)
MAGNESIUM SERPL-MCNC: 2.1 MG/DL (ref 1.6–2.3)
NUM BPU REQUESTED: 6
PHOSPHATE SERPL-MCNC: 5.3 MG/DL (ref 2.5–4.5)
POTASSIUM SERPL-SCNC: 4.3 MMOL/L (ref 3.4–5.3)
SODIUM SERPL-SCNC: 149 MMOL/L (ref 133–144)
SPECIMEN EXP DATE BLD: NORMAL
TRANSFUSION STATUS PATIENT QL: NORMAL

## 2018-05-29 PROCEDURE — 85018 HEMOGLOBIN: CPT | Performed by: INTERNAL MEDICINE

## 2018-05-29 PROCEDURE — 99233 SBSQ HOSP IP/OBS HIGH 50: CPT | Performed by: INTERNAL MEDICINE

## 2018-05-29 PROCEDURE — 25000132 ZZH RX MED GY IP 250 OP 250 PS 637: Mod: GY | Performed by: HOSPITALIST

## 2018-05-29 PROCEDURE — 40000275 ZZH STATISTIC RCP TIME EA 10 MIN

## 2018-05-29 PROCEDURE — 25000128 H RX IP 250 OP 636: Performed by: HOSPITALIST

## 2018-05-29 PROCEDURE — 84100 ASSAY OF PHOSPHORUS: CPT | Performed by: INTERNAL MEDICINE

## 2018-05-29 PROCEDURE — 25000125 ZZHC RX 250: Performed by: HOSPITALIST

## 2018-05-29 PROCEDURE — 83735 ASSAY OF MAGNESIUM: CPT | Performed by: INTERNAL MEDICINE

## 2018-05-29 PROCEDURE — 36415 COLL VENOUS BLD VENIPUNCTURE: CPT | Performed by: HOSPITALIST

## 2018-05-29 PROCEDURE — 12000000 ZZH R&B MED SURG/OB

## 2018-05-29 PROCEDURE — P9016 RBC LEUKOCYTES REDUCED: HCPCS | Performed by: EMERGENCY MEDICINE

## 2018-05-29 PROCEDURE — 99223 1ST HOSP IP/OBS HIGH 75: CPT | Performed by: NURSE PRACTITIONER

## 2018-05-29 PROCEDURE — 25000128 H RX IP 250 OP 636: Performed by: INTERNAL MEDICINE

## 2018-05-29 PROCEDURE — 80048 BASIC METABOLIC PNL TOTAL CA: CPT | Performed by: INTERNAL MEDICINE

## 2018-05-29 PROCEDURE — 27210995 ZZH RX 272: Performed by: INTERNAL MEDICINE

## 2018-05-29 PROCEDURE — 25000132 ZZH RX MED GY IP 250 OP 250 PS 637: Mod: GY | Performed by: NURSE PRACTITIONER

## 2018-05-29 PROCEDURE — 85018 HEMOGLOBIN: CPT | Performed by: HOSPITALIST

## 2018-05-29 PROCEDURE — A9270 NON-COVERED ITEM OR SERVICE: HCPCS | Mod: GY | Performed by: HOSPITALIST

## 2018-05-29 PROCEDURE — 94660 CPAP INITIATION&MGMT: CPT

## 2018-05-29 PROCEDURE — 36415 COLL VENOUS BLD VENIPUNCTURE: CPT | Performed by: INTERNAL MEDICINE

## 2018-05-29 RX ORDER — FLUORIDE TOOTHPASTE
15 TOOTHPASTE DENTAL 4 TIMES DAILY PRN
Status: DISCONTINUED | OUTPATIENT
Start: 2018-05-29 | End: 2018-06-01 | Stop reason: HOSPADM

## 2018-05-29 RX ORDER — ATROPINE SULFATE 10 MG/ML
1-2 SOLUTION/ DROPS OPHTHALMIC
Status: DISCONTINUED | OUTPATIENT
Start: 2018-05-29 | End: 2018-06-01 | Stop reason: HOSPADM

## 2018-05-29 RX ORDER — GLYCOPYRROLATE 0.2 MG/ML
.2-.4 INJECTION, SOLUTION INTRAMUSCULAR; INTRAVENOUS EVERY 4 HOURS PRN
Status: DISCONTINUED | OUTPATIENT
Start: 2018-05-29 | End: 2018-05-30

## 2018-05-29 RX ORDER — HYDROMORPHONE HYDROCHLORIDE 10 MG/ML
2-4 INJECTION INTRAMUSCULAR; INTRAVENOUS; SUBCUTANEOUS
Status: DISCONTINUED | OUTPATIENT
Start: 2018-05-29 | End: 2018-06-01 | Stop reason: HOSPADM

## 2018-05-29 RX ORDER — SODIUM CHLORIDE 450 MG/100ML
INJECTION, SOLUTION INTRAVENOUS CONTINUOUS
Status: DISCONTINUED | OUTPATIENT
Start: 2018-05-29 | End: 2018-05-29

## 2018-05-29 RX ORDER — HALOPERIDOL 2 MG/ML
1-2 SOLUTION ORAL EVERY 6 HOURS PRN
Status: DISCONTINUED | OUTPATIENT
Start: 2018-05-29 | End: 2018-06-01 | Stop reason: HOSPADM

## 2018-05-29 RX ADMIN — DONEPEZIL HYDROCHLORIDE 10 MG: 10 TABLET ORAL at 21:51

## 2018-05-29 RX ADMIN — PIPERACILLIN SODIUM,TAZOBACTAM SODIUM 2.25 G: 2; .25 INJECTION, POWDER, FOR SOLUTION INTRAVENOUS at 11:47

## 2018-05-29 RX ADMIN — HYDROMORPHONE HYDROCHLORIDE 2 MG: 10 INJECTION, SOLUTION INTRAMUSCULAR; INTRAVENOUS; SUBCUTANEOUS at 16:28

## 2018-05-29 RX ADMIN — SODIUM CHLORIDE: 4.5 INJECTION, SOLUTION INTRAVENOUS at 09:25

## 2018-05-29 RX ADMIN — AMLODIPINE BESYLATE 10 MG: 10 TABLET ORAL at 08:57

## 2018-05-29 RX ADMIN — Medication 1 TABLET: at 09:17

## 2018-05-29 RX ADMIN — FINASTERIDE 5 MG: 5 TABLET, FILM COATED ORAL at 09:00

## 2018-05-29 RX ADMIN — MEMANTINE 10 MG: 10 TABLET ORAL at 20:45

## 2018-05-29 RX ADMIN — MEMANTINE 10 MG: 10 TABLET ORAL at 08:59

## 2018-05-29 RX ADMIN — LEVETIRACETAM 250 MG: 250 TABLET, FILM COATED ORAL at 21:51

## 2018-05-29 RX ADMIN — FUROSEMIDE 20 MG: 10 INJECTION, SOLUTION INTRAMUSCULAR; INTRAVENOUS at 11:14

## 2018-05-29 RX ADMIN — SODIUM CHLORIDE 8 MG/HR: 9 INJECTION, SOLUTION INTRAVENOUS at 10:25

## 2018-05-29 RX ADMIN — LEVOTHYROXINE SODIUM 125 MCG: 100 TABLET ORAL at 09:01

## 2018-05-29 RX ADMIN — PIPERACILLIN SODIUM,TAZOBACTAM SODIUM 2.25 G: 2; .25 INJECTION, POWDER, FOR SOLUTION INTRAVENOUS at 00:40

## 2018-05-29 RX ADMIN — VITAMIN D, TAB 1000IU (100/BT) 1000 UNITS: 25 TAB at 09:16

## 2018-05-29 RX ADMIN — PIPERACILLIN SODIUM,TAZOBACTAM SODIUM 2.25 G: 2; .25 INJECTION, POWDER, FOR SOLUTION INTRAVENOUS at 05:20

## 2018-05-29 RX ADMIN — HYDRALAZINE HYDROCHLORIDE 50 MG: 50 TABLET ORAL at 08:57

## 2018-05-29 NOTE — PROGRESS NOTES
Orders in place for comfort care.  Our group will sign off.  Thanks.    Jaime Kaye MD  Nephrology; [facility]  [phone]

## 2018-05-29 NOTE — PLAN OF CARE
Problem: Patient Care Overview  Goal: Plan of Care/Patient Progress Review  Outcome: No Change  Pt VSS on bipap overnight. Pt short term memory, frequently asks for water. Tele SR w/ fist degree AVB. Pt oriented to self. Pt turned in bed p2h, incontinent of urine and stool. Stool overnight no longer treasure, darker red, still fairly loose. Denies pain. Slept. Plan to continue to monitor hgb, continue protonix gtt and for ethics meeting to develop a plan of care. Continue to monitor.

## 2018-05-29 NOTE — CONSULTS
"[facility]    Palliative Care Consultation     Yuriy Ghosh  MRN# [id number]  Date of Admission:  5/27/2018  Date of Service (when I saw the patient): 05/29/18  Reason for consult: Consulted by Dr. Connell for Goals of care, Patient and family support    Assessment & Plan   Yuriy Ghosh is a 89 year old male with PMH significant for advanced dementia, HTN, PAD, CHF with EF 45-50%, hypothyroid, seizure disorder, CKD stage IV/V, and recent hospitalization 5/1-5/15 for GIB 2/2 duodenal ulcer who presents with SOB. His hospitalization has been complicated by acute respiratory failure and acute blood loss anemia 2/2 ongoing GIB. He is hemodynamically unstable to pursue required endoscopy. He has ongoing renal failure. He is requiring diuresis with blood product support. His prognosis is very poor. We are consulted for goals of care and pt and family support.     Symptoms/Recommendations   -Transition to comfort measures only, stop checking VS, labs, tele, blood transfusion support   -O2 support for comfort; do not escalate to bipap. Treat dyspnea/SOB with dilaudid 2-4mg SL every 2hrs PRN   -Regular diet for pleasure and comfort   -Haldol 1-2mg SL every 6hrs PRN agitation, delirium, hallucinations, restlessness, nausea   -Ativan 1mg SL every 3hrs PRN agitation, nausea   -SW consult for hospice placement; prepared pt's significant other that given how sick he is, it is very possible that he could decompensate while here in the hospital and may in fact die inpatient     Support/Coping  -Pt has a significant other, Velasquez, of 30+ years (friends for 50+ years). They live together, but never . Pt has 1 adult son, Pat, from a previous relationship. Per sig other, Pat is estranged from Sam and Eva. Pat has significant substance abuse issues, is \"very dangerous\" and has collusion with the police department. She was adamant that Pat not be contacted, as this would be very distressing to Sam to see him " at end of life. I told her that we have tried to find him, but have not been able to make contact. I did encourage Eva to reach out to additional family/friends for support, and to notify them of Sam's declining health. Pt's sister  of kidney failure   -Pt is Pentecostalism with some Adventism roots. They do not have a Bahai connection, but value their genia. Appreciate support from Palliative , Alondra Morales    Decisional Support, Goals of Care, Counseling & Coordination  Decisional Capacity Intact?  -No  Health Care Directive on File?  -No. There is a directive on file, but this is for financial purposes. It does not address health care surrogate decision making. In the absence of a directive, decision making would default to adult children, given that pt is not  to Eva. However, there appears to be no viable connection between Sam and his son Debi; they are estranged per report of sig other Eva. She has no way to contact Debi. Unit KIMBERLY Barakat has made ample attempts at finding him through the Internet without success. Given that there is no true next of kin, we will ultimately defer to his life long partner, Eva, to assist as surrogate decision maker given the critical nature of his health, and given her devotion, loyalty, and willingness to serve in this role   Code Status/Resuscitation Preferences?  -DNR/DNI     Discussion  Visited with sig fortino Velasquez in private this afternoon. Introduced the scope of our practice to Eva. Discussed our potential roles for symptom management, support/coping, and decisional support (aka goals of care).     She was initially suspicious that I was only there to talk to her about the advanced directive issue. I clarified that I was in fact there to visit with her regarding Sam's health and how to best care for him.    Eva understands that Sam has failing kidneys. Although she does not go into great technicality regarding his health, she tells me that she  understands that he is very sick and probably on his way out.     I confirmed with her that I agree that Sam is very sick. I express concern that I believe he is dying. We talk about his kidneys failing, along with his heart, lungs, GI system, and brain.     Eva is tearful in acknowledging that Sam will die. We talk about how to best care for him through a comfort care approach. I recommend stepping away from aggressive treatments that are prolonging his life, while also prolonging his death. We talk about allowing him to eat and drink for pleasure, and prioritizing pain and symptom management for whatever amount of time he has left to live. Eva is in complete agreement. She wonders how much time he has to live. I share with her that I think we are likely measuring time in days to maybe 1-2 weeks at this time. I prepare her that he could decline quickly and even die in the hospital. Should he plateau with this new treatment plan, sometimes we start the conversation of going to a nursing facility with hospice. She is in complete agreement.     Eva is appropriately tearful, but tells me she is strong and will be ok. She has her own support system, and relies on her genia.     We then visited Sam. He is alert and conversant. He is in no distress. He wants to drink water. He denies pain, SOB, nausea. Beyond this, he is unable to hold any meaningful conversation.     Case reviewed at length with nursing staff, unit SW Roshni Rodriguez, and chair of ethics committee Harvey Strickland.     Thank you for involving us in the care of this patient and family. We will continue to follow. Please do not hesitate to contact me with questions or concerns or the on-call provider for our team if evening or weekend.    Cailin FLORENTINO, [facility]  Palliative Medicine   Pager [phone]    Attestation:  Total time on the floor involved in the patient's care: 70 minutes  Total time spent in counseling/care coordination: >50%    Chief  "Complaint   SOB    History is obtained from the patient, staff, family and extensive chart review.     Past Medical History    I have reviewed this patient's medical history and updated it with pertinent information if needed.   Past Medical History:   Diagnosis Date     Alzheimer disease      BPH (benign prostatic hyperplasia)      Bradycardia      Duodenal ulceration      High cholesterol      PAD (peripheral artery disease) (H)      Seizure (H)      Thyroid disease        Past Surgical History   I have reviewed this patient's surgical history and updated it with pertinent information if needed.  Past Surgical History:   Procedure Laterality Date     CARDIAC SURGERY      stent       Social History   Living situation: Had been living with sig other Eva of 30+ years in her home on [address] in [place], [place]. Recently has been at [facility]    Family system: Pt has a significant other, Eva, of 30+ years (friends for 50+ years). They live together, but never . Pt has 1 adult son, Pat, from a previous relationship. Per sig other, Pat is estranged from Sam and Eva. Debi has significant substance abuse issues, is \"very dangerous\" and has collusion with the police department. She was adamant that Debi not be contacted, as this would be very distressing to Sam to see him at end of life. I told her that we have tried to find him, but have not been able to make contact. I did encourage Eva to reach out to additional family/friends for support, and to notify them of Sam's declining health. Pt's sister  of kidney failure     Self-identified support system: Eva     Employment/education: [place]    Activities/interests: They previously enjoyed vacationing in [place]     Use of community resources: [place]    Presybeterian affiliation: Adventism     Involvement in genia community: None     Impact of illness on patient: Pt has multi-organ system failure. He is dying. Care now to be focused on his comfort moving forward. "     Family History   I have reviewed this patient's family history and updated it with pertinent information if needed.   No family history on file.    Allergies   No Known Allergies    Medications   Current Facility-Administered Medications Ordered in Epic   Medication Dose Route Frequency Last Rate Last Dose     acetaminophen (TYLENOL) Suppository 650 mg  650 mg Rectal Q4H PRN         acetaminophen (TYLENOL) tablet 650 mg  650 mg Oral Q4H PRN         albuterol neb solution 2.5 mg  3 mL Nebulization Q2H PRN   2.5 mg at 05/27/18 1943     artificial saliva (BIOTENE DRY MOUTHWASH) liquid 15 mL  15 mL Swish & Spit 4x Daily PRN         atropine 1 % ophthalmic solution 1-2 drop  1-2 drop Sublingual Q1H PRN         bisacodyl (DULCOLAX) Suppository 10 mg  10 mg Rectal Daily PRN         donepezil (ARICEPT) tablet 10 mg  10 mg Oral At Bedtime         finasteride (PROSCAR) tablet 5 mg  5 mg Oral Daily   5 mg at 05/29/18 0900     furosemide (LASIX) injection 20 mg  20 mg Intravenous Daily   20 mg at 05/29/18 1114     glycopyrrolate (ROBINUL) injection 0.2-0.4 mg  0.2-0.4 mg Intravenous Q4H PRN         haloperidol (HALDOL) 2 MG/ML (HIGH CONC) solution 1-2 mg  1-2 mg Oral Q6H PRN         HYDROmorphone (DILAUDID) (HIGH CONC) oral solution 2-4 mg  2-4 mg Sublingual Q2H PRN         hypromellose-dextran (ARTIFICAL TEARS) 0.1-0.3 % ophthalmic solution 1 drop  1 drop Both Eyes Q1H PRN         levETIRAcetam (KEPPRA) tablet 250 mg  250 mg Oral At Bedtime         levothyroxine (SYNTHROID/LEVOTHROID) tablet 125 mcg  125 mcg Oral QAM AC   125 mcg at 05/29/18 0901     lidocaine (LMX4) cream   Topical Q1H PRN         lidocaine 1 % 1 mL  1 mL Other Q1H PRN         LORazepam (ATIVAN) 1 mg/0.5 mL (HIGH CONC) solution 1 mg  1 mg Oral Q3H PRN         memantine (NAMENDA) tablet 10 mg  10 mg Oral BID   10 mg at 05/29/18 0859     ondansetron (ZOFRAN-ODT) ODT tab 4 mg  4 mg Oral Q6H PRN        Or     ondansetron (ZOFRAN) injection 4 mg  4 mg  Intravenous Q6H PRN         Patient may continue current oral medications   Does not apply Continuous PRN         Patient may continue current oral medications   Does not apply Continuous PRN         polyethylene glycol (MIRALAX/GLYCOLAX) Packet 17 g  17 g Oral Daily PRN         prochlorperazine (COMPAZINE) injection 5 mg  5 mg Intravenous Q6H PRN        Or     prochlorperazine (COMPAZINE) tablet 5 mg  5 mg Oral Q6H PRN        Or     prochlorperazine (COMPAZINE) Suppository 12.5 mg  12.5 mg Rectal Q12H PRN         senna-docusate (SENOKOT-S;PERICOLACE) 8.6-50 MG per tablet 1 tablet  1 tablet Oral BID PRN        Or     senna-docusate (SENOKOT-S;PERICOLACE) 8.6-50 MG per tablet 2 tablet  2 tablet Oral BID PRN         sodium chloride (PF) 0.9% PF flush 3 mL  3 mL Intracatheter Q1H PRN   3 mL at 05/28/18 1016     sodium chloride (PF) 0.9% PF flush 3 mL  3 mL Intracatheter Q8H   3 mL at 05/29/18 0924     No current Epic-ordered outpatient prescriptions on file.       Review of Systems   The comprehensive review of systems is negative other than noted here and in the assessment/plan.    Palliative Symptom Review (0=no symptom/no concern, 1=mild, 2=moderate, 3=severe):  Pain: 0  Nausea: 0  Diarrhea: 2  Shortness of Breath: 0    Physical Exam   Temp: 98  F (36.7  C) Temp src: Oral BP: (!) 161/92 Pulse: 82 Heart Rate: 85 Resp: 22 SpO2: 99 % O2 Device: Oxymask Oxygen Delivery: 8 LPM  Vitals:    05/28/18 0500 05/29/18 0608   Weight: 69.5 kg (153 lb 3.5 oz) 65.6 kg (144 lb 10 oz)     CONSTITUTIONAL: Chronically ill elderly man seen lying in bed in NAD, alert, disoriented. He appears calm and cooperative. Sig other present   HEENT: NCAT  RESPIRATORY: NL respiratory effort on facemask   NEUROLOGIC: Seemingly appropriately responsive during interview at times, but otherwise disoriented with fixated thoughts (on water)    Data   Results for orders placed or performed during the hospital encounter of 05/27/18 (from the past 24  hour(s))   Hemoglobin   Result Value Ref Range    Hemoglobin 7.7 (L) 13.3 - 17.7 g/dL   Lactic acid level STAT for sepsis protocol   Result Value Ref Range    Lactate for Sepsis Protocol 0.5 0.4 - 1.9 mmol/L   Hemoglobin (Every 4 Hrs)   Result Value Ref Range    Hemoglobin 7.8 (L) 13.3 - 17.7 g/dL   Basic metabolic panel   Result Value Ref Range    Sodium 149 (H) 133 - 144 mmol/L    Potassium 4.3 3.4 - 5.3 mmol/L    Chloride 118 (H) 94 - 109 mmol/L    Carbon Dioxide 23 20 - 32 mmol/L    Anion Gap 8 3 - 14 mmol/L    Glucose 90 70 - 99 mg/dL    Urea Nitrogen 71 (H) 7 - 30 mg/dL    Creatinine 4.60 (H) 0.66 - 1.25 mg/dL    GFR Estimate 12 (L) >60 mL/min/1.7m2    GFR Estimate If Black 15 (L) >60 mL/min/1.7m2    Calcium 7.3 (L) 8.5 - 10.1 mg/dL   Phosphorus   Result Value Ref Range    Phosphorus 5.3 (H) 2.5 - 4.5 mg/dL   Magnesium   Result Value Ref Range    Magnesium 2.1 1.6 - 2.3 mg/dL   Hemoglobin (Every 4 Hrs)   Result Value Ref Range    Hemoglobin 7.0 (L) 13.3 - 17.7 g/dL   Hemoglobin (Every 4 Hrs)   Result Value Ref Range    Hemoglobin 9.4 (L) 13.3 - 17.7 g/dL

## 2018-05-29 NOTE — CONSULTS
[facility]  Gastroenterology Consultation         Yuriy Ghosh  [address] [place] [place] [address]  89 year old male    Admission Date/Time: 5/27/2018  Primary Care Provider: Kal Guadalupe  Referring / Attending Physician:  Dr. Crooks     We were asked to see the patient in consultation by Dr. Crooks for evaluation of Anemia.      CC: Anemia    HPI:  Yuriy Ghosh is a 89 year old male with history of advanced chronic kidney disease hypertension peripheral arterial disease hypothyroidism seizure disorder BPH CHF with recent hospitalization due to significant renal problem.  Prior to discharge patient developed acute upper GI bleed for which patient was evaluated with upper GI endoscopy and actively bleeding ulcer was cauterized and the second portion of duodenum and in the duodenal bulb area patient did well patient was discharged now patient was admitted through ER with significant worsening of shortness of breath with hypoxia his pulse ox was 84% on room air chest x-ray showed significant infiltrate and fluid overload suspect CHF versus pneumonia patient had significant lower  extremity edema.  His proBNP was more than 48,000 patient's hemoglobin was 6.3 patient was started on IV antibiotics.Patient was given 2 units of blood patient's hemoglobin is still 6.9 this morning patient is getting more blood patient has been in significant respiratory distress patient was on BiPAP which was just taken off patient is on facemask oxygen patient is still tachypneic and having difficulty completing sentences and appears short of breath.  Patient's JEREMIAH on May 4 showed ejection fraction between 45-50% with 1-2+ MR and TR moderate aortic stenosis pulmonary hypertension.  His creatinine at the time of admission was 4.3.  Since his admission patient has not had any signs of active GI bleeding no bowel movement no nausea abdominal pain hematemesis melena patient is currently on IV Protonix.  Patient  is denying any active GI symptoms.  Patient's current symptoms are primarily significant respiratory distress.  Patient's symptoms and clinical condition was discussed in detail with patient's significant other.    ROS: A comprehensive ten point review of systems was negative aside from those in mentioned in the HPI.      PAST MED HX:  I have reviewed this patient's medical history and updated it with pertinent information if needed.   Past Medical History:   Diagnosis Date     Alzheimer disease      BPH (benign prostatic hyperplasia)      Bradycardia      Duodenal ulceration      High cholesterol      PAD (peripheral artery disease) (H)      Seizure (H)      Thyroid disease        MEDICATIONS:   Prior to Admission Medications   Prescriptions Last Dose Informant Patient Reported? Taking?   CYANOCOBALAMIN PO 5/27/2018 at AM Nursing Home Yes Yes   Sig: Take 2,500 mcg by mouth daily   DONEPEZIL HCL PO 5/26/2018 at PM Nursing Home Yes Yes   Sig: Take 10 mg by mouth At Bedtime   FOLIC ACID PO 5/27/2018 at AM Nursing Home Yes Yes   Sig: Take 400 mcg by mouth daily   LEVOTHYROXINE SODIUM PO 5/27/2018 at AM Nursing Home Yes Yes   Sig: Take 125 mcg by mouth daily   LevETIRAcetam (KEPPRA PO) 5/26/2018 at PM Nursing Home Yes Yes   Sig: Take 250 mg by mouth At Bedtime   MEMANTINE HCL PO 5/27/2018 at AM Nursing Home Yes Yes   Sig: Take 10 mg by mouth 2 times daily   VITAMIN D, CHOLECALCIFEROL, PO 5/27/2018 at AM Nursing Home Yes Yes   Sig: Take 1,000 Units by mouth daily   acetaminophen (TYLENOL) 325 MG tablet PRN med Nursing Home No Yes   Sig: Take 2 tablets (650 mg) by mouth every 4 hours as needed for mild pain or fever   amLODIPine (NORVASC) 10 MG tablet 5/27/2018 at AM Nursing Home No Yes   Sig: Take 1 tablet (10 mg) by mouth daily   aspirin 81 MG chewable tablet on HOLD as of 5/22 Nursing Home Yes No   Sig: Take 81 mg by mouth daily   calcium citrate-vitamin D (CITRACAL) 315-250 MG-UNIT TABS per tablet 5/27/2018 at AM  Nursing Home Yes Yes   Sig: Take 1 tablet by mouth 2 times daily   finasteride (PROSCAR) 5 MG tablet 5/27/2018 at AM Nursing Home No Yes   Sig: Take 1 tablet (5 mg) by mouth daily   hydrALAZINE (APRESOLINE) 50 MG tablet 5/27/2018 at AM Nursing Home No Yes   Sig: Take 1 tablet (50 mg) by mouth 3 times daily   pantoprazole (PROTONIX) 40 MG EC tablet 5/27/2018 at AM Nursing Home No Yes   Sig: Take 1 tablet (40 mg) by mouth every morning      Facility-Administered Medications: None       ALLERGIES: No Known Allergies    SOCIAL HISTORY:  Social History   Substance Use Topics     Smoking status: Former Smoker     Smokeless tobacco: Never Used     Alcohol use No       FAMILY HISTORY:  No family history on file.    PHYSICAL EXAM:   General awake alert short of breath  Vital Signs with Ranges  Temp: 98.8  F (37.1  C) Temp src: Axillary BP: 143/82 Pulse: 96 Heart Rate: 80 Resp: 15 SpO2: 98 % O2 Device: BiPAP/CPAP Oxygen Delivery: 10 LPM  I/O last 3 completed shifts:  In: 1760 [P.O.:360; I.V.:500; Other:300]  Out: -     Constitutional: healthy, alert and no distress   Cardiovascular: negative, PMI normal. No lifts, heaves, or thrills. RRR. No murmurs, clicks gallops or rub  Respiratory: negative, Percussion normal. Good diaphragmatic excursion. Lungs clear  Head: Normocephalic. No masses, lesions, tenderness or abnormalities  Neck: Neck supple. No adenopathy. Thyroid symmetric, normal size,, Carotids without bruits.  Abdomen: Abdomen soft, non-tender. BS normal. No masses, organomegaly  SKIN: no suspicious lesions or rashes          ADDITIONAL COMMENTS:   I reviewed the patient's new clinical lab test results.   Recent Labs   Lab Test  05/28/18   1956  05/28/18   0738  05/27/18   2150  05/27/18   1458   05/13/18   0709   05/12/18   0625   05/01/18   1715   WBC   --   12.3*   --   15.3*   --   12.0*   --   11.5*   < >  8.5   HGB  7.7*  6.9*  6.5*  6.3*   < >  6.9*   < >  5.7*  Canceled, Test credited   < >  10.1*   MCV   --    88   --   90   --   87   --   87   < >  88   PLT   --   195   --   268   --   116*   --   151   < >  138*   INR   --    --    --   1.15*   --    --    --   1.26*   --   1.15*    < > = values in this interval not displayed.     Recent Labs   Lab Test  05/28/18   0738  05/27/18   1458  05/15/18   0725   POTASSIUM  4.4  4.6  4.1   CHLORIDE  114*  113*  112*   CO2  22  23  21   BUN  59*  59*  68*   ANIONGAP  10  9  8     Recent Labs   Lab Test  05/13/18   1625  05/10/18   1845  05/02/18   0130  05/01/18   1715   ALBUMIN   --    --    --   2.6*   BILITOTAL   --    --    --   0.3   ALT   --    --    --   11   AST   --    --    --   14   PROTEIN  100*  300*  300*   --        I reviewed the patient's new imaging results.        CONSULTATION ASSESSMENT AND PLAN:    Active Problems:    Acute respiratory failure with hypoxia (H)    Assessment: 89-year-old gentleman with significantly complicated history with multiple active problems including significant shortness of breath possible pneumonia versus CHF with significant fluid overload respiratory failure requiring BiPAP patient is significant respiratory distress and labored breathing without BiPAP patient is on IV antibiotics stage IV-V kidney disease.  Patient has developed anemia findings are worrisome for possible low-grade GI bleed other differential would include anemia due to chronic disease chronic kidney disease.  Patient will need further evaluation with endoscopic evaluation however at this point patient is not stable from cardiorespiratory standpoint.  Patient will not be able to tolerate sedation in the current situation.  I agree with further evaluation and nephrology and fluid management before any endoscopic workup.  If patient's upper GI endoscopy is negative patient will need further evaluation with possible colonoscopy overall patient will be at significantly high risk for complication findings differential diagnosis and different treatment options were  discussed in detail with patient and his girlfriend.  Patient's case was discussed in detail with Dr. Crooks.  I will continue to follow patient closely along with you.  I would recommend further discussion and evaluation for long-term goals and plan including possible palliative care evaluation.           Alex Alcantar MD, [facility]  [facility].  Office: [phone]  Cell : [phone]

## 2018-05-29 NOTE — ETHICS CONSULT
ETHICS CONSULTATION Progress Note  [facility] CCU    The following members of the Ethics Committee participated in this consultation: Elva Rodriguez, Roshni Rodriguez, Donna Mora, Gill Padilla, Felisha Lance, Khanh Gann.  In a review of the pt's EMR, we see an agreement among providers that comfort care is the medically reasonable choice for this pt (who has multi-organ failure in the context of advanced dementia). We further read that aggressive interventions, such as dialysis and intubation, are not expected to provide benefit to this pt.   The result of our consultation is that we find it ethically reasonable and appropriate for providers to move toward comfort care measures for this pt.    We also understand that pt's S.O. has not been willing to discuss these matters with staff. While pt's S.O. has no legal standing as pt's healthcare decision-maker, we do recommend that staff continue to make efforts to interpret these medical decisions to her in any manner that might be beneficial.                                                                                                                                              Khanh Gann M.Div., [facility]  Lead  and Co-Chair of [facility] Biomedical Ethics Committee  Pager [phone]

## 2018-05-29 NOTE — PROGRESS NOTES
[facility]    Hospitalist Progress Note    Assessment & Plan   Yuriy Ghosh is a 89 year old male with a past medical history of HTN, PAD, hypothyroid, seizure disorder, BPH, CKD, CHF, recent hospitalization complicated by GI bleed due to duodenal ulcer who presented with shortness of breath.    Advance care planning  At this time the patient has multiple comorbidities and declining status.  I am concerned that the patient may not recover from this during this hospitalization.  Nephrology does not feel as though the patient is a dialysis catheter and I question if aggressive cares would even be beneficial for this patient.  I feel end of life discussions should be addressed with the patient but unfortunately he is too demented to make decisions regarding this.  He does have a significant other who has POA paperwork but these records to not appear to address medical issues/medical POA decision making and look to be just financial POA.  It appears that the patient has a son who is estranged who may actually need to be contacted regarding end of life care.  End of life discussions have been brought up with the significant other during previous admission and she refused.  I spoke to Roshni with social work who stated that the significant other also has significant cognitive deficits noted on previous admissions and I wonder if she has decisional making capacity.    - Ethics consult placed given the difficult situation regarding who the medical POA should be, whether or not the significant other who was designated financial POA is decisional and if not what steps need to be taken   Palliative care consult placed as well RE goals of care discussion     ADDENDUM:  Palliative care met with pt's significant other and he was transitioned to comfort care only. Comfort care orders initiated. Will tranfer pt to 88 with comfort cares only      Acute hypoxic respiratory failure  Suspected HFrEF exacerbation    Possible HCAP  Pulmonary HTN   Hypoxia to 84% on room air upon arrival.  Suspect multifactorial including possible pneumonia (new leukocytosis with left shift, though afebrile, lactate of 2.9) and acute on chronic CHF (pro-BNP >48K with marked lower extremity edema).  CXR showing bilateral patchy opacities though procalcitonin was not elevated   Last echo was 5/4/18 with an EF of 45-50% and global hypokinesia.  1-2+ MR, 1-2+ TR, moderate AS and pulmonary HTN also noted.  Not currently on beta blocker due to intolerance and not on ACEi/ARB due to CKD   - BiPAP PRN and wean O2 as needed  - Continue zosyn for now.  Will likely discontinue tomorrow as my suspicion for pneumonia is low and feel fluid overload is more likely the etiology of his hypoxia.    - Nephrology have him on  low dose 20 mg IV Lasix daily   - Strict I/Os and daily weights     SARAHI on CKD stage IV  Evaluated by Nephrology during last admission, suspect progression of CKD due to HTN and vascular disease.  Admission Cr 4.3, which is largely stable from recent discharge.  - Continue to monitor  - Nephrology following and appreciate their assistance.  Do not feel patient is a dialysis candidate   cr worse at 4.6 from 4.3 on admission   Acute blood loss anemia  on chronic anemia  Experienced GIB due to duodenal ulcer on 5/12/18.  Discharge hgb 7.8, admission hgb 6.3.  He has received 2 units of PRBCs and only radha to 6.9.  No known melena or hematochezia but slow GI bleed can not be excluded.  Could also be due to poor RBC production   - he continuous to have dark stools   Received 4units of PRBCS so far   - GI consult placed and at this time holding off on repeat endoscopy due to the respiratory failure   - on clear liquid diet per GI      HTN  - PTA Amlodipine and Hydralazine     Hypothyroid  - Continue PTA levothyroxine     Seizure disorder  - Continue PTA Keppra     Dementia  - Continue PTA donepezil and memantine  - PRN seroquel for  agitation     BPH  - Continue PTA finasteride    # Pain Assessment:  Current Pain Score 5/29/2018   Patient currently in pain? denies   Pain score (0-10) -   Pain location -   Yuriy s pain level was assessed and he currently denies pain.        DVT Prophylaxis: Pneumatic Compression Devices  Code Status: DNR/DNI    Disposition: Expected discharge TBD.  Patient is still requiring BiPAP PRN.  Palliative care and Ethics consults placed   >35minutes were spent in taking care of him today. Half of which was spent in multidisciplinary rounds in collaboration of whitney Connell MD       Interval History   Patient seen and examined.  Keeps asking for water. continuous to have GI bleed with black stools     -Data reviewed today: I reviewed all new labs and imaging results over the last 24 hours. I personally reviewed no images or EKG's today.    Physical Exam   Temp: 98  F (36.7  C) Temp src: Oral BP: 150/72 Pulse: 82 Heart Rate: 73 Resp: 22 SpO2: 96 % O2 Device: Oxymask Oxygen Delivery: 8 LPM  Vitals:    05/28/18 0500 05/29/18 0608   Weight: 69.5 kg (153 lb 3.5 oz) 65.6 kg (144 lb 10 oz)     Vital Signs with Ranges  Temp:  [97.5  F (36.4  C)-99.3  F (37.4  C)] 98  F (36.7  C)  Pulse:  [82-96] 82  Heart Rate:  [42-98] 73  Resp:  [11-27] 22  BP: (126-162)/(71-99) 150/72  FiO2 (%):  [40 %] 40 %  SpO2:  [95 %-100 %] 96 %  I/O last 3 completed shifts:  In: 1660 [P.O.:360; I.V.:100; Other:300]  Out: -     Constitutional: Pleasantly demented.  Awake and alert  Respiratory:bibasilar crackles. Using accesory muscles of respiration   Cardiovascular: Regular rate and rhythm, normal S1 and S2, and 2+ murmur noted  GI: Normal bowel sounds, soft, non-distended, non-tender  Skin/Integumen: No rashes, no cyanosis  MSK: Bilateral lower extremity edema     Medications     - MEDICATION INSTRUCTIONS -       pantoprazole (PROTONIX) infusion ADULT/PEDS GREATER than or EQUAL to 45 kg 8 mg/hr (05/29/18 9755)     - MEDICATION INSTRUCTIONS -        - MEDICATION INSTRUCTIONS -         amLODIPine  10 mg Oral Daily     aspirin  81 mg Oral Daily     calcium citrate-vitamin D  1 tablet Oral BID     cholecalciferol  1,000 Units Oral Daily     donepezil (ARICEPT) tablet 10 mg  10 mg Oral At Bedtime     finasteride  5 mg Oral Daily     furosemide  20 mg Intravenous Daily     hydrALAZINE  50 mg Oral TID     levETIRAcetam (KEPPRA) tablet 250 mg  250 mg Oral At Bedtime     levothyroxine (SYNTHROID/LEVOTHROID) tablet 125 mcg  125 mcg Oral QAM AC     memantine (NAMENDA) tablet 10 mg  10 mg Oral BID     piperacillin-tazobactam  2.25 g Intravenous Q6H     sodium chloride (PF)  3 mL Intracatheter Q8H       Data     Recent Labs  Lab 05/29/18  0510 05/29/18  0025 05/28/18  1956 05/28/18  0738 05/28/18  0210 05/27/18  2150 05/27/18  1810 05/27/18  1458   WBC  --   --   --  12.3*  --   --   --  15.3*   HGB 7.0* 7.8* 7.7* 6.9*  --  6.5*  --  6.3*   MCV  --   --   --  88  --   --   --  90   PLT  --   --   --  195  --   --   --  268   INR  --   --   --   --   --   --   --  1.15*   *  --   --  146*  --   --   --  145*   POTASSIUM 4.3  --   --  4.4  --   --   --  4.6   CHLORIDE 118*  --   --  114*  --   --   --  113*   CO2 23  --   --  22  --   --   --  23   BUN 71*  --   --  59*  --   --   --  59*   CR 4.60*  --   --  4.32*  --   --   --  4.37*   ANIONGAP 8  --   --  10  --   --   --  9   TEMO 7.3*  --   --  7.5*  --   --   --  8.1*   GLC 90  --   --  95  --   --   --  145*   TROPI  --   --   --   --  0.092* 0.062* 0.071* 0.066*       Imaging:   No results found for this or any previous visit (from the past 24 hour(s)).

## 2018-05-29 NOTE — PROGRESS NOTES
SW:  D:  Attempted to find patient's son, Vito Ghosh via the white pages, [employer] Liaison, and Facebook.  There are many, many people with this same name.  Without any specific information we have been unsuccessful to find patient's son.  P:  Will continue to follow.

## 2018-05-29 NOTE — PROVIDER NOTIFICATION
Notified: On Call Hospitalist - Dr Knight    05/28/18 8:26 PM    Notification Interaction: Page via answering service    Purpose of Notification: Pt had medium sized soft stool with bright red blood. This is his first BM since admission.     Orders Received: Spoke with MD who will place order to trend hemoglobins and GI consult orders.

## 2018-05-29 NOTE — PLAN OF CARE
Problem: Cardiac: Heart Failure (Adult)  Goal: Signs and Symptoms of Listed Potential Problems Will be Absent, Minimized or Managed (Cardiac: Heart Failure)  Signs and symptoms of listed potential problems will be absent, minimized or managed by discharge/transition of care (reference Cardiac: Heart Failure (Adult) CPG).   Outcome: Declining  Heart Failure Care Pathway  GOALS TO BE MET BEFORE DISCHARGE:    1. Decrease congestion and/or edema with diuretic therapy to achieve near      optimal volume status.            Dyspnea improved:  No, please explain: Pt remains on bipap            Edema improved:     No, please explain: continues to have 2+ lower ex edema        Net I/O and Weights since admission:          04/28 2300 - 05/28 2259  In: 2060 [P.O.:360; I.V.:500]  Out: -   Net: 2060            Vitals:    05/28/18 0500   Weight: 69.5 kg (153 lb 3.5 oz)       2.  O2 sats > 92% on RA or at prior home O2 therapy level.          Current oxygenation status:       SpO2: 99 %         O2 Device: BiPAP/CPAP,  Oxygen Delivery: 10 LPM         Able to wean O2 this shift to keep sats > 92%:  No, pt remains on bipap       Does patient use Home O2? No    3.  Tolerates ambulation and mobility near baseline: Pt on bedrest        How many times did the patient ambulate with nursing staff this shift? 0    Please review the Heart Failure Care Pathway for additional HF goal outcomes.    VSS. Tele 100% VPaced. Pt remains on Bipap. Had bloody stool x2 with the second occurrence being mostly fluid treasure blood. Pt received 2 units of blood today with some improvement in Hgb which we will be rechecking q4h. Protonix gtt started. Pt to have ethics meeting tomorrow regarding plans for care. Continue to monitor.     Angi Sterling RN  5/28/2018

## 2018-05-29 NOTE — PROGRESS NOTES
Pt stable on Bipap all shift, no issues overnight. Bipap alarm level set at 10.  5/29/2018  Uma Epps

## 2018-05-29 NOTE — PROVIDER NOTIFICATION
MD Notification    Notified Person: MD    Notified Person Name: Dr. Alcantar    Notification Date/Time: 5-29-18 5798    Notification Interaction: phone discussion    Purpose of Notification: pt off  Bipap, tolerating oxymask, blood infusing due to dropping hgb, asking about advancing diet to clear liquid    Orders Received: Ok to advance to clear liquid, awaiting ethics consult on if pt will transition to comfort cares    Comments:

## 2018-05-29 NOTE — PROGRESS NOTES
Cross cover    Paged that patient had bloody stool.  Vitals are stable and hgb now above 7. Will continue q6h hgb checks.  Appears GI consult already entered.  Continue PPI as ordered.

## 2018-05-29 NOTE — PROVIDER NOTIFICATION
"Notified: Dr Real on call hospitalist    05/28/18 10:15 PM    Notification Interaction: page via answer    Purpose of Notification: Pt had large amt treasure blood with scant amt or \"chunk\" of stool.    Orders Received: MD will place order to trend Hgb q4h and start protonix gtt.     "

## 2018-05-29 NOTE — PLAN OF CARE
Problem: Patient Care Overview  Goal: Plan of Care/Patient Progress Review  Outcome: Declining  Alert, disoriented to place, time and situation. Tele: SR/SD w/ 1st degree AVB. Pt weaned off Bipap, 8-10 L Oxymask, dyspenic, pt denies SOB. Bloody stool noted x4, 2 units of blood given for hgb of 7.0. Recheck 9.4. Skin red blanchable noted on coccyx, turned and repo q 2 hours. Pivot to bedside commode x3 w/ assist of 2. Fall risk. Incontinent of urine. Pt tolerates liquid in upright position. Pt repeated requests for water throughout shift for dry mouth. Palliative care met w/ significant other, transfer to comfort cares. Significant other in agreement. Ethics committee in agreement w/ comfort cares. Social work involved. Dilaudid given x1 for increased work of breathing. Report called to nurse on station 88. Pt transferred w/ all belongings. Significant other at bedside and agreement w/ plan of care.

## 2018-05-29 NOTE — PLAN OF CARE
Problem: Patient Care Overview  Goal: Plan of Care/Patient Progress Review  OT: Reviewed chart and talked to nursing.  Pt has been on Bipap, had regular bleeding needing some transfusions, and is on bedrest.  Pt will also have a ethics committee meeting today to look at treatment plan options.  Will hold eval today and check on status as able.

## 2018-05-30 PROCEDURE — 12000000 ZZH R&B MED SURG/OB

## 2018-05-30 PROCEDURE — 25000132 ZZH RX MED GY IP 250 OP 250 PS 637: Mod: GY | Performed by: NURSE PRACTITIONER

## 2018-05-30 PROCEDURE — 99232 SBSQ HOSP IP/OBS MODERATE 35: CPT | Performed by: INTERNAL MEDICINE

## 2018-05-30 PROCEDURE — A9270 NON-COVERED ITEM OR SERVICE: HCPCS | Mod: GY | Performed by: HOSPITALIST

## 2018-05-30 PROCEDURE — 25000132 ZZH RX MED GY IP 250 OP 250 PS 637: Mod: GY | Performed by: INTERNAL MEDICINE

## 2018-05-30 PROCEDURE — A9270 NON-COVERED ITEM OR SERVICE: HCPCS | Mod: GY | Performed by: NURSE PRACTITIONER

## 2018-05-30 PROCEDURE — A9270 NON-COVERED ITEM OR SERVICE: HCPCS | Mod: GY | Performed by: INTERNAL MEDICINE

## 2018-05-30 PROCEDURE — 99207 ZZC CDG-MDM COMPONENT: MEETS LOW - DOWN CODED: CPT | Performed by: INTERNAL MEDICINE

## 2018-05-30 PROCEDURE — 25000132 ZZH RX MED GY IP 250 OP 250 PS 637: Mod: GY | Performed by: HOSPITALIST

## 2018-05-30 RX ORDER — ACETAMINOPHEN 650 MG/1
650 SUPPOSITORY RECTAL EVERY 4 HOURS PRN
Qty: 60 SUPPOSITORY | Refills: 0 | Status: SHIPPED | OUTPATIENT
Start: 2018-05-30

## 2018-05-30 RX ORDER — LORAZEPAM 2 MG/ML
1 CONCENTRATE ORAL
Qty: 15 ML | Refills: 0 | Status: SHIPPED | OUTPATIENT
Start: 2018-05-30

## 2018-05-30 RX ORDER — BISACODYL 10 MG
10 SUPPOSITORY, RECTAL RECTAL DAILY PRN
Qty: 30 SUPPOSITORY | Refills: 0 | Status: SHIPPED | OUTPATIENT
Start: 2018-05-30

## 2018-05-30 RX ORDER — HYDROMORPHONE HYDROCHLORIDE 10 MG/ML
2-4 INJECTION INTRAMUSCULAR; INTRAVENOUS; SUBCUTANEOUS
Qty: 30 ML | Refills: 0 | Status: SHIPPED | OUTPATIENT
Start: 2018-05-30

## 2018-05-30 RX ORDER — ATROPINE SULFATE 10 MG/ML
1-2 SOLUTION/ DROPS OPHTHALMIC
Qty: 1 BOTTLE | Refills: 0 | Status: SHIPPED | OUTPATIENT
Start: 2018-05-30

## 2018-05-30 RX ORDER — FUROSEMIDE 40 MG
40 TABLET ORAL DAILY
Qty: 30 TABLET | Refills: 0 | Status: SHIPPED | OUTPATIENT
Start: 2018-05-31

## 2018-05-30 RX ORDER — HALOPERIDOL 2 MG/ML
2 SOLUTION ORAL EVERY 6 HOURS PRN
Qty: 60 ML | Refills: 0 | Status: SHIPPED | OUTPATIENT
Start: 2018-05-30

## 2018-05-30 RX ORDER — AMOXICILLIN 250 MG
1 CAPSULE ORAL 2 TIMES DAILY PRN
Qty: 100 TABLET | Refills: 0 | Status: SHIPPED | OUTPATIENT
Start: 2018-05-30

## 2018-05-30 RX ORDER — FUROSEMIDE 40 MG
40 TABLET ORAL DAILY
Status: DISCONTINUED | OUTPATIENT
Start: 2018-05-30 | End: 2018-06-01 | Stop reason: HOSPADM

## 2018-05-30 RX ADMIN — HYDROMORPHONE HYDROCHLORIDE 2 MG: 10 INJECTION, SOLUTION INTRAMUSCULAR; INTRAVENOUS; SUBCUTANEOUS at 20:47

## 2018-05-30 RX ADMIN — LEVOTHYROXINE SODIUM 125 MCG: 100 TABLET ORAL at 07:40

## 2018-05-30 RX ADMIN — HYDROMORPHONE HYDROCHLORIDE 2 MG: 10 INJECTION, SOLUTION INTRAMUSCULAR; INTRAVENOUS; SUBCUTANEOUS at 08:40

## 2018-05-30 RX ADMIN — Medication 1 MG: at 05:54

## 2018-05-30 RX ADMIN — FUROSEMIDE 40 MG: 40 TABLET ORAL at 10:26

## 2018-05-30 RX ADMIN — ACETAMINOPHEN 650 MG: 325 TABLET ORAL at 04:32

## 2018-05-30 RX ADMIN — HYDROMORPHONE HYDROCHLORIDE 2 MG: 10 INJECTION, SOLUTION INTRAMUSCULAR; INTRAVENOUS; SUBCUTANEOUS at 13:40

## 2018-05-30 NOTE — PROGRESS NOTES
[facility]    Hospitalist Progress Note    Assessment & Plan   Yuriy Ghosh is a 89 year old male with a past medical history of HTN, PAD, hypothyroid, seizure disorder, BPH, CKD, CHF, recent hospitalization complicated by GI bleed due to duodenal ulcer who presented with shortness of breath.    Advance care plan  Palliative care met with pt's significant other and he was transitioned to comfort care only on 5/29/18. Comfort care orders initiated.      Acute hypoxic respiratory failure  Suspected HFrEF exacerbation   Possible HCAP  Pulmonary HTN   Comfort care only  Continue lasix 40mg PO daily for comfort    SARAHI on CKD stage IV  Evaluated by Nephrology during last admission, suspect progression of CKD due to HTN and vascular disease.   Comfort care only   Acute blood loss anemia  on chronic anemia  Experienced GIB due to duodenal ulcer on 5/12/18.  Discharge hgb 7.8, admission hgb 6.3.  He has received 2 units of PRBCs and only radha to 6.9.  No known melena or hematochezia but slow GI bleed can not be excluded.  Could also be due to poor RBC production   - he continuous to have dark stools   Received 4units of PRBCS so far   - GI consult placed and at this time holding off on repeat endoscopy due to the respiratory failure   - comfort care only      HTN  Comfort care only      Hypothyroid  - Continue PTA levothyroxine     Seizure disorder  - Continue PTA Keppra     Dementia  - Continue PTA donepezil and memantine  - PRN seroquel for agitation     BPH  - Continue PTA finasteride    # Pain Assessment:  Current Pain Score 5/30/2018   Patient currently in pain? other (see comments)   Pain score (0-10) -   Pain location -   Yuriy knapp pain level was assessed and he currently denies pain.        DVT Prophylaxis: Pneumatic Compression Devices  Code Status: DNR/DNI with comfort care only     Disposition: discharge to [facility] or hospice facility when bed is available   >35minutes were spent in taking care of  him today. Half of which was spent in multidisciplinary rounds in collaboration of whitney Connell MD       Interval History   Patient seen and examined.  Appears comfortable today     -Data reviewed today: I reviewed all new labs and imaging results over the last 24 hours. I personally reviewed no images or EKG's today.    Physical Exam   Temp: 100.3  F (37.9  C) (per significant other request) Temp src: Oral BP: (!) 132/94   Heart Rate: 89 Resp: 24 SpO2: 100 % O2 Device: Nasal cannula (patient taking mask, denies short of breath) Oxygen Delivery: 2 LPM ( suggested 2LPM/NC for comfort)  Vitals:    05/28/18 0500 05/29/18 0608   Weight: 69.5 kg (153 lb 3.5 oz) 65.6 kg (144 lb 10 oz)     Vital Signs with Ranges  Temp:  [98.1  F (36.7  C)-100.3  F (37.9  C)] 100.3  F (37.9  C)  Heart Rate:  [] 89  Resp:  [22-30] 24  BP: (132-161)/(92-94) 132/94  SpO2:  [97 %-100 %] 100 %  I/O last 3 completed shifts:  In: 2050 [P.O.:1350; I.V.:100]  Out: 325 [Urine:325]    Constitutional: Pleasantly demented.  Awake and alert  Respiratory:bibasilar crackles.   Cardiovascular: Regular rate and rhythm, normal S1 and S2, and 2+ murmur noted  GI: Normal bowel sounds, soft, non-distended, non-tender  Skin/Integumen: No rashes, no cyanosis  MSK: Bilateral lower extremity edema     Medications     - MEDICATION INSTRUCTIONS -       - MEDICATION INSTRUCTIONS -         furosemide  40 mg Oral Daily     levETIRAcetam (KEPPRA) tablet 250 mg  250 mg Oral At Bedtime     sodium chloride (PF)  3 mL Intracatheter Q8H       Data     Recent Labs  Lab 05/29/18  1300 05/29/18  0510 05/29/18  0025  05/28/18  0738 05/28/18  0210 05/27/18  2150 05/27/18  1810 05/27/18  1458   WBC  --   --   --   --  12.3*  --   --   --  15.3*   HGB 9.4* 7.0* 7.8*  < > 6.9*  --  6.5*  --  6.3*   MCV  --   --   --   --  88  --   --   --  90   PLT  --   --   --   --  195  --   --   --  268   INR  --   --   --   --   --   --   --   --  1.15*   NA  --  149*  --    --  146*  --   --   --  145*   POTASSIUM  --  4.3  --   --  4.4  --   --   --  4.6   CHLORIDE  --  118*  --   --  114*  --   --   --  113*   CO2  --  23  --   --  22  --   --   --  23   BUN  --  71*  --   --  59*  --   --   --  59*   CR  --  4.60*  --   --  4.32*  --   --   --  4.37*   ANIONGAP  --  8  --   --  10  --   --   --  9   TEMO  --  7.3*  --   --  7.5*  --   --   --  8.1*   GLC  --  90  --   --  95  --   --   --  145*   TROPI  --   --   --   --   --  0.092* 0.062* 0.071* 0.066*   < > = values in this interval not displayed.    Imaging:   No results found for this or any previous visit (from the past 24 hour(s)).

## 2018-05-30 NOTE — PROGRESS NOTES
SPIRITUAL HEALTH SERVICES Progress Note  [facility]     Brief visit with pt and his partner, Eva, for support.  I met them in the heart center two days ago.    Pt has now transitioned to comfort care and transferred from heart center to .  Pt was alert but confused.  Eva expressed concern about pt's breathing, and explained to me that the doctors are unable to treat his problems.  She seemed upset, but didn't wish to have further conversation right now.      SH team is available if needs arise.                                                                                                                                                 Piper Morales M.A.  Staff   Pager [phone]  Phone [phone]

## 2018-05-30 NOTE — PLAN OF CARE
Problem: Patient Care Overview  Goal: Plan of Care/Patient Progress Review  Outcome: No Change  Pt alert to self only. Transitioned to comfort cares (5/29). Pt's significant other requested NA to take temperature-pt temp 100.3. Writer gave prn tylenol for comfort. Denied pain overnight. Coccyx red/blanchable-turn/repo q2hrs for comfort/pressure relief. Incontinent of urine. Pt significant other present at bedside overnight. Continue to monitor.

## 2018-05-30 NOTE — PLAN OF CARE
Problem: Patient Care Overview  Goal: Plan of Care/Patient Progress Review  Outcome: No Change  Pt Alert to self only, recognizes significant other. VS deferred transitioned to comfort cares today. On 10L oxymask. Denies pain. PIV's SL. Repo q 2. Redness on coccyx. Per report up w/ 2. Regular diet, poor appetite. Plan pending hospice placement. Will continue to keep comfortable

## 2018-05-30 NOTE — PROGRESS NOTES
[facility]  Gastroenterology Progress Note     Yuriy Ghosh MRN# [id number]   YOB: 1926 Age: 89 year old          Assessment and Plan:     Acute respiratory failure with hypoxia (H)  Patient is in significant respiratory distress patient is still on 10 L oxygen with mask patient is short of breath patient is feeling weak patient is accompanied by his girlfriend findings were discussed in detail with patient and family patient has significant respiratory decompensation and significantly advanced kidney disease patient's last hemoglobin was 9.4 patient has not had any further bloody bowel movements.  Patient is on clear liquids and drinking patient is complaining of being thirsty.  At this point I would recommend the patient to be monitored clinically patient is not stable enough for any endoscopic intervention or sedation risks and complications were discussed with family.  Agree for further evaluation for palliative care and possible comfort care overall prognosis is significantly limited and guarded.  Patient's current status was explained to patient's significant other.            Hospital-acquired pneumonia  Anemia due to blood loss, acute  Acute systolic congestive heart failure (H)  Acute renal injury (H)      Interval History:   denies chest pain, denies abdominal pain and has had a bowel movement in the last 24 hours              Review of Systems:   C: NEGATIVE for fever, chills, change in weight  E/M: NEGATIVE for ear, mouth and throat problems  R: NEGATIVE for significant cough or SOB  CV: NEGATIVE for chest pain, palpitations or peripheral edema             Medications:   I have reviewed this patient's current medications    donepezil (ARICEPT) tablet 10 mg  10 mg Oral At Bedtime     finasteride  5 mg Oral Daily     furosemide  20 mg Intravenous Daily     levETIRAcetam (KEPPRA) tablet 250 mg  250 mg Oral At Bedtime     levothyroxine (SYNTHROID/LEVOTHROID) tablet 125 mcg   125 mcg Oral QAM AC     memantine (NAMENDA) tablet 10 mg  10 mg Oral BID     sodium chloride (PF)  3 mL Intracatheter Q8H                  Physical Exam:   Vitals were reviewed  Vital Signs with Ranges  Temp:  [97.5  F (36.4  C)-99.3  F (37.4  C)] 98.1  F (36.7  C)  Pulse:  [82-89] 82  Heart Rate:  [] 89  Resp:  [12-30] 24  BP: (132-162)/() 132/94  FiO2 (%):  [40 %] 40 %  SpO2:  [96 %-100 %] 100 %  I/O last 3 completed shifts:  In: 2075 [P.O.:1110; I.V.:65]  Out: 325 [Urine:325]  Cardiovascular: negative, PMI normal. No lifts, heaves, or thrills. RRR. No murmurs, clicks gallops or rub  Respiratory: poor air movement and bilateral rales.  Head: Normocephalic. No masses, lesions, tenderness or abnormalities  Abdomen: Abdomen soft, non-tender. BS normal. No masses, organomegaly  SKIN: no suspicious lesions or rashes           Data:   I reviewed the patient's new clinical lab test results.   Recent Labs   Lab Test  05/29/18   1300  05/29/18   0510  05/29/18   0025   05/28/18   0738   05/27/18   1458   05/13/18   0709   05/12/18   0625   05/01/18   1715   WBC   --    --    --    --   12.3*   --   15.3*   --   12.0*   --   11.5*   < >  8.5   HGB  9.4*  7.0*  7.8*   < >  6.9*   < >  6.3*   < >  6.9*   < >  5.7*  Canceled, Test credited   < >  10.1*   MCV   --    --    --    --   88   --   90   --   87   --   87   < >  88   PLT   --    --    --    --   195   --   268   --   116*   --   151   < >  138*   INR   --    --    --    --    --    --   1.15*   --    --    --   1.26*   --   1.15*    < > = values in this interval not displayed.     Recent Labs   Lab Test  05/29/18   0510  05/28/18   0738  05/27/18   1458   POTASSIUM  4.3  4.4  4.6   CHLORIDE  118*  114*  113*   CO2  23  22  23   BUN  71*  59*  59*   ANIONGAP  8  10  9     Recent Labs   Lab Test  05/13/18   1625  05/10/18   1845  05/02/18   0130  05/01/18   1715   ALBUMIN   --    --    --   2.6*   BILITOTAL   --    --    --   0.3   ALT   --    --    --    11   AST   --    --    --   14   PROTEIN  100*  300*  300*   --        I reviewed the patient's new imaging results.    All laboratory data reviewed  All imaging studies reviewed by me.    Alex Alcantar MD,  5/29/2018  [facility]  Office : [phone]  Cell: [phone]

## 2018-05-30 NOTE — PROGRESS NOTES
Care Transition Initial Assessment - KIMBERLY  Reason For Consult: discharge planning  Met with: Significant Other  Active Problems:    Acute respiratory failure with hypoxia (H)       DATA  Lives With: significant other  Living Arrangements: house  Description of Support System: Supportive, Involved  Who is your support system?: Significant Other  Support Assessment: Adequate family and caregiver support, Adequate social supports. Patient has son that previous  was unable to obtain contact information for or locate.   Identified issues/concerns regarding health management: Per social service protocol for discharge planning, patient was admitted on 5/27/18 with acute respiratory failure with hypoxia.  Reviewed chart and spoke with patient's significant other regarding discharge planning.  Per discussion with MD, patient would benefit from an inpatient hospice house. Writer spoke with significant other and provided list for hospice houses.  Spouse said she would like to have patient go to [facility] and said that she would probably tour the facility.  Writer provided information on private pay costs and she did not feel that she would be able to afford to send patient there.  Writer also provided a SNF list and SO chose for referrals to be sent to [facility].  She will look at alternative options and inform SW.  SW sent referral to [facility]. SW discussed that LTC facilities would also be private pay.  SO in understanding.  SW will continue to assist with discharge planning.            Quality Of Family Relationships: supportive, involved       ASSESSMENT  Cognitive Status:  Alert.   Concerns to be addressed: Discharge planning.      PLAN  Financial costs for the patient includes: Private pay services for room and board cares.   Patient given options and choices for discharge: Provided SO with LTC and hospice list.   Patient/family is agreeable to the plan?  Yes  Patient Goals and Preferences: TBD  Patient  anticipates discharging to:  IDALIA Conn, LGSW        Addendum:  Writer spoke with SO regarding hospice agencies.  SW informed her that [facility] has a hospice agency.  She said that she would like to look at the options and let SW know when she comes back tonight.  SW said that she can bring in the LTC list and give to nurse for SW.  She will also let SW know about which hospice agency she wants to go with.  SW provided educations on hospice services coming to where patient is living whether it be a LTC facility, hospice house or wherever a patient is living. KIMBERLY will continue to follow for discharge planning.

## 2018-05-30 NOTE — PLAN OF CARE
Problem: Patient Care Overview  Goal: Plan of Care/Patient Progress Review  Outcome: Declining  Will not leave O2 on, had mask this morning that he kept removing. Tried NC which he still takes off. Denied shortness of breath this AM, stated he was  a little this afternoon, dilaudid 2 mg oral concentrate given x 2. Has dozed on and off, denies pain. Ate a little breakfast, denies nausea. SO here and making some facility choices for Hospice care, d/c when arrangements made.

## 2018-05-31 PROCEDURE — 25000132 ZZH RX MED GY IP 250 OP 250 PS 637: Mod: GY | Performed by: NURSE PRACTITIONER

## 2018-05-31 PROCEDURE — 25000132 ZZH RX MED GY IP 250 OP 250 PS 637: Mod: GY | Performed by: INTERNAL MEDICINE

## 2018-05-31 PROCEDURE — A9270 NON-COVERED ITEM OR SERVICE: HCPCS | Mod: GY | Performed by: HOSPITALIST

## 2018-05-31 PROCEDURE — 99232 SBSQ HOSP IP/OBS MODERATE 35: CPT | Performed by: INTERNAL MEDICINE

## 2018-05-31 PROCEDURE — A9270 NON-COVERED ITEM OR SERVICE: HCPCS | Mod: GY | Performed by: INTERNAL MEDICINE

## 2018-05-31 PROCEDURE — 25000132 ZZH RX MED GY IP 250 OP 250 PS 637: Mod: GY | Performed by: HOSPITALIST

## 2018-05-31 PROCEDURE — A9270 NON-COVERED ITEM OR SERVICE: HCPCS | Mod: GY | Performed by: NURSE PRACTITIONER

## 2018-05-31 PROCEDURE — 12000000 ZZH R&B MED SURG/OB

## 2018-05-31 RX ADMIN — Medication 1 MG: at 17:36

## 2018-05-31 RX ADMIN — HYDROMORPHONE HYDROCHLORIDE 2 MG: 10 INJECTION, SOLUTION INTRAMUSCULAR; INTRAVENOUS; SUBCUTANEOUS at 02:01

## 2018-05-31 RX ADMIN — FUROSEMIDE 40 MG: 40 TABLET ORAL at 08:54

## 2018-05-31 RX ADMIN — Medication 1 MG: at 21:23

## 2018-05-31 RX ADMIN — HYDROMORPHONE HYDROCHLORIDE 2 MG: 10 INJECTION, SOLUTION INTRAMUSCULAR; INTRAVENOUS; SUBCUTANEOUS at 23:54

## 2018-05-31 RX ADMIN — LEVETIRACETAM 250 MG: 250 TABLET, FILM COATED ORAL at 21:23

## 2018-05-31 NOTE — PLAN OF CARE
Problem: Patient Care Overview  Goal: Plan of Care/Patient Progress Review  Outcome: No Change     Somnolent, unable to follow commands. Comfort cares. PO dilaudid given x1 for labored breathing, effective. Repositioning q2h. Incontinent. Discharge pending placement. Wife at bedside. Cont to monitor and support.

## 2018-05-31 NOTE — PROGRESS NOTES
SW Note:    I: SW spoke with SO and patient about sending additional referrals out to agencies.  They said that they were not familiar with other facilities and wanted SW to send out to places close to the hospital which will be easier for SO to travel to.  SW sent referrals to [facility], [facility], [facility], [facility], [facility], [facility] and [facility].  [facility] called back and said they do not have any LTC Hospice beds available at this time.  SW talked with patient and SO about hospice services and they feel that it will be a nice benefit for patient to have and are willing to talk with  hospice liaison.  Appointment time requested for 1400 on 6/1/18. Informed hospice liaison and confirmed.    P: SW will continue to assist and follow with discharge planning.    IDALIA Ramirez, LGSW

## 2018-05-31 NOTE — PLAN OF CARE
Problem: Dying Patient, Actively (Adult)  Goal: Comfort/Pain Control  Patient will demonstrate the desired outcomes by discharge/transition of care.   Outcome: No Change  Comfort cares. Lethargic and sleeping comfortably but awakens to name, disoriented. T/R, oral cares, O2 by NC for comfort. Incontinent. Dilaudid given for increased WOB, effective. Pt denies pain otherwise. Plan for d/c w/ hospice pending, nrsng cont to monitor.

## 2018-05-31 NOTE — PROGRESS NOTES
[facility]    Hospitalist Progress Note    Assessment & Plan   Yuriy Ghosh is a 89 year old male with a past medical history of HTN, PAD, hypothyroid, seizure disorder, BPH, CKD, CHF, recent hospitalization complicated by GI bleed due to duodenal ulcer who presented with shortness of breath.    Advance care plan  Palliative care met with pt's significant other and he was transitioned to comfort care only on 5/29/18. Comfort care orders initiated. Awaiting placement now      Acute hypoxic respiratory failure  Suspected HFrEF exacerbation   Possible HCAP  Pulmonary HTN   Comfort care only  Continue lasix 40mg PO daily for comfort    SARAHI on CKD stage IV  Evaluated by Nephrology during last admission, suspect progression of CKD due to HTN and vascular disease.   Comfort care only   Acute blood loss anemia  on chronic anemia  Experienced GIB due to duodenal ulcer on 5/12/18.  Discharge hgb 7.8, admission hgb 6.3.  He has received 2 units of PRBCs and only radha to 6.9.  No known melena or hematochezia but slow GI bleed can not be excluded.  Could also be due to poor RBC production   - he continuous to have dark stools   Received 4units of PRBCS so far   - GI consult placed and at this time holding off on repeat endoscopy due to the respiratory failure   - comfort care only      HTN  Comfort care only      Hypothyroid  - Continue PTA levothyroxine     Seizure disorder  - Continue PTA Keppra     Dementia  - Continue PTA donepezil and memantine  - PRN seroquel for agitation     BPH  - Continue PTA finasteride    # Pain Assessment:  Current Pain Score 5/31/2018   Patient currently in pain? denies   Pain score (0-10) -   Pain location -   Yuriy knapp pain level was assessed and he currently denies pain.        DVT Prophylaxis: Pneumatic Compression Devices  Code Status: DNR/DNI with comfort care only     Disposition: discharge to TCu or hospice facility when bed is available     Damaris Connell MD        Interval History   Patient seen and examined.  Appears comfortable today     -Data reviewed today: I reviewed all new labs and imaging results over the last 24 hours. I personally reviewed no images or EKG's today.    Physical Exam            Resp: 20        Vitals:    05/28/18 0500 05/29/18 0608   Weight: 69.5 kg (153 lb 3.5 oz) 65.6 kg (144 lb 10 oz)     Vital Signs with Ranges  Resp:  [20-24] 20  I/O last 3 completed shifts:  In: 100 [P.O.:100]  Out: -     Constitutional: Pleasantly demented.  Awake and alert  Respiratory: CTA, anteriorly   Cardiovascular: Regular rate and rhythm, normal S1 and S2, and 2+ murmur noted  GI: Normal bowel sounds, soft, non-distended, non-tender  Skin/Integumen: No rashes, no cyanosis  MSK: Bilateral lower extremity edema     Medications     - MEDICATION INSTRUCTIONS -       - MEDICATION INSTRUCTIONS -         furosemide  40 mg Oral Daily     levETIRAcetam (KEPPRA) tablet 250 mg  250 mg Oral At Bedtime       Data     Recent Labs  Lab 05/29/18  1300 05/29/18  0510 05/29/18  0025  05/28/18  0738 05/28/18  0210 05/27/18  2150 05/27/18  1810 05/27/18  1458   WBC  --   --   --   --  12.3*  --   --   --  15.3*   HGB 9.4* 7.0* 7.8*  < > 6.9*  --  6.5*  --  6.3*   MCV  --   --   --   --  88  --   --   --  90   PLT  --   --   --   --  195  --   --   --  268   INR  --   --   --   --   --   --   --   --  1.15*   NA  --  149*  --   --  146*  --   --   --  145*   POTASSIUM  --  4.3  --   --  4.4  --   --   --  4.6   CHLORIDE  --  118*  --   --  114*  --   --   --  113*   CO2  --  23  --   --  22  --   --   --  23   BUN  --  71*  --   --  59*  --   --   --  59*   CR  --  4.60*  --   --  4.32*  --   --   --  4.37*   ANIONGAP  --  8  --   --  10  --   --   --  9   TEMO  --  7.3*  --   --  7.5*  --   --   --  8.1*   GLC  --  90  --   --  95  --   --   --  145*   TROPI  --   --   --   --   --  0.092* 0.062* 0.071* 0.066*   < > = values in this interval not displayed.    Imaging:   No results  found for this or any previous visit (from the past 24 hour(s)).

## 2018-05-31 NOTE — PLAN OF CARE
Problem: Patient Care Overview  Goal: Plan of Care/Patient Progress Review  Outcome: No Change  Has denied pain or shortness of breath this past shift. Remains pleasantly confuse. Incontinent urine. Fed himself all of his breakfast and some lunch. Has remained in bed, repositioned, gets back to his back on his own. Dc with hospice care when arrangements made

## 2018-06-01 VITALS
DIASTOLIC BLOOD PRESSURE: 94 MMHG | HEART RATE: 82 BPM | OXYGEN SATURATION: 97 % | RESPIRATION RATE: 22 BRPM | WEIGHT: 144.62 LBS | SYSTOLIC BLOOD PRESSURE: 132 MMHG | TEMPERATURE: 100.3 F | BODY MASS INDEX: 22.65 KG/M2

## 2018-06-01 PROCEDURE — 25000125 ZZHC RX 250: Performed by: HOSPITALIST

## 2018-06-01 PROCEDURE — 40000275 ZZH STATISTIC RCP TIME EA 10 MIN

## 2018-06-01 PROCEDURE — 94640 AIRWAY INHALATION TREATMENT: CPT | Mod: 76

## 2018-06-01 PROCEDURE — 99239 HOSP IP/OBS DSCHRG MGMT >30: CPT | Performed by: HOSPITALIST

## 2018-06-01 PROCEDURE — 94640 AIRWAY INHALATION TREATMENT: CPT

## 2018-06-01 PROCEDURE — 25000132 ZZH RX MED GY IP 250 OP 250 PS 637: Mod: GY | Performed by: INTERNAL MEDICINE

## 2018-06-01 PROCEDURE — A9270 NON-COVERED ITEM OR SERVICE: HCPCS | Mod: GY | Performed by: INTERNAL MEDICINE

## 2018-06-01 RX ORDER — ACETAMINOPHEN 650 MG/1
650 SUPPOSITORY RECTAL EVERY 4 HOURS PRN
Qty: 60 SUPPOSITORY | Refills: 0 | Status: SHIPPED | OUTPATIENT
Start: 2018-06-01

## 2018-06-01 RX ORDER — ONDANSETRON 4 MG/1
4 TABLET, ORALLY DISINTEGRATING ORAL EVERY 6 HOURS PRN
Qty: 120 TABLET | Refills: 0 | Status: SHIPPED | OUTPATIENT
Start: 2018-06-01

## 2018-06-01 RX ADMIN — ALBUTEROL SULFATE 2.5 MG: 2.5 SOLUTION RESPIRATORY (INHALATION) at 07:46

## 2018-06-01 RX ADMIN — ALBUTEROL SULFATE 2.5 MG: 2.5 SOLUTION RESPIRATORY (INHALATION) at 00:01

## 2018-06-01 RX ADMIN — FUROSEMIDE 40 MG: 40 TABLET ORAL at 08:35

## 2018-06-01 NOTE — PLAN OF CARE
Problem: Patient Care Overview  Goal: Discharge Needs Assessment  Outcome: No Change   No vitals, has been anxious and restless at times tonight, ativan given x2. O2 only for comfort, S/O at bedside, siderails up x3, alarm on, pt is impulsive! Needs hospice bed/placement.

## 2018-06-01 NOTE — PROGRESS NOTES
SW Note    I: Patient has been accepted at Counts include [facility] beds [facility]. [facility] and a shared at [facility].  Patient said it was okay for SO to sign paperwork for him for admission/hospice services.  SO is going to tour facilities today before 1400 appointment with hospice services and will have decision made on placement for today.  SO stated that she understood patient needed to discharge today to one of the given facilities.  SW discussed private pay fee's with patient and SO at facility for room and board.      P: SW will continue to follow for discharge.     IDALIA Ramirez, [facility]     Addendum (2001): SO not present at 1400 for hospice meeting.  SW placed call to SO regarding appointment and left VM.

## 2018-06-01 NOTE — DISCHARGE INSTRUCTIONS
Luis  & Hospice providers: Patient's significant other will be continuing to tour facilities in the hopes of transitioning to a different facility.  Please be supportive of this and help her navigate the decision-making given different facilities in the area.

## 2018-06-01 NOTE — PLAN OF CARE
Problem: Patient Care Overview  Goal: Plan of Care/Patient Progress Review  Outcome: No Change  Comfort Cares. Oriented to self. Confused. No vitals, pt has been resting this shift. Labored breathing, 4L NC, IV Dilaudid given x1 for dyspnea, Neb given with temporary  relief. Denies pain. 3 side rails up, impulsive at times. Incontinent of B/B. Turn and Reposition q2 hrs. No IV access.  Significant other in room and very supportive. Plan for awaiting LTC hospice placement.

## 2018-06-01 NOTE — DISCHARGE SUMMARY
[facility]    Discharge Summary  Hospitalist    Date of Admission:  5/27/2018  Date of Discharge:  6/1/2018  Discharging Provider: Coral Mitchell MD  Date of Service (when I saw the patient): 06/01/18    Discharge Diagnoses      Patient was diagnosed with the following conditions which were managed, eventually he was transitioned to comfort care, he will be discharged to TCU to continue comfort care.      Acute hypoxic respiratory failure    Suspected HFrEF exacerbation     Possible HCAP    Pulmonary HTN     SARAHI on CKD stage IV    Acute blood loss anemia  on chronic anemia    Essential HTN    Hypothyroidism    Seizure disorder    BPH.    History of Present Illness   Yuriy Ghosh is a 89 year old male with a past medical history of HTN, PAD, hypothyroid, seizure disorder, BPH, CKD, CHF, recent hospitalization complicated by GI bleed due to duodenal ulcer who presented with shortness of breath.  He was admitted 5/27/2018 for management of above.    Hospital Course   Yuriy Ghosh was admitted on 5/27/2018.  The following problems were addressed during his hospitalization:      Acute hypoxic respiratory failure  Suspected HFrEF exacerbation   Possible HCAP  Pulmonary HTN   Comfort care only  Continue lasix 40mg PO daily for comfort to prevent congestion and dyspnea.     SARAHI on CKD stage IV  Acute blood loss anemia  on chronic anemia  HTN  Hypothyroid    Seizure disorder  - Continue PTA Keppra  - PRN lorazepam SL when unable to take PO      Dementia  BPH  # Discharge Pain Plan:    - Patient currently has NO PAIN currently but he is on comfort care, and per care plan, concentrated dilaudid ordered for pain/dyspnea for hospice care along with bowel regimen.    Coral Mitchell MD  Hospitalist    Significant Results and Procedures   CXR report attached.    Pending Results   These results will be followed up by none.    Unresulted Labs Ordered in the Past 30 Days of this Admission     Date and  Time Order Name Status Description    5/27/2018 1452 Blood culture Preliminary     5/27/2018 1452 Blood culture Preliminary           Code Status   Comfort measures only       Primary Care Physician   Kal Guadalupe    Constitutional: Pleasantly demented.  Awake and alert  Respiratory: Coarse with crackles bilaterally, no respiratory distress.   Cardiovascular: Regular rate and rhythm, normal S1 and S2, and 2+ murmur noted  GI: Normal bowel sounds, soft, non-distended, non-tender  Skin/Integumen: No rashes, no cyanosis  MSK:  Bilateral lower extremity edema     Discharge Disposition   Discharged to nursing home  Condition at discharge: Poor    Consultations This Hospital Stay   PHARMACY TO DOSE VANCO  OCCUPATIONAL THERAPY ADULT IP CONSULT  PHYSICAL THERAPY ADULT IP CONSULT  NEPHROLOGY IP CONSULT  GASTROENTEROLOGY IP CONSULT  ETHICS IP CONSULT  PHARMACY TO DOSE HEPARIN  PALLIATIVE CARE ADULT IP CONSULT  SOCIAL WORK IP CONSULT  SOCIAL WORK IP CONSULT    Time Spent on this Encounter   ICoral, personally saw the patient today and spent greater than 30 minutes discharging this patient.    Discharge Orders     Reason for your hospital stay   Acute respiratory failure due to Acute CHF/HCAP, and acute anemia, suspected GI bleed.     Follow-up and recommended labs and tests    Hospice.     Activity   Your activity upon discharge: activity as tolerated     DNR/DNI     Diet   Follow this diet upon discharge: Orders Placed This Encounter     Room Service     Regular Diet Adult       Discharge Medications   Current Discharge Medication List      START taking these medications    Details   !! acetaminophen (TYLENOL) 650 MG Suppository Place 1 suppository (650 mg) rectally every 4 hours as needed for fever or mild pain  Qty: 60 suppository, Refills: 0    Associated Diagnoses: End of life care      !! acetaminophen (TYLENOL) 650 MG Suppository Place 1 suppository (650 mg) rectally every 4 hours as needed for mild  pain  Qty: 60 suppository, Refills: 0    Associated Diagnoses: End of life care      atropine 1 % ophthalmic solution Place 1-2 drops under the tongue every hour as needed for other (secretions)  Qty: 1 Bottle, Refills: 0    Associated Diagnoses: End of life care      bisacodyl (DULCOLAX) 10 MG Suppository Place 1 suppository (10 mg) rectally daily as needed for constipation  Qty: 30 suppository, Refills: 0    Associated Diagnoses: End of life care      furosemide (LASIX) 40 MG tablet Take 1 tablet (40 mg) by mouth daily  Qty: 30 tablet, Refills: 0    Associated Diagnoses: End of life care      haloperidol (HALDOL) 2 MG/ML (HIGH CONC) solution Take 1 mL (2 mg) by mouth every 6 hours as needed for agitation or other (hallucinations, restlessness, delirium, nausea)  Qty: 60 mL, Refills: 0    Associated Diagnoses: End of life care      HYDROmorphone (DILAUDID) 4 mg/0.4 mL (HIGH CONC) oral solution Place 0.2-0.4 mLs (2-4 mg) under the tongue every 2 hours as needed for moderate to severe pain or other (SOB, dyspnea, distress)  Qty: 30 mL, Refills: 0    Associated Diagnoses: End of life care      LORazepam (LORAZEPAM INTENSOL) 2 MG/ML (HIGH CONC) solution Take 0.5 mLs (1 mg) by mouth every 3 hours as needed  Qty: 15 mL, Refills: 0    Associated Diagnoses: End of life care      ondansetron (ZOFRAN-ODT) 4 MG ODT tab Take 1 tablet (4 mg) by mouth every 6 hours as needed for nausea or vomiting  Qty: 120 tablet, Refills: 0    Associated Diagnoses: End of life care      senna-docusate (SENOKOT-S;PERICOLACE) 8.6-50 MG per tablet Take 1 tablet by mouth 2 times daily as needed for constipation  Qty: 100 tablet, Refills: 0    Associated Diagnoses: End of life care       !! - Potential duplicate medications found. Please discuss with provider.      CONTINUE these medications which have NOT CHANGED    Details   LevETIRAcetam (KEPPRA PO) Take 250 mg by mouth At Bedtime         STOP taking these medications       acetaminophen  (TYLENOL) 325 MG tablet Comments:   Reason for Stopping:         amLODIPine (NORVASC) 10 MG tablet Comments:   Reason for Stopping:         aspirin 81 MG chewable tablet Comments:   Reason for Stopping:         calcium citrate-vitamin D (CITRACAL) 315-250 MG-UNIT TABS per tablet Comments:   Reason for Stopping:         CYANOCOBALAMIN PO Comments:   Reason for Stopping:         DONEPEZIL HCL PO Comments:   Reason for Stopping:         finasteride (PROSCAR) 5 MG tablet Comments:   Reason for Stopping:         FOLIC ACID PO Comments:   Reason for Stopping:         hydrALAZINE (APRESOLINE) 50 MG tablet Comments:   Reason for Stopping:         LEVOTHYROXINE SODIUM PO Comments:   Reason for Stopping:         MEMANTINE HCL PO Comments:   Reason for Stopping:         pantoprazole (PROTONIX) 40 MG EC tablet Comments:   Reason for Stopping:         VITAMIN D, CHOLECALCIFEROL, PO Comments:   Reason for Stopping:             Allergies   No Known Allergies  Data   Most Recent 3 CBC's:  Recent Labs   Lab Test  05/29/18   1300  05/29/18   0510  05/29/18   0025   05/28/18   0738   05/27/18   1458   05/13/18   0709   WBC   --    --    --    --   12.3*   --   15.3*   --   12.0*   HGB  9.4*  7.0*  7.8*   < >  6.9*   < >  6.3*   < >  6.9*   MCV   --    --    --    --   88   --   90   --   87   PLT   --    --    --    --   195   --   268   --   116*    < > = values in this interval not displayed.      Most Recent 3 BMP's:  Recent Labs   Lab Test  05/29/18   0510  05/28/18   0738  05/27/18   1458   NA  149*  146*  145*   POTASSIUM  4.3  4.4  4.6   CHLORIDE  118*  114*  113*   CO2  23  22  23   BUN  71*  59*  59*   CR  4.60*  4.32*  4.37*   ANIONGAP  8  10  9   TEMO  7.3*  7.5*  8.1*   GLC  90  95  145*     Most Recent 2 LFT's:  Recent Labs   Lab Test  05/01/18   1715   AST  14   ALT  11   ALKPHOS  74   BILITOTAL  0.3     Most Recent INR's and Anticoagulation Dosing History:  Anticoagulation Dose History     Recent Dosing and Labs Latest  Ref Rng & Units 5/1/2018 5/12/2018 5/27/2018    INR 0.86 - 1.14 1.15(H) 1.26(H) 1.15(H)        Most Recent 3 Troponin's:  Recent Labs   Lab Test  05/28/18   0210  05/27/18   2150  05/27/18   1810   TROPI  0.092*  0.062*  0.071*     Most Recent Cholesterol Panel:No lab results found.  Most Recent 6 Bacteria Isolates From Any Culture (See EPIC Reports for Culture Details):  Recent Labs   Lab Test  05/27/18   1608  05/27/18   1458  05/13/18   1625   CULT  No growth after 5 days  No growth after 5 days  10,000 to 50,000 colonies/mL  mixed urogenital michael  Susceptibility testing not routinely done       Most Recent TSH, T4 and A1c Labs:No lab results found.  Results for orders placed or performed during the hospital encounter of 05/27/18   Chest XR,  PA & LAT    Narrative    CHEST TWO VIEWS  5/27/2018 3:43 PM     HISTORY: Check for pneumonia, short of breath, cough and hypoxia.     COMPARISON: Images from prior exam 6/16/1998 are no longer available.  Report from prior study describes healing posterior left-sided rib  fracture.      Impression    IMPRESSION: Left perihilar and right perihilar and peripheral patchy  infiltrates. Bilateral pleural effusions, seen best on the lateral  view. Heart size difficult to evaluate due to rotation of the patient.  Vascular calcifications are seen.       LUZ MURILLO MD

## 2018-06-01 NOTE — PROGRESS NOTES
" Hospice: Significant other Eva did not show up for hospice meeting until 2.45p. I had another hospice consult scheduled for 3p.so was not able to meet with her. Eva appears very stressed about where to send Sam. I am not sure she understands the hospice piece as she has been focusing on finding a \"good place\" for him to be discharged to. We had tentatively set up a 3.30p time for hospice to meet with her at [facility] but have not been able to connect with her to confirm this. The hospice office staff will try and reach out to Eva to reschedule another time. Thank you. Maribel Santos RN, BSN   Hospice Admission nurse  [phone]  "

## 2018-06-01 NOTE — PROGRESS NOTES
SW:    D:  Received discharge orders for patient.  Bed is available at [facility] for today.  SO is asking for transportation to be arranged. Explained that we can not guarantee that transportation services will be covered by insurance and SO is in agreement.  Call placed to [facility] to arrange for stretcher transport at 1630 today.  Called and informed SO that patient would be discharging at 1630 today.  SO in agreement with discharge plan.  Updated facility and faxed orders and the PAS.    PAS-RR    D: Per DHS regulation, SW completed and submitted PAS-RR to [place] [employer] Direct Connect via the Senior LinkAge Line.  PAS-RR confirmation # is : [id number]    I: SW spoke with Patient and Sig. Other and they are aware a PAS-RR has been submitted.  SW reviewed with Patient and Sig Other that they may be contacted for a follow up appointment within 10 days of hospital discharge if their SNF stay is < 30 days.  Contact information for Senior LinkAge Line was also provided.    A: Patient and Sig Other verbalized understanding.    P: Further questions may be directed to [facility] LinkAge Line at #[phone], option #4 for PAS-RR staff.  Discharge Planner   Discharge Plans in progress: [facility].  Barriers to discharge plan: None  Follow up plan: SW will assist with discharge plan.        Entered by: Victor Hugo Miner 06/01/2018 3:54 PM

## 2018-06-01 NOTE — DISCHARGE SUMMARY
Patient discharged at 4:56 PM to [facility] with  hospice. IV was discontinued. Pain at time of discharge was 0/10. Belongings returned to patient.  Discharge instructions and medications reviewed with patient.  Patient verbalized understanding and all questions were answered.  Prescriptions given to patient.  At time of discharge, patient condition was stable and left the unit on stretcher escorted by [facility].

## 2018-06-02 LAB
BACTERIA SPEC CULT: NO GROWTH
BACTERIA SPEC CULT: NO GROWTH
Lab: NORMAL
Lab: NORMAL
SPECIMEN SOURCE: NORMAL
SPECIMEN SOURCE: NORMAL

## 2022-06-23 NOTE — PROVIDER NOTIFICATION
MD Notification    Notified Person: MD urology    Notified Person Name: Dr. Pizarro    Notification Date/Time: 05/12/18, 8:59 AM    Notification Interaction: TWRB    Purpose of Notification: Pt ware clogged, despite multiple attempted to flush, unable to pull back any urine or clots.    Orders Received: Replace catheter with 20-Czech coude 2 way.    Comments:       Results   Fecal Occult Blood, Tube Test   07 Oct 2017 12:01 AM  -   OCCULT BLOOD, TUBE TEST: NEGATIVE  Reference Range: NEGATIVE.  Discussed   von Anderson.  Thank you.  Dr Clements.   Initial (On Arrival)

## 2022-07-17 NOTE — PLAN OF CARE
Problem: Patient Care Overview  Goal: Plan of Care/Patient Progress Review  Outcome: Improving  Oriented to self per baseline. Calm and cooperative. VSS ex bradycardic in 50s. Hgb 6.9 this morning, 1 unit PRBCs given. Most recent Hgb 7.9 (checking Q 6 hrs).  cc/hr. LS clear but dim. 2+ BLE edema. Compression stockings applied. Denies pain. Incontinent urine, also voids per urinal when prompted. No further melena today. Tolerating full liquids. Seen by nephrology today for increase in Cr. Pt stood at EOB with Ax2, walker, no dizziness/lightheadedness. Declined to work with PT. Plan d/c to TCU when medically stable.          Simple: Patient demonstrates quick and easy understanding

## 2024-01-09 NOTE — IP AVS SNAPSHOT
` ` Patient Information     Patient Name Sex     Yuriy Ghosh ([id number]) Male 1926       Room Bed    [facility] 8816-      Patient Demographics     Address Phone    [address] [place] [address] [phone] (Home)  [phone] (Mobile) *Preferred*      Patient Ethnicity & Race     Ethnic Group Patient Race    American White      Emergency Contact(s)     Name Relation Home Work Mobile    Eva Hernandez Significant other [phone]        Documents on File        Status Date Received Description       Documents for the Patient    Face Sheet Received () 09     Affiliate Privacy placeholder   phase3    Consent for EHR Access Received 18     Insurance Card Received 18 MEDICARE    Patient ID Received 18     [facility] Specified Other       Privacy Notice - [place] Received 18     Consent for Services - Hospital and Clinic Received 18     HIE Auth Received 18     Insurance Card Received 18 BCBS       Documents for the Encounter    CMS IM for Patient Signature Received 18     EMS/Ambulance Record  18 [place] [facility] DEPART[facility]    CMS KEENE for Patient Signature Received 18     CMS IM for Patient Signature Received 18 2MM    Discharge Attachment   GASTROINTESTINAL (GI) BLEEDING, WHEN YOU HAVE (ENGLISH)    Discharge Attachment   FALL PREVENTION (ENGLISH)    CMS IM for Patient Signature Received 05/15/18 3MM      Admission Information     Attending Provider Admitting Provider Admission Type Admission Date/Time    Kelvin Humphrey MD Reynolds, Jonathan Avery, MD Emergency 18  1623    Discharge Date Hospital Service Auth/Cert Status Service Area     Hospitalist [facility]    Unit Room/Bed Admission Status       [facility] ONCOLOGY 1688- Admission (Confirmed)       Admission     Complaint    Fall, Acute renal failure (ARF) (H), melena      Hospital Account     Name Acct ID Class Status Primary  Coverage    Yuriy Ghosh [id number] Inpatient Open MEDICARE - MEDICARE FOR HB SUPPLEMENT            Guarantor Account (for Hospital Account #[id number])     Name Relation to Pt Service Area Active? Acct Type    Yuriy Ghosh Self FCS Yes Personal/Family    Address Phone          [address] [address] [phone](H)              Coverage Information (for Hospital Account #[id number])     1. MEDICARE/MEDICARE FOR HB SUPPLEMENT     F/O Payor/Plan Precert #    MEDICARE/MEDICARE FOR HB SUPPLEMENT     Subscriber Subscriber #    Yuriy Ghosh [id number]    Address Phone    [employer]  [address]  [place] [address] [phone]          2. BCBS/BCBS PLATINUM BLUE     F/O Payor/Plan Precert #    BCBS/BCBS PLATINUM BLUE     Subscriber Subscriber #    Yuriy Ghosh [id number]    Address Phone    [address] [address]  [place] [address] [phone]             n/a
